# Patient Record
Sex: MALE | Race: ASIAN | NOT HISPANIC OR LATINO | Employment: FULL TIME | URBAN - METROPOLITAN AREA
[De-identification: names, ages, dates, MRNs, and addresses within clinical notes are randomized per-mention and may not be internally consistent; named-entity substitution may affect disease eponyms.]

---

## 2017-10-14 ENCOUNTER — HOSPITAL ENCOUNTER (OUTPATIENT)
Dept: LAB | Facility: MEDICAL CENTER | Age: 22
End: 2017-10-14
Attending: FAMILY MEDICINE
Payer: COMMERCIAL

## 2017-10-14 LAB
BASOPHILS # BLD AUTO: 0.7 % (ref 0–1.8)
BASOPHILS # BLD: 0.04 K/UL (ref 0–0.12)
EOSINOPHIL # BLD AUTO: 0.12 K/UL (ref 0–0.51)
EOSINOPHIL NFR BLD: 2.1 % (ref 0–6.9)
ERYTHROCYTE [DISTWIDTH] IN BLOOD BY AUTOMATED COUNT: 38.2 FL (ref 35.9–50)
HCT VFR BLD AUTO: 49 % (ref 42–52)
HGB BLD-MCNC: 16.9 G/DL (ref 14–18)
IMM GRANULOCYTES # BLD AUTO: 0.03 K/UL (ref 0–0.11)
IMM GRANULOCYTES NFR BLD AUTO: 0.5 % (ref 0–0.9)
LYMPHOCYTES # BLD AUTO: 1.43 K/UL (ref 1–4.8)
LYMPHOCYTES NFR BLD: 25.3 % (ref 22–41)
MCH RBC QN AUTO: 29.8 PG (ref 27–33)
MCHC RBC AUTO-ENTMCNC: 34.5 G/DL (ref 33.7–35.3)
MCV RBC AUTO: 86.3 FL (ref 81.4–97.8)
MONOCYTES # BLD AUTO: 0.41 K/UL (ref 0–0.85)
MONOCYTES NFR BLD AUTO: 7.3 % (ref 0–13.4)
NEUTROPHILS # BLD AUTO: 3.62 K/UL (ref 1.82–7.42)
NEUTROPHILS NFR BLD: 64.1 % (ref 44–72)
NRBC # BLD AUTO: 0 K/UL
NRBC BLD AUTO-RTO: 0 /100 WBC
PLATELET # BLD AUTO: 232 K/UL (ref 164–446)
PMV BLD AUTO: 10 FL (ref 9–12.9)
RBC # BLD AUTO: 5.68 M/UL (ref 4.7–6.1)
WBC # BLD AUTO: 5.7 K/UL (ref 4.8–10.8)

## 2017-10-14 PROCEDURE — 85025 COMPLETE CBC W/AUTO DIFF WBC: CPT

## 2017-10-14 PROCEDURE — 36415 COLL VENOUS BLD VENIPUNCTURE: CPT

## 2017-12-17 ENCOUNTER — HOSPITAL ENCOUNTER (EMERGENCY)
Facility: MEDICAL CENTER | Age: 22
End: 2017-12-17
Attending: EMERGENCY MEDICINE
Payer: COMMERCIAL

## 2017-12-17 ENCOUNTER — APPOINTMENT (OUTPATIENT)
Dept: RADIOLOGY | Facility: MEDICAL CENTER | Age: 22
End: 2017-12-17
Attending: EMERGENCY MEDICINE
Payer: COMMERCIAL

## 2017-12-17 VITALS
RESPIRATION RATE: 16 BRPM | TEMPERATURE: 98.1 F | BODY MASS INDEX: 27.4 KG/M2 | HEIGHT: 64 IN | HEART RATE: 88 BPM | DIASTOLIC BLOOD PRESSURE: 56 MMHG | WEIGHT: 160.5 LBS | SYSTOLIC BLOOD PRESSURE: 133 MMHG | OXYGEN SATURATION: 100 %

## 2017-12-17 DIAGNOSIS — S61.412A LACERATION OF LEFT HAND WITHOUT FOREIGN BODY, INITIAL ENCOUNTER: ICD-10-CM

## 2017-12-17 PROCEDURE — 90471 IMMUNIZATION ADMIN: CPT

## 2017-12-17 PROCEDURE — 303485 HCHG DRESSING MEDIUM

## 2017-12-17 PROCEDURE — 73130 X-RAY EXAM OF HAND: CPT | Mod: LT

## 2017-12-17 PROCEDURE — 700111 HCHG RX REV CODE 636 W/ 250 OVERRIDE (IP): Performed by: EMERGENCY MEDICINE

## 2017-12-17 PROCEDURE — 99284 EMERGENCY DEPT VISIT MOD MDM: CPT

## 2017-12-17 PROCEDURE — 90715 TDAP VACCINE 7 YRS/> IM: CPT | Performed by: EMERGENCY MEDICINE

## 2017-12-17 RX ORDER — CEPHALEXIN 500 MG/1
500 CAPSULE ORAL 4 TIMES DAILY
Qty: 28 CAP | Refills: 0 | Status: SHIPPED | OUTPATIENT
Start: 2017-12-17 | End: 2017-12-24

## 2017-12-17 RX ADMIN — CLOSTRIDIUM TETANI TOXOID ANTIGEN (FORMALDEHYDE INACTIVATED), CORYNEBACTERIUM DIPHTHERIAE TOXOID ANTIGEN (FORMALDEHYDE INACTIVATED), BORDETELLA PERTUSSIS TOXOID ANTIGEN (GLUTARALDEHYDE INACTIVATED), BORDETELLA PERTUSSIS FILAMENTOUS HEMAGGLUTININ ANTIGEN (FORMALDEHYDE INACTIVATED), BORDETELLA PERTUSSIS PERTACTIN ANTIGEN, AND BORDETELLA PERTUSSIS FIMBRIAE 2/3 ANTIGEN 0.5 ML: 5; 2; 2.5; 5; 3; 5 INJECTION, SUSPENSION INTRAMUSCULAR at 08:11

## 2017-12-17 ASSESSMENT — PAIN SCALES - GENERAL
PAINLEVEL_OUTOF10: 5
PAINLEVEL_OUTOF10: 5
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
## 2017-12-17 NOTE — DISCHARGE INSTRUCTIONS
Laceration Care, Adult  A laceration is a cut that goes through all layers of the skin. The cut also goes into the tissue that is right under the skin. Some cuts heal on their own. Others need to be closed with stitches (sutures), staples, skin adhesive strips, or wound glue. Taking care of your cut lowers your risk of infection and helps your cut to heal better.  HOW TO TAKE CARE OF YOUR CUT  For stitches or staples:  · Keep the wound clean and dry.  · If you were given a bandage (dressing), you should change it at least one time per day or as told by your doctor. You should also change it if it gets wet or dirty.  · Keep the wound completely dry for the first 24 hours or as told by your doctor. After that time, you may take a shower or a bath. However, make sure that the wound is not soaked in water until after the stitches or staples have been removed.  · Clean the wound one time each day or as told by your doctor:  ¨ Wash the wound with soap and water.  ¨ Rinse the wound with water until all of the soap comes off.  ¨ Pat the wound dry with a clean towel. Do not rub the wound.  · After you clean the wound, put a thin layer of antibiotic ointment on it as told by your doctor. This ointment:  ¨ Helps to prevent infection.  ¨ Keeps the bandage from sticking to the wound.  · Have your stitches or staples removed as told by your doctor.  If your doctor used skin adhesive strips:   · Keep the wound clean and dry.  · If you were given a bandage, you should change it at least one time per day or as told by your doctor. You should also change it if it gets dirty or wet.  · Do not get the skin adhesive strips wet. You can take a shower or a bath, but be careful to keep the wound dry.  · If the wound gets wet, pat it dry with a clean towel. Do not rub the wound.  · Skin adhesive strips fall off on their own. You can trim the strips as the wound heals. Do not remove any strips that are still stuck to the wound. They will  fall off after a while.  If your doctor used wound glue:  · Try to keep your wound dry, but you may briefly wet it in the shower or bath. Do not soak the wound in water, such as by swimming.  · After you take a shower or a bath, gently pat the wound dry with a clean towel. Do not rub the wound.  · Do not do any activities that will make you really sweaty until the skin glue has fallen off on its own.  · Do not apply liquid, cream, or ointment medicine to your wound while the skin glue is still on.  · If you were given a bandage, you should change it at least one time per day or as told by your doctor. You should also change it if it gets dirty or wet.  · If a bandage is placed over the wound, do not let the tape for the bandage touch the skin glue.  · Do not pick at the glue. The skin glue usually stays on for 5-10 days. Then, it falls off of the skin.  General Instructions   · To help prevent scarring, make sure to cover your wound with sunscreen whenever you are outside after stitches are removed, after adhesive strips are removed, or when wound glue stays in place and the wound is healed. Make sure to wear a sunscreen of at least 30 SPF.  · Take over-the-counter and prescription medicines only as told by your doctor.  · If you were given antibiotic medicine or ointment, take or apply it as told by your doctor. Do not stop using the antibiotic even if your wound is getting better.  · Do not scratch or pick at the wound.  · Keep all follow-up visits as told by your doctor. This is important.  · Check your wound every day for signs of infection. Watch for:  ¨ Redness, swelling, or pain.  ¨ Fluid, blood, or pus.  · Raise (elevate) the injured area above the level of your heart while you are sitting or lying down, if possible.  GET HELP IF:  · You got a tetanus shot and you have any of these problems at the injection site:  ¨ Swelling.  ¨ Very bad pain.  ¨ Redness.  ¨ Bleeding.  · You have a fever.  · A wound that was  closed breaks open.  · You notice a bad smell coming from your wound or your bandage.  · You notice something coming out of the wound, such as wood or glass.  · Medicine does not help your pain.  · You have more redness, swelling, or pain at the site of your wound.  · You have fluid, blood, or pus coming from your wound.  · You notice a change in the color of your skin near your wound.  · You need to change the bandage often because fluid, blood, or pus is coming from the wound.  · You start to have a new rash.  · You start to have numbness around the wound.  GET HELP RIGHT AWAY IF:  · You have very bad swelling around the wound.  · Your pain suddenly gets worse and is very bad.  · You notice painful lumps near the wound or on skin that is anywhere on your body.  · You have a red streak going away from your wound.  · The wound is on your hand or foot and you cannot move a finger or toe like you usually can.  · The wound is on your hand or foot and you notice that your fingers or toes look pale or bluish.     This information is not intended to replace advice given to you by your health care provider. Make sure you discuss any questions you have with your health care provider.     Document Released: 06/05/2009 Document Revised: 05/03/2016 Document Reviewed: 12/14/2015  [employer] Interactive Patient Education ©2016 [employer]    Nonsutured Laceration Care  A laceration is a cut that goes through all layers of the skin and extends into the tissue that is right under the skin. This type of cut is usually stitched up (sutured) or closed with tape (adhesive strips) or skin glue shortly after the injury happens.  However, if the wound is dirty or if several hours pass before medical treatment is provided, it is likely that germs (bacteria) will enter the wound. Closing a laceration after bacteria have entered it increases the risk of infection. In these cases, your health care provider may leave the laceration open  (nonsutured) and cover it with a bandage. This type of treatment helps prevent infection and allows the wound to heal from the deepest layer of tissue damage up to the surface.  An open fracture is a type of injury that may involve nonsutured lacerations. An open fracture is a break in a bone that happens along with one or more lacerations through the skin that is near the fracture site.  HOW TO CARE FOR YOUR NONSUTURED LACERATION  · Take or apply over-the-counter and prescription medicines only as told by your health care provider.  · If you were prescribed an antibiotic medicine, take or apply it as told by your health care provider. Do not stop using the antibiotic even if your condition improves.  · Clean the wound one time each day or as told by your health care provider.  ¨ Wash the wound with mild soap and water.  ¨ Rinse the wound with water to remove all soap.  ¨ Pat your wound dry with a clean towel. Do not rub the wound.  · Do not inject anything into the wound unless your health care provider told you to.  · Change any bandages (dressings) as told by your health care provider. This includes changing the dressing if it gets wet, dirty, or starts to smell bad.  · Keep the dressing dry until your health care provider says it can be removed. Do not take baths, swim, or do anything that puts your wound underwater until your health care provider approves.  · Raise (elevate) the injured area above the level of your heart while you are sitting or lying down, if possible.  · Do not scratch or pick at the wound.  · Check your wound every day for signs of infection. Watch for:  ¨ Redness, swelling, or pain.  ¨ Fluid, blood, or pus.  · Keep all follow-up visits as told by your health care provider. This is important.  SEEK MEDICAL CARE IF:  · You received a tetanus and shot and you have swelling, severe pain, redness, or bleeding at the injection site.    · You have a fever.  · Your pain is not controlled with  medicine.  · You have increased redness, swelling, or pain at the site of your wound.  · You have fluid, blood, or pus coming from your wound.  · You notice a bad smell coming from your wound or your dressing.  · You notice something coming out of the wound, such as wood or glass.  · You notice a change in the color of your skin near your wound.  · You develop a new rash.  · You need to change the dressing frequently due to fluid, blood, or pus draining from the wound.  · You develop numbness around your wound.  SEEK IMMEDIATE MEDICAL CARE IF:  · Your pain suddenly increases and is severe.  · You develop severe swelling around the wound.  · The wound is on your hand or foot and you cannot properly move a finger or toe.  · The wound is on your hand or foot and you notice that your fingers or toes look pale or bluish.  · You have a red streak going away from your wound.     This information is not intended to replace advice given to you by your health care provider. Make sure you discuss any questions you have with your health care provider.     Document Released: 11/15/2007 Document Revised: 05/03/2016 Document Reviewed: 12/14/2015  [employer] Interactive Patient Education ©2016 [employer]

## 2017-12-17 NOTE — ED NOTES
Hand dressing complete.  Discussed  wound care.  RX x 1 in hand.  Questions answered. Released amb to self, out w/ parents.

## 2017-12-30 ENCOUNTER — OFFICE VISIT (OUTPATIENT)
Dept: URGENT CARE | Facility: PHYSICIAN GROUP | Age: 22
End: 2017-12-30
Payer: COMMERCIAL

## 2017-12-30 VITALS
DIASTOLIC BLOOD PRESSURE: 70 MMHG | TEMPERATURE: 98.4 F | HEIGHT: 64 IN | HEART RATE: 91 BPM | SYSTOLIC BLOOD PRESSURE: 120 MMHG | BODY MASS INDEX: 26.63 KG/M2 | RESPIRATION RATE: 14 BRPM | OXYGEN SATURATION: 95 % | WEIGHT: 156 LBS

## 2017-12-30 DIAGNOSIS — J22 LOWER RESPIRATORY INFECTION: ICD-10-CM

## 2017-12-30 PROCEDURE — 99203 OFFICE O/P NEW LOW 30 MIN: CPT | Performed by: PHYSICIAN ASSISTANT

## 2017-12-30 RX ORDER — BENZONATATE 100 MG/1
100 CAPSULE ORAL 3 TIMES DAILY PRN
Qty: 60 CAP | Refills: 0 | Status: SHIPPED | OUTPATIENT
Start: 2017-12-30 | End: 2019-03-11

## 2017-12-30 RX ORDER — AZITHROMYCIN 250 MG/1
TABLET, FILM COATED ORAL
Qty: 6 TAB | Refills: 0 | Status: SHIPPED | OUTPATIENT
Start: 2017-12-30 | End: 2019-03-11

## 2017-12-30 ASSESSMENT — ENCOUNTER SYMPTOMS
SPUTUM PRODUCTION: 1
ABDOMINAL PAIN: 0
VOMITING: 0
DIZZINESS: 0
FEVER: 0
CHILLS: 0
SHORTNESS OF BREATH: 0
MYALGIAS: 0
NAUSEA: 0
SORE THROAT: 0
COUGH: 1
DIARRHEA: 0

## 2017-12-30 ASSESSMENT — COPD QUESTIONNAIRES: COPD: 0

## 2017-12-30 NOTE — PROGRESS NOTES
"Subjective:      Gildardo Moody is a 22 y.o. male who presents with Cough (deep in chest sinus x3 weeks )            Cough   This is a new problem. The current episode started 1 to 4 weeks ago (3 weeks). The problem has been unchanged. The problem occurs every few minutes. The cough is non-productive. Pertinent negatives include no chest pain, chills, ear pain, fever, myalgias, rash, sore throat or shortness of breath. The symptoms are aggravated by lying down. He has tried OTC cough suppressant for the symptoms. The treatment provided mild relief. There is no history of asthma, COPD or pneumonia.     Patient reports he was recently given a prescription for Keflex 2 weeks ago after a laceration to his palm. His symptoms got better while he was taking the antibiotics, but then worsened again after he stopped taking them again. His sister was recently sick with similar symptoms.     Review of Systems   Constitutional: Negative for chills and fever.   HENT: Negative for congestion, ear pain and sore throat.    Respiratory: Positive for cough and sputum production. Negative for shortness of breath.    Cardiovascular: Negative for chest pain.   Gastrointestinal: Negative for abdominal pain, diarrhea, nausea and vomiting.   Genitourinary: Negative.    Musculoskeletal: Negative for myalgias.   Skin: Negative for rash.   Neurological: Negative for dizziness.        Objective:     /70   Pulse 91   Temp 36.9 °C (98.4 °F)   Resp 14   Ht 1.626 m (5' 4\")   Wt 70.8 kg (156 lb)   SpO2 95%   BMI 26.78 kg/m²      Physical Exam   Constitutional: He is oriented to person, place, and time. He appears well-developed and well-nourished. No distress.   HENT:   Head: Normocephalic and atraumatic.   Right Ear: Hearing, tympanic membrane, external ear and ear canal normal.   Left Ear: Hearing, tympanic membrane, external ear and ear canal normal.   Mouth/Throat: No oropharyngeal exudate, posterior oropharyngeal edema " or posterior oropharyngeal erythema.   Eyes: Conjunctivae are normal. Pupils are equal, round, and reactive to light.   Neck: Normal range of motion.   Cardiovascular: Normal rate, regular rhythm and normal heart sounds.    No murmur heard.  Pulmonary/Chest: Effort normal. He has wheezes. He has rales.   Musculoskeletal: Normal range of motion.   Lymphadenopathy:     He has no cervical adenopathy.   Neurological: He is alert and oriented to person, place, and time.   Skin: Skin is warm and dry. He is not diaphoretic.   Psychiatric: He has a normal mood and affect. His behavior is normal.   Nursing note and vitals reviewed.         PMH:  has a past medical history of ADHD (attention deficit hyperactivity disorder) and ASTHMA.  MEDS:   Current Outpatient Prescriptions:   •  azithromycin (ZITHROMAX) 250 MG Tab, Take 2 tablets by mouth on day one, then 1 tablet by mouth on days two through five, Disp: 6 Tab, Rfl: 0  •  benzonatate (TESSALON) 100 MG Cap, Take 1 Cap by mouth 3 times a day as needed for Cough., Disp: 60 Cap, Rfl: 0  •  methylphenidate (CONCERTA) 54 MG CR tablet, Take 54 mg by mouth every morning., Disp: , Rfl:   •  Multiple Vitamins-Minerals (MULTIVITAMIN PO), Take  by mouth every day., Disp: , Rfl:   ALLERGIES: No Known Allergies  SURGHX:   Past Surgical History:   Procedure Laterality Date   • SHOULDER ARTHROSCOPY W/ SLAP / LABRAL REPAIR  3/13/2014    Performed by Fabián Rosenberg M.D. at [facility]   • OTHER  1/2014    left shoulder dislocation put back in place   • OTHER  1994    detached retina OU     SOCHX:  reports that he has never smoked. He has never used smokeless tobacco. He reports that he drinks alcohol.  FH: family history is not on file.       Assessment/Plan:     1. Lower respiratory infection    - azithromycin (ZITHROMAX) 250 MG Tab; Take 2 tablets by mouth on day one, then 1 tablet by mouth on days two through five  Dispense: 6 Tab; Refill: 0   - Complete full course of  antibiotics as prescribed   - benzonatate (TESSALON) 100 MG Cap; Take 1 Cap by mouth 3 times a day as needed for Cough.  Dispense: 60 Cap; Refill: 0    Call or return to office if symptoms persist or worsen, would recommend CXR at that time. The patient demonstrated a good understanding and agreed with the treatment plan.

## 2017-12-30 NOTE — LETTER
December 30, 2017         Patient: Glidardo Moody   YOB: 1995   Date of Visit: 12/30/2017           To Whom it May Concern:    Gildardo Moody was seen in my clinic on 12/30/2017. Please excuse his absence today, 12/30/17.    If you have any questions or concerns, please don't hesitate to call.        Sincerely,           Alana Gilmore P.A.-C.  Electronically Signed

## 2018-10-05 ENCOUNTER — HOSPITAL ENCOUNTER (OUTPATIENT)
Facility: MEDICAL CENTER | Age: 23
End: 2018-10-05
Attending: FAMILY MEDICINE
Payer: COMMERCIAL

## 2018-10-05 ENCOUNTER — OFFICE VISIT (OUTPATIENT)
Dept: URGENT CARE | Facility: PHYSICIAN GROUP | Age: 23
End: 2018-10-05
Payer: COMMERCIAL

## 2018-10-05 VITALS
DIASTOLIC BLOOD PRESSURE: 76 MMHG | SYSTOLIC BLOOD PRESSURE: 112 MMHG | HEART RATE: 97 BPM | OXYGEN SATURATION: 98 % | BODY MASS INDEX: 27.31 KG/M2 | TEMPERATURE: 99.2 F | HEIGHT: 64 IN | WEIGHT: 160 LBS | RESPIRATION RATE: 14 BRPM

## 2018-10-05 DIAGNOSIS — R82.90 ABNORMAL URINALYSIS: ICD-10-CM

## 2018-10-05 DIAGNOSIS — A74.9 CHLAMYDIA: ICD-10-CM

## 2018-10-05 DIAGNOSIS — R30.0 DYSURIA: ICD-10-CM

## 2018-10-05 LAB
APPEARANCE UR: CLEAR
BILIRUB UR STRIP-MCNC: NEGATIVE MG/DL
COLOR UR AUTO: YELLOW
GLUCOSE UR STRIP.AUTO-MCNC: NEGATIVE MG/DL
KETONES UR STRIP.AUTO-MCNC: NEGATIVE MG/DL
LEUKOCYTE ESTERASE UR QL STRIP.AUTO: NORMAL
NITRITE UR QL STRIP.AUTO: NEGATIVE
PH UR STRIP.AUTO: 7 [PH] (ref 5–8)
PROT UR QL STRIP: NEGATIVE MG/DL
RBC UR QL AUTO: NORMAL
SP GR UR STRIP.AUTO: 1.02
UROBILINOGEN UR STRIP-MCNC: 0.2 MG/DL

## 2018-10-05 PROCEDURE — 87491 CHLMYD TRACH DNA AMP PROBE: CPT

## 2018-10-05 PROCEDURE — 81002 URINALYSIS NONAUTO W/O SCOPE: CPT | Performed by: FAMILY MEDICINE

## 2018-10-05 PROCEDURE — 87086 URINE CULTURE/COLONY COUNT: CPT

## 2018-10-05 PROCEDURE — 99214 OFFICE O/P EST MOD 30 MIN: CPT | Performed by: FAMILY MEDICINE

## 2018-10-05 PROCEDURE — 87591 N.GONORRHOEAE DNA AMP PROB: CPT

## 2018-10-05 NOTE — PROGRESS NOTES
"Subjective:      Gildardo Moody is a 23 y.o. male who presents with Dysuria (x 4 days/ painful when urinating )            4 days dysuria. No penile discharge. No rash.  No hematuria.  No past medical history of urinary tract infection.  He has had unprotected sex and is concerned about STI.  No fever.  No OTC or home remedies tried.  Symptoms are moderate severity.  No aggravating or alleviating factors.        Review of Systems   Constitutional: Negative for malaise/fatigue and weight loss.   HENT: Negative for sore throat.    Eyes: Negative for discharge and redness.   Musculoskeletal: Negative for joint pain and myalgias.   Skin: Negative for itching.   Neurological: Negative for sensory change and focal weakness.     .  Medications, Allergies, and current problem list reviewed today in Epic       Objective:     /76 (BP Location: Left arm, Patient Position: Sitting)   Pulse 97   Temp 37.3 °C (99.2 °F) (Temporal)   Resp 14   Ht 1.626 m (5' 4\")   Wt 72.6 kg (160 lb)   SpO2 98%   BMI 27.46 kg/m²      Physical Exam   Constitutional: He is oriented to person, place, and time. He appears well-developed and well-nourished. No distress.   HENT:   Head: Normocephalic and atraumatic.   Mouth/Throat: Oropharynx is clear and moist.   Eyes: Conjunctivae are normal.   Cardiovascular: Normal rate, regular rhythm and normal heart sounds.    Pulmonary/Chest: Effort normal and breath sounds normal.   Genitourinary:   Genitourinary Comments: Deferred per patient request.  He notes no external lesions or rash.   Neurological: He is alert and oriented to person, place, and time.   Skin: Skin is warm and dry. No rash noted.               Assessment/Plan:   UA reviewed  Urine culture negative  Chlamydia positive    1. Dysuria  CHLAMYDIA/GC PCR URINE OR SWAB    POCT Urinalysis   2. Abnormal urinalysis  URINE CULTURE(NEW)   3. Chlamydia  doxycycline (VIBRAMYCIN) 100 MG Tab     Differential diagnosis, natural " history, supportive care, and indications for immediate follow-up discussed at length.

## 2018-10-06 DIAGNOSIS — R82.90 ABNORMAL URINALYSIS: ICD-10-CM

## 2018-10-07 LAB
C TRACH DNA SPEC QL NAA+PROBE: POSITIVE
N GONORRHOEA DNA SPEC QL NAA+PROBE: NEGATIVE
SPECIMEN SOURCE: ABNORMAL

## 2018-10-08 ENCOUNTER — TELEPHONE (OUTPATIENT)
Dept: URGENT CARE | Facility: PHYSICIAN GROUP | Age: 23
End: 2018-10-08

## 2018-10-08 LAB
BACTERIA UR CULT: NORMAL
SIGNIFICANT IND 70042: NORMAL
SITE SITE: NORMAL
SOURCE SOURCE: NORMAL

## 2018-10-08 RX ORDER — DOXYCYCLINE HYCLATE 100 MG
100 TABLET ORAL 2 TIMES DAILY
Qty: 20 TAB | Refills: 0 | Status: SHIPPED | OUTPATIENT
Start: 2018-10-08 | End: 2018-10-15

## 2018-10-09 ASSESSMENT — ENCOUNTER SYMPTOMS
SENSORY CHANGE: 0
SORE THROAT: 0
WEIGHT LOSS: 0
EYE DISCHARGE: 0
MYALGIAS: 0
FOCAL WEAKNESS: 0
EYE REDNESS: 0

## 2018-10-23 ENCOUNTER — HOSPITAL ENCOUNTER (OUTPATIENT)
Dept: LAB | Facility: MEDICAL CENTER | Age: 23
End: 2018-10-23
Attending: FAMILY MEDICINE
Payer: COMMERCIAL

## 2018-10-23 LAB
BASOPHILS # BLD AUTO: 0.6 % (ref 0–1.8)
BASOPHILS # BLD: 0.03 K/UL (ref 0–0.12)
EOSINOPHIL # BLD AUTO: 0.09 K/UL (ref 0–0.51)
EOSINOPHIL NFR BLD: 1.9 % (ref 0–6.9)
ERYTHROCYTE [DISTWIDTH] IN BLOOD BY AUTOMATED COUNT: 41.9 FL (ref 35.9–50)
HCT VFR BLD AUTO: 48.6 % (ref 42–52)
HGB BLD-MCNC: 16.7 G/DL (ref 14–18)
IMM GRANULOCYTES # BLD AUTO: 0.03 K/UL (ref 0–0.11)
IMM GRANULOCYTES NFR BLD AUTO: 0.6 % (ref 0–0.9)
LYMPHOCYTES # BLD AUTO: 1.17 K/UL (ref 1–4.8)
LYMPHOCYTES NFR BLD: 24.1 % (ref 22–41)
MCH RBC QN AUTO: 30.4 PG (ref 27–33)
MCHC RBC AUTO-ENTMCNC: 34.4 G/DL (ref 33.7–35.3)
MCV RBC AUTO: 88.5 FL (ref 81.4–97.8)
MONOCYTES # BLD AUTO: 0.35 K/UL (ref 0–0.85)
MONOCYTES NFR BLD AUTO: 7.2 % (ref 0–13.4)
NEUTROPHILS # BLD AUTO: 3.18 K/UL (ref 1.82–7.42)
NEUTROPHILS NFR BLD: 65.6 % (ref 44–72)
NRBC # BLD AUTO: 0 K/UL
NRBC BLD-RTO: 0 /100 WBC
PLATELET # BLD AUTO: 257 K/UL (ref 164–446)
PMV BLD AUTO: 10 FL (ref 9–12.9)
RBC # BLD AUTO: 5.49 M/UL (ref 4.7–6.1)
WBC # BLD AUTO: 4.9 K/UL (ref 4.8–10.8)

## 2018-10-23 PROCEDURE — 36415 COLL VENOUS BLD VENIPUNCTURE: CPT

## 2018-10-23 PROCEDURE — 85025 COMPLETE CBC W/AUTO DIFF WBC: CPT

## 2018-11-01 ENCOUNTER — HOSPITAL ENCOUNTER (OUTPATIENT)
Dept: LAB | Facility: MEDICAL CENTER | Age: 23
End: 2018-11-01
Attending: FAMILY MEDICINE
Payer: COMMERCIAL

## 2018-11-01 DIAGNOSIS — A74.9 CHLAMYDIA: ICD-10-CM

## 2018-11-01 PROCEDURE — 87491 CHLMYD TRACH DNA AMP PROBE: CPT

## 2018-11-01 PROCEDURE — 87591 N.GONORRHOEAE DNA AMP PROB: CPT

## 2018-11-05 ENCOUNTER — TELEPHONE (OUTPATIENT)
Dept: URGENT CARE | Facility: PHYSICIAN GROUP | Age: 23
End: 2018-11-05

## 2018-11-05 RX ORDER — AZITHROMYCIN 250 MG/1
1000 TABLET, FILM COATED ORAL ONCE
Qty: 4 TAB | Refills: 0 | Status: SHIPPED | OUTPATIENT
Start: 2018-11-05 | End: 2018-11-05

## 2018-11-07 ENCOUNTER — OFFICE VISIT (OUTPATIENT)
Dept: BEHAVIORAL HEALTH | Facility: CLINIC | Age: 23
End: 2018-11-07
Payer: COMMERCIAL

## 2018-11-07 DIAGNOSIS — F33.0 MAJOR DEPRESSIVE DISORDER, RECURRENT EPISODE, MILD (HCC): ICD-10-CM

## 2018-11-07 PROCEDURE — 90791 PSYCH DIAGNOSTIC EVALUATION: CPT | Performed by: MARRIAGE & FAMILY THERAPIST
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
## 2018-11-19 ENCOUNTER — HOSPITAL ENCOUNTER (OUTPATIENT)
Dept: LAB | Facility: MEDICAL CENTER | Age: 23
End: 2018-11-19
Attending: FAMILY MEDICINE
Payer: COMMERCIAL

## 2018-11-19 DIAGNOSIS — A74.9 CHLAMYDIA: ICD-10-CM

## 2018-11-19 PROCEDURE — 87491 CHLMYD TRACH DNA AMP PROBE: CPT

## 2018-11-19 PROCEDURE — 87591 N.GONORRHOEAE DNA AMP PROB: CPT

## 2018-11-20 LAB
C TRACH DNA SPEC QL NAA+PROBE: NEGATIVE
N GONORRHOEA DNA SPEC QL NAA+PROBE: NEGATIVE
SPECIMEN SOURCE: NORMAL

## 2018-11-26 ENCOUNTER — OFFICE VISIT (OUTPATIENT)
Dept: URGENT CARE | Facility: PHYSICIAN GROUP | Age: 23
End: 2018-11-26
Payer: COMMERCIAL

## 2018-11-26 VITALS
SYSTOLIC BLOOD PRESSURE: 126 MMHG | OXYGEN SATURATION: 97 % | HEIGHT: 64 IN | RESPIRATION RATE: 16 BRPM | BODY MASS INDEX: 26.63 KG/M2 | DIASTOLIC BLOOD PRESSURE: 90 MMHG | TEMPERATURE: 98.8 F | WEIGHT: 156 LBS | HEART RATE: 76 BPM

## 2018-11-26 DIAGNOSIS — J01.01 ACUTE RECURRENT MAXILLARY SINUSITIS: ICD-10-CM

## 2018-11-26 PROCEDURE — 99214 OFFICE O/P EST MOD 30 MIN: CPT | Performed by: FAMILY MEDICINE

## 2018-11-26 RX ORDER — AMOXICILLIN AND CLAVULANATE POTASSIUM 875; 125 MG/1; MG/1
1 TABLET, FILM COATED ORAL 2 TIMES DAILY
Qty: 20 TAB | Refills: 0 | Status: SHIPPED | OUTPATIENT
Start: 2018-11-26 | End: 2018-12-06

## 2018-11-27 ASSESSMENT — ENCOUNTER SYMPTOMS
MYALGIAS: 0
EYE DISCHARGE: 0
SENSORY CHANGE: 0
FOCAL WEAKNESS: 0
EYE REDNESS: 0
WEIGHT LOSS: 0

## 2018-11-28 NOTE — PROGRESS NOTES
"Subjective:      Gildardo Moody is a 23 y.o. male who presents with Sinusitis (drainage, swelling behind ears , sore throat onset today)            1 day maxillary sinus pressure and drainage. Subjective fever/chills. PMH sinusitis/no sinus surgery. Associated ST and mild cough. Minimal relief with OTC decongestant. No SOB/wheeze. Sx's are moderate severity. No other aggravating or alleviating factors.          Review of Systems   Constitutional: Negative for malaise/fatigue and weight loss.   HENT: Negative for ear discharge and ear pain.    Eyes: Negative for discharge and redness.   Musculoskeletal: Negative for myalgias.   Neurological: Negative for sensory change and focal weakness.     .  Medications, Allergies, and current problem list reviewed today in Epic       Objective:     /90   Pulse 76   Temp 37.1 °C (98.8 °F) (Temporal)   Resp 16   Ht 1.626 m (5' 4\")   Wt 70.8 kg (156 lb)   SpO2 97%   BMI 26.78 kg/m²      Physical Exam   Constitutional: He is oriented to person, place, and time. He appears well-developed and well-nourished. No distress.   HENT:   Head: Normocephalic.   Right Ear: External ear normal.   Left Ear: External ear normal.   Tender maxillary sinus bilateral   Eyes: Conjunctivae are normal.   Neck: Neck supple.   Cardiovascular: Normal rate, regular rhythm and normal heart sounds.    Pulmonary/Chest: Effort normal and breath sounds normal.   Musculoskeletal: He exhibits no edema or tenderness.   Lymphadenopathy:     He has no cervical adenopathy.   Neurological: He is alert and oriented to person, place, and time.   Skin: Skin is warm and dry. No rash noted.               Assessment/Plan:     1. Acute recurrent maxillary sinusitis    - amoxicillin-clavulanate (AUGMENTIN) 875-125 MG Tab; Take 1 Tab by mouth 2 times a day for 10 days.  Dispense: 20 Tab; Refill: 0    Differential diagnosis, natural history, supportive care, and indications for immediate follow-up " discussed at length.     Contingent antibiotic prescription given to patient to fill upon meeting criteria of guidelines discussed.     Nasal saline, decongestant, nasal CS

## 2018-11-29 ENCOUNTER — APPOINTMENT (OUTPATIENT)
Dept: BEHAVIORAL HEALTH | Facility: CLINIC | Age: 23
End: 2018-11-29
Payer: COMMERCIAL

## 2018-12-05 ENCOUNTER — OFFICE VISIT (OUTPATIENT)
Dept: BEHAVIORAL HEALTH | Facility: CLINIC | Age: 23
End: 2018-12-05
Payer: COMMERCIAL

## 2018-12-05 DIAGNOSIS — F33.0 MAJOR DEPRESSIVE DISORDER, RECURRENT EPISODE, MILD (HCC): ICD-10-CM

## 2018-12-05 PROCEDURE — 90834 PSYTX W PT 45 MINUTES: CPT | Performed by: MARRIAGE & FAMILY THERAPIST

## 2018-12-05 NOTE — BH THERAPY
" [facility]  Therapy Progress Note    Patient Name: Gildardo Moody  Patient MRN: [id number]  Today's Date: 12/5/2018     Type of session:Individual psychotherapy  Length of session: 45 minutes  Persons in attendance:Patient    Subjective/New Info: pt reports that he no longer is too bothered by the situation w/ ex and friends, having decided to forgive friends and let it go; starts a new job tonight, working nights; talked about concerns about the lack of communication in family; he has not had a conversation w/ sister Kasandra for 10 yrs (mom thinks she may be on the spectrum); parents \"are very [place]\" and don't show/talk about emotions, especially dad and all communication goes through mom; he would like to see this change but nobody else seems to want to    Objective/Observations:   Participation: Active verbal participation   Grooming: Casual   Cognition: Alert   Eye contact: Good   Mood: Anxious   Affect: Anxious   Thought process: Logical   Speech: Rate within normal limits   Other:     Diagnoses:   1. Major depressive disorder, recurrent episode, mild (HCC)         Current risk:   SUICIDE: Low   Homicide: Not applicable   Self-harm: Not applicable   Relapse: Not applicable   Other:    Safety Plan reviewed? Not Indicated   If evidence of imminent risk is present, intervention/plan:     Therapeutic Intervention(s): Communication skills, Interpersonal effectiveness skills, Parenting/familial roles addressed and Self-care skills    Treatment Goal(s)/Objective(s) addressed: sees that his need for relns (not so great now) comes from lack of communication in family; paying attn to own needs     Progress toward Treatment Goals: Mild improvement    Plan: will keep next appt and go from there  - Next appointment scheduled:  12/21/2018    EVELYNE Mobley  12/5/2018                                   "

## 2019-03-11 ENCOUNTER — OFFICE VISIT (OUTPATIENT)
Dept: INTERNAL MEDICINE | Facility: MEDICAL CENTER | Age: 24
End: 2019-03-11
Payer: COMMERCIAL

## 2019-03-11 VITALS
OXYGEN SATURATION: 95 % | RESPIRATION RATE: 20 BRPM | DIASTOLIC BLOOD PRESSURE: 86 MMHG | WEIGHT: 147 LBS | SYSTOLIC BLOOD PRESSURE: 132 MMHG | BODY MASS INDEX: 26.05 KG/M2 | HEART RATE: 76 BPM | TEMPERATURE: 98.9 F | HEIGHT: 63 IN

## 2019-03-11 DIAGNOSIS — Z00.00 HEALTH CARE MAINTENANCE: ICD-10-CM

## 2019-03-11 DIAGNOSIS — F32.A DEPRESSION, UNSPECIFIED DEPRESSION TYPE: ICD-10-CM

## 2019-03-11 DIAGNOSIS — F90.9 ATTENTION DEFICIT HYPERACTIVITY DISORDER (ADHD), UNSPECIFIED ADHD TYPE: ICD-10-CM

## 2019-03-11 PROCEDURE — 99204 OFFICE O/P NEW MOD 45 MIN: CPT | Mod: GC | Performed by: INTERNAL MEDICINE

## 2019-03-11 RX ORDER — MULTIVITAMIN WITH IRON
TABLET ORAL
COMMUNITY
End: 2019-06-07

## 2019-03-11 ASSESSMENT — PATIENT HEALTH QUESTIONNAIRE - PHQ9: CLINICAL INTERPRETATION OF PHQ2 SCORE: 0

## 2019-03-11 NOTE — PROGRESS NOTES
New Patient to Establish    Reason to establish: PCP moving out of practice    CC: Establish care    HPI: 23-year-old male patient with a past medical history of ADHD, depression comes in to establish care.  Patient states he was following with Dr. Ynes Rodriguez, who is going out of practice so wants to establish care in our office.  Patient has a history of ADHD diagnosed at the age of 6 years.  Since then he has been on methylphenidate 54 mg 1 tablet p.o. once daily.  States his symptoms have been stable with the current medication.  Patient also has a history of depression for the past 2 years.  Following with a therapist.  Never on medication or never followed with a psychiatrist.  Denies SI/HI.  PHQ 2-0.    Otherwise feels better and denies any symptoms today in the office.    Patient Active Problem List    Diagnosis Date Noted   • Health care maintenance 03/11/2019   • Attention deficit hyperactivity disorder (ADHD) 03/11/2019   • Major depressive disorder, recurrent episode, mild (HCC) 11/07/2018       Past Medical History:   Diagnosis Date   • ADHD (attention deficit hyperactivity disorder)    • Allergy    • ASTHMA    • Depression        Current Outpatient Prescriptions   Medication Sig Dispense Refill   • Omega-3 Fatty Acids (FISH OIL CONCENTRATE) 300 MG Cap Take  by mouth.     • Multiple Vitamins-Minerals (MULTIVITAMIN PO) Take  by mouth every day.     • methylphenidate (CONCERTA) 54 MG CR tablet Take 54 mg by mouth every morning.       No current facility-administered medications for this visit.        Allergies as of 03/11/2019   • (No Known Allergies)       Social History     Social History   • Marital status: Single     Spouse name: N/A   • Number of children: N/A   • Years of education: N/A     Occupational History   • Not on file.     Social History Main Topics   • Smoking status: Former Smoker     Quit date: 3/11/2018   • Smokeless tobacco: Never Used   • Alcohol use Yes      Comment: 1 xweek   •  "Drug use: No      Comment: one time    • Sexual activity: Yes     Partners: Female     Other Topics Concern   • Not on file     Social History Narrative   • No narrative on file       Family History   Problem Relation Age of Onset   • Family history unknown: Yes       Past Surgical History:   Procedure Laterality Date   • SHOULDER ARTHROSCOPY W/ SLAP / LABRAL REPAIR  3/13/2014    Performed by Fabián Rosenberg M.D. at [facility]   • OTHER  1/2014    left shoulder dislocation put back in place   • OTHER  1994    detached retina OU       ROS: As per HPI. Additional pertinent symptoms as noted below.    Constitutional: Denies fever/chills/weight changes.   Eyes: Denies changes/pain in vision  ENT: Denies sore throat/congestion/ear ache.   Cardiovascular: Denies chest pain /palpitations/edema.   Respiratory: Denies SOB/cough/PND/orthopnea  Abdomen: Denies difficulty swallowing/ diarrhea/constipation/abdominal pain/nausea/vomiting  Genitourinary: Normal urinary habits.   Musculo-skeletal: normal ambulation.Denies muscle or joint pains.  Skin: Denies rash/lesions.  Neurological: Denies weakness/tingling/numbness/headache  Psychological: good mood and cooperative. Denies anxiety /depression       /86 (BP Location: Left arm, Patient Position: Sitting, BP Cuff Size: Adult)   Pulse 76   Temp 37.2 °C (98.9 °F) (Temporal)   Resp 20   Ht 1.605 m (5' 3.19\")   Wt 66.7 kg (147 lb)   SpO2 95%   BMI 25.88 kg/m²     Physical Exam  General:  Alert and oriented, No apparent distress.    Eyes: Pupils equal and reactive. No scleral icterus.    Throat: Clear no erythema or exudates noted.    Neck: Supple. No lymphadenopathy noted. Thyroid not enlarged.    Lungs: Clear to auscultation and percussion bilaterally.    Cardiovascular: Regular rate and rhythm. No murmurs, rubs or gallops.    Abdomen:  Benign. No rebound or guarding noted.    Extremities: No clubbing, cyanosis, edema.    Skin: Clear. No rash or suspicious " skin lesions noted.    Neurological: Oriented to time, place, and person .Cranial nerves intact. No motor/sensory deficits.Reflexes were normal and symmetrical in both upper and lower extremities     Musculoskeletal : NROM of all extremities. No tenderness or deformity noted.     Note: I have reviewed all pertinent labs and diagnostic tests associated with this visit with specific comments listed under the assessment and plan below      Assessment and Plan    1. Attention deficit hyperactivity disorder (ADHD), unspecified ADHD type  Diagnosed at age 6 years  Denies any related symptoms  Stable on current medication-methylphenidate 54 mg 1 tablet p.o. once daily  His prior PCP was refilling the medication every 3 months  Ordered referral to psychiatry for further refills and management  Ordered CMP and TSH for next visit    2. Depression, unspecified depression type  Experiencing symptoms for the past 2 years  Following with therapist  Never on any medications or followed up with a psychiatrist in the past  PHQ 2-0  Ordered CMP and TSH for next visit    3. Health care maintenance  Influenza vaccination- did not have this season  Tdap -12/17/17  Not a candidate for colonoscopy/DEXA yet  Mammogram and pap smear not applicable    Risk Assessment (discuss potential complications a function of chronic problems): spent 35 min's discussing plans of management and educating patient regarding his current condition and related complications    Complexity (discuss number of co-morbidities): Discussed ADHD, depression, preventive screening measures.    Signed by: Bam Bai M.D.

## 2019-03-11 NOTE — LETTER
[facility]  Bam Bai M.D.  [address]  [place] [address]  Fax: [phone]   Authorization for Release/Disclosure of   Protected Health Information   Name: HAMMAD MOODY : 1995 SSN: [id number]   Address: [address] [place] [address]  [place] [address] Phone:    [phone] (home)    I authorize the entity listed below to release/disclose the PHI below to:   [facility]/Bam Bai M.D. and Bam Bai M.D.   Provider or Entity Name: Dr.Phyllis Rodriguez      Address   City, [facility]   Phone:      Fax:     Reason for request: continuity of care   Information to be released:    [  ] LAST COLONOSCOPY,  including any PATH REPORT and follow-up  [  ] LAST FIT/COLOGUARD RESULT [  ] LAST DEXA  [  ] LAST MAMMOGRAM  [  ] LAST PAP  [  ] LAST LABS [  ] RETINA EXAM REPORT  [  ] IMMUNIZATION RECORDS  [ x ] Release all info      [  ] Check here and initial the line next to each item to release ALL health information INCLUDING  _____ Care and treatment for drug and / or alcohol abuse  _____ HIV testing, infection status, or AIDS  _____ Genetic Testing    DATES OF SERVICE OR TIME PERIOD TO BE DISCLOSED: _____________  I understand and acknowledge that:  * This Authorization may be revoked at any time by you in writing, except if your health information has already been used or disclosed.  * Your health information that will be used or disclosed as a result of you signing this authorization could be re-disclosed by the recipient. If this occurs, your re-disclosed health information may no longer be protected by State or Federal laws.  * You may refuse to sign this Authorization. Your refusal will not affect your ability to obtain treatment.  * This Authorization becomes effective upon signing and will  on (date) __________.      If no date is indicated, this Authorization will  one (1) year from the signature date.    Name: Hammad Moody    Signature:   Date:          3/11/2019       PLEASE FAX REQUESTED RECORDS BACK TO: [phone]

## 2019-03-11 NOTE — PATIENT INSTRUCTIONS
Preventive Care 18-39 Years, Male  Preventive care refers to lifestyle choices and visits with your health care provider that can promote health and wellness.  What does preventive care include?  · A yearly physical exam. This is also called an annual well check.  · Dental exams once or twice a year.  · Routine eye exams. Ask your health care provider how often you should have your eyes checked.  · Personal lifestyle choices, including:  ¨ Daily care of your teeth and gums.  ¨ Regular physical activity.  ¨ Eating a healthy diet.  ¨ Avoiding tobacco and drug use.  ¨ Limiting alcohol use.  ¨ Practicing safe sex.  What happens during an annual well check?  The services and screenings done by your health care provider during your annual well check will depend on your age, overall health, lifestyle risk factors, and family history of disease.  Counseling   Your health care provider may ask you questions about your:  · Alcohol use.  · Tobacco use.  · Drug use.  · Emotional well-being.  · Home and relationship well-being.  · Sexual activity.  · Eating habits.  · Work and work environment.  Screening   You may have the following tests or measurements:  · Height, weight, and BMI.  · Blood pressure.  · Lipid and cholesterol levels. These may be checked every 5 years starting at age 20.  · Diabetes screening. This is done by checking your blood sugar (glucose) after you have not eaten for a while (fasting).  · Skin check.  · Hepatitis C blood test.  · Hepatitis B blood test.  · Sexually transmitted disease (STD) testing.  Discuss your test results, treatment options, and if necessary, the need for more tests with your health care provider.  Vaccines   Your health care provider may recommend certain vaccines, such as:  · Influenza vaccine. This is recommended every year.  · Tetanus, diphtheria, and acellular pertussis (Tdap, Td) vaccine. You may need a Td booster every 10 years.  · Varicella vaccine. You may need this if you  have not been vaccinated.  · HPV vaccine. If you are 26 or younger, you may need three doses over 6 months.  · Measles, mumps, and rubella (MMR) vaccine. You may need at least one dose of MMR. You may also need a second dose.  · Pneumococcal 13-valent conjugate (PCV13) vaccine. You may need this if you have certain conditions and have not been vaccinated.  · Pneumococcal polysaccharide (PPSV23) vaccine. You may need one or two doses if you smoke cigarettes or if you have certain conditions.  · Meningococcal vaccine. One dose is recommended if you are age 19-21 years and a first-year college student living in a residence [place], or if you have one of several medical conditions. You may also need additional booster doses.  · Hepatitis A vaccine. You may need this if you have certain conditions or if you travel or work in places where you may be exposed to hepatitis A.  · Hepatitis B vaccine. You may need this if you have certain conditions or if you travel or work in places where you may be exposed to hepatitis B.  · Haemophilus influenzae type b (Hib) vaccine. You may need this if you have certain risk factors.  Talk to your health care provider about which screenings and vaccines you need and how often you need them.  This information is not intended to replace advice given to you by your health care provider. Make sure you discuss any questions you have with your health care provider.  Document Released: 02/13/2003 Document Revised: 09/06/2017 Document Reviewed: 10/18/2016  [employer] Interactive Patient Education © 2017 [employer]

## 2019-03-11 NOTE — ASSESSMENT & PLAN NOTE
Dx around age 6 yrs   Stable on current medication methylphenidate 54 mg 1 tab PO once daily  Denies related symptoms

## 2019-03-11 NOTE — ASSESSMENT & PLAN NOTE
Influenza vaccination- did not have   Tdap -12/17/17  Not a candidate for colonoscopy/DEXA yet  Mammogram and pap smear not applicable

## 2019-03-11 NOTE — ASSESSMENT & PLAN NOTE
Dx 2 yrs ago  Following with therapist  Never on medication and never been to psychiatrist  Denies SI/HI

## 2019-03-18 ENCOUNTER — TELEPHONE (OUTPATIENT)
Dept: INTERNAL MEDICINE | Facility: MEDICAL CENTER | Age: 24
End: 2019-03-18

## 2019-03-18 DIAGNOSIS — F90.9 ATTENTION DEFICIT HYPERACTIVITY DISORDER (ADHD), UNSPECIFIED ADHD TYPE: ICD-10-CM

## 2019-03-18 NOTE — TELEPHONE ENCOUNTER
Pt called stating he recently got a referral to Psychiatry to continue care for his ADHD medication, however his appointment will not be able to be until July 24/2019 and he would like to see if PCP could possibly Rx him his medications at least until he is able to get in to see psychiatry.   PCP please advise.

## 2019-03-19 ENCOUNTER — HOSPITAL ENCOUNTER (OUTPATIENT)
Dept: LAB | Facility: MEDICAL CENTER | Age: 24
End: 2019-03-19
Attending: INTERNAL MEDICINE
Payer: COMMERCIAL

## 2019-03-19 DIAGNOSIS — F32.A DEPRESSION, UNSPECIFIED DEPRESSION TYPE: ICD-10-CM

## 2019-03-19 DIAGNOSIS — F90.9 ATTENTION DEFICIT HYPERACTIVITY DISORDER (ADHD), UNSPECIFIED ADHD TYPE: ICD-10-CM

## 2019-03-19 LAB
ALBUMIN SERPL BCP-MCNC: 4.6 G/DL (ref 3.2–4.9)
ALBUMIN/GLOB SERPL: 1.6 G/DL
ALP SERPL-CCNC: 52 U/L (ref 30–99)
ALT SERPL-CCNC: 16 U/L (ref 2–50)
ANION GAP SERPL CALC-SCNC: 8 MMOL/L (ref 0–11.9)
AST SERPL-CCNC: 20 U/L (ref 12–45)
BILIRUB SERPL-MCNC: 0.6 MG/DL (ref 0.1–1.5)
BUN SERPL-MCNC: 10 MG/DL (ref 8–22)
CALCIUM SERPL-MCNC: 8.7 MG/DL (ref 8.5–10.5)
CHLORIDE SERPL-SCNC: 104 MMOL/L (ref 96–112)
CO2 SERPL-SCNC: 25 MMOL/L (ref 20–33)
CREAT SERPL-MCNC: 0.95 MG/DL (ref 0.5–1.4)
GLOBULIN SER CALC-MCNC: 2.9 G/DL (ref 1.9–3.5)
GLUCOSE SERPL-MCNC: 112 MG/DL (ref 65–99)
POTASSIUM SERPL-SCNC: 3.8 MMOL/L (ref 3.6–5.5)
PROT SERPL-MCNC: 7.5 G/DL (ref 6–8.2)
SODIUM SERPL-SCNC: 137 MMOL/L (ref 135–145)
TSH SERPL DL<=0.005 MIU/L-ACNC: 2.63 UIU/ML (ref 0.38–5.33)

## 2019-03-19 PROCEDURE — 80053 COMPREHEN METABOLIC PANEL: CPT

## 2019-03-19 PROCEDURE — 36415 COLL VENOUS BLD VENIPUNCTURE: CPT

## 2019-03-19 PROCEDURE — 84443 ASSAY THYROID STIM HORMONE: CPT

## 2019-03-19 NOTE — TELEPHONE ENCOUNTER
Ordered urgent referral to psychiatry.Nacho can schedule earlier appointment for medication refill. Unfortunately his medication needs to be filled by psychiatry.

## 2019-03-19 NOTE — TELEPHONE ENCOUNTER
psychiatry office is  booked out for months , called around no one has anything available as urgent for med refills pt's are being referred back to PCP to fill meds until seen by psych

## 2019-03-21 NOTE — TELEPHONE ENCOUNTER
Ordered urgent referral to psychology where patient can get urgent refills if patient unable to see psychiatry urgently.

## 2019-03-21 NOTE — TELEPHONE ENCOUNTER
Called patient twice on each phone number ( 2 phone numbers on file) now to discuss regarding medication refills.Left voicemail to make an appointment.sent [employer] message.

## 2019-03-21 NOTE — TELEPHONE ENCOUNTER
Please fill medication even with urgent referral placed pt will still need refills to come from PCP until seen ( Sallie agreed)

## 2019-04-12 ENCOUNTER — OFFICE VISIT (OUTPATIENT)
Dept: INTERNAL MEDICINE | Facility: MEDICAL CENTER | Age: 24
End: 2019-04-12
Payer: COMMERCIAL

## 2019-04-12 VITALS
OXYGEN SATURATION: 96 % | HEIGHT: 63 IN | SYSTOLIC BLOOD PRESSURE: 106 MMHG | BODY MASS INDEX: 25.52 KG/M2 | HEART RATE: 85 BPM | DIASTOLIC BLOOD PRESSURE: 78 MMHG | TEMPERATURE: 98.2 F | WEIGHT: 144 LBS

## 2019-04-12 DIAGNOSIS — Z00.00 HEALTH CARE MAINTENANCE: ICD-10-CM

## 2019-04-12 DIAGNOSIS — M79.672 PAIN IN BOTH FEET: ICD-10-CM

## 2019-04-12 DIAGNOSIS — Z91.09 ENVIRONMENTAL ALLERGIES: ICD-10-CM

## 2019-04-12 DIAGNOSIS — F90.9 ATTENTION DEFICIT HYPERACTIVITY DISORDER (ADHD), UNSPECIFIED ADHD TYPE: ICD-10-CM

## 2019-04-12 DIAGNOSIS — M79.671 PAIN IN BOTH FEET: ICD-10-CM

## 2019-04-12 PROCEDURE — 99213 OFFICE O/P EST LOW 20 MIN: CPT | Mod: GE | Performed by: INTERNAL MEDICINE

## 2019-04-12 RX ORDER — CETIRIZINE HYDROCHLORIDE 5 MG/1
5 TABLET ORAL
Qty: 30 TAB | Refills: 2 | Status: SHIPPED | OUTPATIENT
Start: 2019-04-12 | End: 2020-02-03 | Stop reason: SDUPTHER

## 2019-04-12 RX ORDER — METHYLPHENIDATE HYDROCHLORIDE 54 MG/1
54 TABLET ORAL EVERY MORNING
Qty: 30 TAB | Refills: 0 | Status: SHIPPED | OUTPATIENT
Start: 2019-04-12 | End: 2019-05-10 | Stop reason: SDUPTHER

## 2019-04-12 ASSESSMENT — PATIENT HEALTH QUESTIONNAIRE - PHQ9
8. MOVING OR SPEAKING SO SLOWLY THAT OTHER PEOPLE COULD HAVE NOTICED. OR THE OPPOSITE, BEING SO FIGETY OR RESTLESS THAT YOU HAVE BEEN MOVING AROUND A LOT MORE THAN USUAL: NOT AT ALL
9. THOUGHTS THAT YOU WOULD BE BETTER OFF DEAD, OR OF HURTING YOURSELF: NOT AT ALL
1. LITTLE INTEREST OR PLEASURE IN DOING THINGS: NOT AT ALL
6. FEELING BAD ABOUT YOURSELF - OR THAT YOU ARE A FAILURE OR HAVE LET YOURSELF OR YOUR FAMILY DOWN: NOT AL ALL
3. TROUBLE FALLING OR STAYING ASLEEP OR SLEEPING TOO MUCH: NOT AT ALL
7. TROUBLE CONCENTRATING ON THINGS, SUCH AS READING THE NEWSPAPER OR WATCHING TELEVISION: NOT AT ALL
SUM OF ALL RESPONSES TO PHQ9 QUESTIONS 1 AND 2: 0
2. FEELING DOWN, DEPRESSED, IRRITABLE, OR HOPELESS: NOT AT ALL
5. POOR APPETITE OR OVEREATING: NOT AT ALL

## 2019-04-12 ASSESSMENT — PAIN SCALES - GENERAL: PAINLEVEL: NO PAIN

## 2019-04-12 NOTE — PATIENT INSTRUCTIONS
Attention Deficit Hyperactivity Disorder, Pediatric  Attention deficit hyperactivity disorder (ADHD) is a condition that can make it hard for a child to pay attention and concentrate or to control his or her behavior. The child may also have a lot of energy. ADHD is a disorder of the brain (neurodevelopmental disorder), and symptoms are typically first seen in early childhood. It is a common reason for behavioral and academic problems in school.  There are three main types of ADHD:  · Inattentive. With this type, children have difficulty paying attention.  · Hyperactive-impulsive. With this type, children have a lot of energy and have difficulty controlling their behavior.  · Combination. This type involves having symptoms of both of the other types.  ADHD is a lifelong condition. If it is not treated, the disorder can affect a child's future academic achievement, employment, and relationships.  What are the causes?  The exact cause of this condition is not known.  What increases the risk?  This condition is more likely to develop in:  · Children who have a first-degree relative, such as a parent or brother or sister, with the condition.  · Children who had a low birth weight.  · Children whose mothers had problems during pregnancy or used alcohol or tobacco during pregnancy.  · Children who have had a brain infection or a head injury.  · Children who have been exposed to lead.  What are the signs or symptoms?  Symptoms of this condition depend on the type of ADHD. Symptoms are listed here for each type:  Inattentive  · Problems with organization.  · Difficulty staying focused.  · Problems completing assignments at school.  · Often making simple mistakes.  · Problems sustaining mental effort.  · Not listening to instructions.  · Losing things often.  · Forgetting things often.  · Being easily distracted.  Hyperactive-impulsive  · Fidgeting often.  · Difficulty sitting still in one's seat.  · Talking a  "lot.  · Talking out of turn.  · Interrupting others.  · Difficulty relaxing or doing quiet activities.  · High energy levels and constant movement.  · Difficulty waiting.  · Always \"on the go.\"  Combination  · Having symptoms of both of the other types.  Children with ADHD may feel frustrated with themselves and may find school to be particularly discouraging. They often perform below their abilities in school.  As children get older, the excess movement can lessen, but the problems with paying attention and staying organized often continue. Most children do not outgrow ADHD, but with good treatment, they can learn to cope with the symptoms.  How is this diagnosed?  This condition is diagnosed based on a child's symptoms and academic history. The child's health care provider will do a complete assessment. As part of the assessment, the health care provider will ask the child questions and will ask the parents and teachers for their observations of the child. The health care provider looks for specific symptoms of ADHD.  Diagnosis will include:  · Ruling out other reasons for the child's behavior.  · Reviewing behavior rating scales that have been filled out about the child by people who deal with the child on a daily basis.  A diagnosis is made only after all information from multiple people has been considered.  How is this treated?  Treatment for this condition may include:  · Behavior therapy.  · Medicines to decrease impulsivity and hyperactivity and to increase attention. Behavior therapy is preferred for children younger than 6 years old. The combination of medicine and behavior therapy is most effective for children older than 6 years of age.  · Tutoring or extra support at school.  · Techniques for parents to use at home to help manage their child's symptoms and behavior.  Follow these instructions at home:  Eating and drinking  · Offer your child a well-balanced diet. Breakfast that includes a balance of " whole grains, protein, and fruits or vegetables is especially important for school performance.  · If your child has trouble with hyperactivity, have your child avoid drinks that contain caffeine. These include:  ¨ Soft drinks.  ¨ Coffee.  ¨ Tea.  · If your child is older and finds that caffeinated drinks help to improve his or her attention, talk with your child's health care provider about what amount of caffeine intake is a safe for your child.  Lifestyle  · Make sure your child gets a full night of sleep and regular daily exercise.  · Help manage your child's behavior by following the techniques learned in therapy. These may include:  ¨ Looking for good behavior and rewarding it.  ¨ Making rules for behavior that your child can understand and follow.  ¨ Giving clear instructions.  ¨ Responding consistently to your child's challenging behaviors.  ¨ Setting realistic goals.  ¨ Looking for activities that can lead to success and self-esteem.  ¨ Making time for pleasant activities with your child.  ¨ Giving lots of affection.  · Help your child learn to be organized. Some ways to do this include:  ¨ Keeping daily schedules the same. Have a regular wake-up time and bedtime for your child. Schedule all activities, including time for homework and time for play. Post the schedule in a place where your child will see it. Travis schedule changes in advance.  ¨ Having a regular place for your child to store items such as clothing, backpacks, and school supplies.  ¨ Encouraging your child to write down school assignments and to bring home needed books. Work with your child's teachers for assistance in organizing school work.  General instructions  · Learn as much as you can about ADHD. This will improve your ability to help your child and to make sure he or she gets the support needed. It will also help you educate your child's teachers and instructors if they do not feel that they have adequate knowledge or experience in  these areas.  · Work with your child's teachers to make sure your child gets the support and extra help that is needed. This may include:  ¨ Tutoring.  ¨ Teacher cues to help your child remain on task.  ¨ Seating changes so your child is working at a desk that is free from distractions.  · Give over-the-counter and prescription medicines only as told by your child's health care provider.  · Keep all follow-up visits as told by your health care provider. This is important.  Contact a health care provider if:  · Your child has repeated muscle twitches (tics), coughs, or speech outbursts.  · Your child has sleep problems.  · Your child has a marked loss of appetite.  · Your child develops depression.  · Your child has new or worsening behavioral problems.  · Your child has dizziness.  · Your child has a racing heart.  · Your child has stomach pains.  · Your child develops headaches.  Get help right away if:  · Your child talks about or threatens suicide.  · You are worried that your child is having a bad reaction to a medicine that he or she is taking for ADHD.  This information is not intended to replace advice given to you by your health care provider. Make sure you discuss any questions you have with your health care provider.  Document Released: 12/08/2003 Document Revised: 08/16/2017 Document Reviewed: 07/13/2017  [employer] Interactive Patient Education © 2017 [employer]

## 2019-04-12 NOTE — PROGRESS NOTES
Established Patient    Gildardo presents today with the following:    CC: medication refill    HPI: 23-year-old male patient with a past medical history of ADHD, depression comes in for medication refill.  Patient states he was following with Dr. Ynes Rodriguez, who went out of practice so established care in our office last visit.  Patient has a history of ADHD diagnosed at the age of 6 years.  Since then he has been on methylphenidate 54 mg 1 tablet p.o. once daily.  States his symptoms have been stable with the current medication.Last refill in march. States he is running out of medication.   Patient also has a history of depression for the past 2 years.  Following with a therapist.  Never on medication or never followed with a psychiatrist.  Denies SI/HI.  PHQ 2-0.Patient given urgent psychiatry and psychology referrals. Upcoming appointment with psychiatry in July.    Also complaints about seasonal allergies today.No fever/chills/nausea/vomiting/abdominal pain.  Patient states he stands for long hours at work. So wanted referral for podiatry for orthotic shoes.     Otherwise feels better and denies any symptoms today in the office.    Patient Active Problem List    Diagnosis Date Noted   • Health care maintenance 03/11/2019   • Attention deficit hyperactivity disorder (ADHD) 03/11/2019   • Major depressive disorder, recurrent episode, mild (HCC) 11/07/2018       Current Outpatient Prescriptions   Medication Sig Dispense Refill   • cetirizine (ZYRTEC) 5 MG tablet Take 1 Tab by mouth 1 time daily as needed for Allergies. 30 Tab 2   • methylphenidate (CONCERTA) 54 MG CR tablet Take 1 Tab by mouth every morning for 30 days. 30 Tab 0   • Omega-3 Fatty Acids (FISH OIL CONCENTRATE) 300 MG Cap Take  by mouth.     • Multiple Vitamins-Minerals (MULTIVITAMIN PO) Take  by mouth every day.       No current facility-administered medications for this visit.        Social History     Social History   • Marital status: Single      "Spouse name: N/A   • Number of children: N/A   • Years of education: N/A     Occupational History   • Not on file.     Social History Main Topics   • Smoking status: Former Smoker     Quit date: 3/11/2018   • Smokeless tobacco: Never Used   • Alcohol use No      Comment: 1 xweek   • Drug use: No      Comment: one time    • Sexual activity: Yes     Partners: Female     Other Topics Concern   • Not on file     Social History Narrative   • No narrative on file       Family History   Problem Relation Age of Onset   • Family history unknown: Yes       ROS: As per HPI. Additional pertinent symptoms as noted below.  Constitutional: Denies fever/chills/weight changes.   Eyes: Denies changes/pain in vision  ENT: Denies sore throat/congestion/ear ache.   Cardiovascular: Denies chest pain /palpitations/edema.   Respiratory: Denies SOB/cough/PND/orthopnea  Abdomen: Denies difficulty swallowing/ diarrhea/constipation/abdominal pain/nausea/vomiting  Genitourinary: Normal urinary habits.   Musculo-skeletal: normal ambulation.Denies muscle or joint pains.  Skin: Denies rash/lesions.  Neurological: Denies weakness/tingling/numbness/headache  Psychological: good mood and cooperative. Denies anxiety /depression       /78 (BP Location: Left arm, Patient Position: Sitting, BP Cuff Size: Adult)   Pulse 85   Temp 36.8 °C (98.2 °F) (Temporal)   Ht 1.605 m (5' 3.19\")   Wt 65.3 kg (144 lb)   SpO2 96%   BMI 25.36 kg/m²     Physical Exam  General:  Alert and oriented, No apparent distress.    Eyes: Pupils equal and reactive. No scleral icterus.    Throat: Clear no erythema or exudates noted.    Neck: Supple. No lymphadenopathy noted. Thyroid not enlarged.    Lungs: Clear to auscultation and percussion bilaterally.    Cardiovascular: Regular rate and rhythm. No murmurs, rubs or gallops.    Abdomen:  Benign. No rebound or guarding noted.    Extremities: No clubbing, cyanosis, edema.    Skin: Clear. No rash or suspicious skin lesions " noted.    Neurological: Oriented to time, place, and person .Cranial nerves intact. No motor/sensory deficits.Reflexes were normal and symmetrical in both upper and lower extremities     Musculoskeletal : NROM of all extremities. No tenderness or deformity noted.     Note: I have reviewed all pertinent labs and diagnostic tests associated with this visit with specific comments listed under the assessment and plan below      Assessment and Plan    1. Attention deficit hyperactivity disorder (ADHD), unspecified ADHD type  Appointment with psychiatry and psychology pending.  Chronic history.  Takes methylphenidate 54 mg 1 tab PO once daily.  Patient signed consent for controlled substance prescription.  Refilled methylphenidate for one month.    2. Pain in both feet  Ordered referral to podiatry for orthotic shoes.    3. Health care maintenance  Did not take influenza vaccine last season  Tdap -12/17/17  Mammogram/pap smear not applicable  Not due for colonoscopy/DEXA yet    4. Environmental allergies  Stable symptoms as long he takes Zyrtec.  Currently experiencing allergy symptoms-ordered Zyrtec.      Signed by: Bam Bai M.D.

## 2019-05-10 ENCOUNTER — OFFICE VISIT (OUTPATIENT)
Dept: INTERNAL MEDICINE | Facility: MEDICAL CENTER | Age: 24
End: 2019-05-10
Payer: COMMERCIAL

## 2019-05-10 VITALS
HEART RATE: 63 BPM | HEIGHT: 63 IN | WEIGHT: 147.4 LBS | TEMPERATURE: 97.7 F | SYSTOLIC BLOOD PRESSURE: 116 MMHG | DIASTOLIC BLOOD PRESSURE: 79 MMHG | BODY MASS INDEX: 26.12 KG/M2 | OXYGEN SATURATION: 97 %

## 2019-05-10 DIAGNOSIS — R07.9 CHEST PAIN, UNSPECIFIED TYPE: ICD-10-CM

## 2019-05-10 DIAGNOSIS — F90.9 ATTENTION DEFICIT HYPERACTIVITY DISORDER (ADHD), UNSPECIFIED ADHD TYPE: ICD-10-CM

## 2019-05-10 PROCEDURE — 93000 ELECTROCARDIOGRAM COMPLETE: CPT | Mod: GC | Performed by: INTERNAL MEDICINE

## 2019-05-10 PROCEDURE — 99999 EKG: CPT | Mod: GC | Performed by: INTERNAL MEDICINE

## 2019-05-10 PROCEDURE — 99214 OFFICE O/P EST MOD 30 MIN: CPT | Mod: GC | Performed by: INTERNAL MEDICINE

## 2019-05-10 RX ORDER — IBUPROFEN 400 MG/1
400 TABLET ORAL EVERY 8 HOURS PRN
Qty: 30 TAB | Refills: 1 | Status: SHIPPED | OUTPATIENT
Start: 2019-05-10 | End: 2019-07-05

## 2019-05-10 RX ORDER — METHYLPHENIDATE HYDROCHLORIDE 54 MG/1
54 TABLET ORAL EVERY MORNING
Qty: 30 TAB | Refills: 0 | Status: SHIPPED | OUTPATIENT
Start: 2019-05-10 | End: 2019-06-07 | Stop reason: SDUPTHER

## 2019-05-10 ASSESSMENT — ENCOUNTER SYMPTOMS
DIZZINESS: 0
MYALGIAS: 0
ORTHOPNEA: 0
DOUBLE VISION: 0
SHORTNESS OF BREATH: 0
ABDOMINAL PAIN: 0
BLURRED VISION: 0
COUGH: 0
VOMITING: 0
NAUSEA: 0
FEVER: 0
HEADACHES: 0
PALPITATIONS: 0
SORE THROAT: 0
WEAKNESS: 0
CHILLS: 0
FOCAL WEAKNESS: 0
WHEEZING: 0
CLAUDICATION: 0
DEPRESSION: 0

## 2019-05-10 ASSESSMENT — PAIN SCALES - GENERAL: PAINLEVEL: 5=MODERATE PAIN

## 2019-05-10 NOTE — PROGRESS NOTES
Established Patient    Gildardo presents today with the following:    CC:   Chest pain, medication refill      HPI:   23-year-old male past history of ADHD presents today for chest pain and medication refill.  Patient states that he has had chest pain over the last 2 to 3 days that initially woke him from sleep.  Describes the pain as a sharp, constant left-sided pain.  States the pain is slightly worse with palpation.  States that he does work out and has been doing physical activity at work recently.  Patient also drinks significant caffeine, both monster and 5-hour energy.  He works at night shift at the [employer]  Denies any recent trauma or injury.  Denies any relation to exertion.  Denies any family history of cardiac disease however he is adopted.  Denies any recent travel.      Patient Active Problem List    Diagnosis Date Noted   • Health care maintenance 03/11/2019   • Attention deficit hyperactivity disorder (ADHD) 03/11/2019   • Major depressive disorder, recurrent episode, mild (HCC) 11/07/2018       Current Outpatient Prescriptions   Medication Sig Dispense Refill   • methylphenidate (CONCERTA) 54 MG CR tablet Take 1 Tab by mouth every morning for 30 days. 30 Tab 0   • ibuprofen (MOTRIN) 400 MG Tab Take 1 Tab by mouth every 8 hours as needed. 30 Tab 1   • cetirizine (ZYRTEC) 5 MG tablet Take 1 Tab by mouth 1 time daily as needed for Allergies. 30 Tab 2   • Multiple Vitamins-Minerals (MULTIVITAMIN PO) Take  by mouth every day.     • Omega-3 Fatty Acids (FISH OIL CONCENTRATE) 300 MG Cap Take  by mouth.       No current facility-administered medications for this visit.        ROS: As per HPI. Additional pertinent symptoms as noted below.  Review of Systems   Constitutional: Negative for chills and fever.   HENT: Negative for hearing loss, sore throat and tinnitus.    Eyes: Negative for blurred vision and double vision.   Respiratory: Negative for cough, shortness of breath and wheezing.   "  Cardiovascular: Positive for chest pain. Negative for palpitations, orthopnea, claudication and leg swelling.   Gastrointestinal: Negative for abdominal pain, nausea and vomiting.   Genitourinary: Negative for dysuria and urgency.   Musculoskeletal: Negative for joint pain and myalgias.   Skin: Negative for itching and rash.   Neurological: Negative for dizziness, focal weakness, weakness and headaches.   Psychiatric/Behavioral: Negative for depression and suicidal ideas.       Physical Exam    /79 (BP Location: Right arm, Patient Position: Sitting)   Pulse 63   Temp 36.5 °C (97.7 °F) (Temporal)   Ht 1.6 m (5' 3\")   Wt 66.9 kg (147 lb 6.4 oz)   SpO2 97%   BMI 26.11 kg/m²     Physical Exam   Constitutional: He is oriented to person, place, and time and well-developed, well-nourished, and in no distress.   HENT:   Head: Normocephalic and atraumatic.   Mouth/Throat: No oropharyngeal exudate.   Eyes: Pupils are equal, round, and reactive to light. EOM are normal. Left eye exhibits no discharge. No scleral icterus.   Neck: Normal range of motion. Neck supple. No JVD present. No thyromegaly present.   Cardiovascular: Normal rate, regular rhythm and normal heart sounds.    No murmur heard.  Pulmonary/Chest: He exhibits tenderness.   Abdominal: Soft. Bowel sounds are normal. He exhibits no distension. There is no tenderness. There is no rebound.   Musculoskeletal: Normal range of motion. He exhibits no edema.   Neurological: He is alert and oriented to person, place, and time. No cranial nerve deficit.   Skin: Skin is warm and dry. No erythema.   Psychiatric: Affect normal.       EKG in clinic shows normal sinus rhythm, rate of 60.  No evidence of ST elevation concerning for pericarditis.       NarxCheck:   Narcotics: 0 (Value from NarxCheck System.)    Sedatives: 0 (Value from NarxCheck System.)    Stimulants: 321 (Value from NarxCheck System.)               Assessment and Plan    1. Chest pain, unspecified " type  -Patient presents with left-sided, retrosternal chest pain, sharp in nature  -Slightly worse with palpation of chest wall  -Concern for costochondritis, given age and lack of risk factors low concern for cardiac etiology  -EKG - Clinic Performed -shows normal sinus rhythm, low concern for pericarditis  -Trial of NSAIDs  - ibuprofen (MOTRIN) 400 MG Tab; Take 1 Tab by mouth every 8 hours as needed.  Dispense: 30 Tab; Refill: 1  -If chest pain does not resolve, consider additional imaging, lab testing      2. Attention deficit hyperactivity disorder (ADHD), unspecified ADHD type  -History of ADHD for many years, has been taking methylphenidate for many years without adverse reactions  - methylphenidate (CONCERTA) 54 MG CR tablet; Take 1 Tab by mouth every morning for 30 days.  Dispense: 30 Tab; Refill: 0       Follow up: Return if symptoms worsen or fail to improve.      Signed by: Pat Mosqueda M.D.

## 2019-05-10 NOTE — LETTER
May 10, 2019    To Whom It May Concern:         This is confirmation that Gildardo Moody attended his scheduled appointment with Pat Mosqueda M.D. on 5/10/19. Patient is unable return to work today.          If you have any questions please do not hesitate to call me at the phone number listed below.    Sincerely,          Pat Mosqueda M.D.  [phone]

## 2019-06-07 ENCOUNTER — OFFICE VISIT (OUTPATIENT)
Dept: INTERNAL MEDICINE | Facility: MEDICAL CENTER | Age: 24
End: 2019-06-07
Payer: COMMERCIAL

## 2019-06-07 VITALS
BODY MASS INDEX: 26.97 KG/M2 | DIASTOLIC BLOOD PRESSURE: 68 MMHG | OXYGEN SATURATION: 94 % | TEMPERATURE: 98.4 F | HEIGHT: 63 IN | SYSTOLIC BLOOD PRESSURE: 121 MMHG | HEART RATE: 64 BPM | WEIGHT: 152.2 LBS

## 2019-06-07 DIAGNOSIS — F90.9 ATTENTION DEFICIT HYPERACTIVITY DISORDER (ADHD), UNSPECIFIED ADHD TYPE: ICD-10-CM

## 2019-06-07 DIAGNOSIS — R07.89 CHEST WALL PAIN: ICD-10-CM

## 2019-06-07 PROCEDURE — 99213 OFFICE O/P EST LOW 20 MIN: CPT | Mod: GE | Performed by: INTERNAL MEDICINE

## 2019-06-07 RX ORDER — METHYLPHENIDATE HYDROCHLORIDE 54 MG/1
54 TABLET ORAL EVERY MORNING
Qty: 30 TAB | Refills: 0 | Status: SHIPPED | OUTPATIENT
Start: 2019-06-07 | End: 2019-07-07

## 2019-06-07 ASSESSMENT — PAIN SCALES - GENERAL: PAINLEVEL: 2=MINIMAL-SLIGHT

## 2019-06-07 NOTE — PROGRESS NOTES
"Psych visit in end of July with Dr Lobo Figueredo- Psychiatrist         Established Patient    Gildardo presents today with the following:    CC: Refill of ADHD medication and chest wall pain follow-up    HPI: Patient is a 24-year-old male with history of ADHD since age 6 is here to get his refill for methylphenidate.  He has a appointment with psychiatric, Dr. Lobo Knutson by the end of July, who will overtake his ADHD problem but still then he needs his refill.  Patient reports taking his medication daily.  It keeps him functional denies any side effect.  Patient exercise caution, keep his medication safe, nobody else uses it.    Chest pain: Patient was seen in last visit last month for his \"chest pain\" his EKG was WNL.  He was given ibuprofen and asked to follow-up if pain persist.  Patient reports his pain is improved and ibuprofen does help with the pain now he only takes it as needed.  But, the pain is still there at times.  The pain is on the left chest wall he can point the area of maximum tenderness.  Is not related with exertion when it comes its consistent is not related with breathing.  Does not radiate to his neck jaw or arm or anywhere else.  He does not feel any palpitation.  The pain comes at random times there is no specific association there is no modifying factors.  Denies trauma vitals stable.  Patient lifts weights      Patient Active Problem List    Diagnosis Date Noted   • Health care maintenance 03/11/2019   • Attention deficit hyperactivity disorder (ADHD) 03/11/2019   • Major depressive disorder, recurrent episode, mild (HCC) 11/07/2018       Current Outpatient Prescriptions   Medication Sig Dispense Refill   • methylphenidate (CONCERTA) 54 MG CR tablet Take 1 Tab by mouth every morning for 30 days. 30 Tab 0   • ibuprofen (MOTRIN) 400 MG Tab Take 1 Tab by mouth every 8 hours as needed. 30 Tab 1   • cetirizine (ZYRTEC) 5 MG tablet Take 1 Tab by mouth 1 time daily as needed for Allergies. 30 Tab 2 " "    No current facility-administered medications for this visit.        ROS: As per HPI. Additional pertinent systems as noted below.  Constitutional: Negative for chills and fever.   HENT: Negative for ear pain and sore throat.    Eyes: Negative for discharge and redness.   Respiratory: Negative for cough, hemoptysis, wheezing and stridor.    Cardiovascular: Negative for chest pain, palpitations and leg swelling.   Gastrointestinal: Negative for abdominal pain, constipation, diarrhea, heartburn, nausea and vomiting.   Genitourinary: Negative for dysuria, flank pain and hematuria.   Musculoskeletal: Negative for falls and myalgias.   Skin: Negative for itching and rash.   Neurological: Negative for dizziness, seizures, loss of consciousness and headaches.   Endo/Heme/Allergies: Negative for polydipsia. Does not bruise/bleed easily.   Psychiatric/Behavioral: Negative for substance abuse and suicidal ideas.       /68 (BP Location: Left arm, Patient Position: Sitting)   Pulse 64   Temp 36.9 °C (98.4 °F) (Temporal)   Ht 1.6 m (5' 2.99\")   Wt 69 kg (152 lb 3.2 oz)   SpO2 94%   BMI 26.97 kg/m²     Physical Exam   Constitutional:  oriented to person, place, and time. No distress.   Eyes: Pupils are equal, round, and reactive to light. No scleral icterus.  Neck: Neck supple. No thyromegaly present.   Cardiovascular: Normal rate, regular rhythm and normal heart sounds.  Exam reveals no gallop and no friction rub.  No murmur heard.  Pulmonary/Chest: Breath sounds normal. Chest wall is not dull to percussion.   Chest wall: Area of tenderness on left chest wall, 1 inch above his nipple .  musculoskeletal:   no edema.   Lymphadenopathy: no cervical adenopathy  Neurological: alert and oriented to person, place, and time.   Skin: No cyanosis. Nails show no clubbing.      Note: I have reviewed all pertinent labs and diagnostic tests associated with this visit with specific comments listed under the assessment and plan " below    Assessment and Plan    1. Chest wall pain  -Reviewed EKG.  Musculoskeletal chest wall pain-likely secondary to muscle injury from weightlifting.  Recommend heat, pain balm, stretching, as needed NSAIDs    2. Attention deficit hyperactivity disorder (ADHD), unspecified ADHD type  -Refilled methylphenidate, patient will be a good candidate for random urine drug screen for monitoring purposes-consider on next visit.        Followup: Return in about 4 weeks (around 7/5/2019), or if symptoms worsen or fail to improve, for keep the scheduled appointment in July .      Signed by: Manuel Da Silva M.D.

## 2019-07-05 ENCOUNTER — OFFICE VISIT (OUTPATIENT)
Dept: URGENT CARE | Facility: PHYSICIAN GROUP | Age: 24
End: 2019-07-05
Payer: COMMERCIAL

## 2019-07-05 VITALS
SYSTOLIC BLOOD PRESSURE: 124 MMHG | TEMPERATURE: 97.8 F | HEART RATE: 90 BPM | RESPIRATION RATE: 14 BRPM | BODY MASS INDEX: 26.29 KG/M2 | WEIGHT: 154 LBS | HEIGHT: 64 IN | OXYGEN SATURATION: 95 % | DIASTOLIC BLOOD PRESSURE: 82 MMHG

## 2019-07-05 DIAGNOSIS — R10.9 BELLY PAIN: ICD-10-CM

## 2019-07-05 LAB
APPEARANCE UR: CLEAR
BILIRUB UR STRIP-MCNC: NEGATIVE MG/DL
COLOR UR AUTO: YELLOW
GLUCOSE UR STRIP.AUTO-MCNC: NEGATIVE MG/DL
KETONES UR STRIP.AUTO-MCNC: NEGATIVE MG/DL
LEUKOCYTE ESTERASE UR QL STRIP.AUTO: NEGATIVE
NITRITE UR QL STRIP.AUTO: NEGATIVE
PH UR STRIP.AUTO: 7 [PH] (ref 5–8)
PROT UR QL STRIP: NORMAL MG/DL
RBC UR QL AUTO: NORMAL
SP GR UR STRIP.AUTO: 1.02
UROBILINOGEN UR STRIP-MCNC: 1 MG/DL

## 2019-07-05 PROCEDURE — 81002 URINALYSIS NONAUTO W/O SCOPE: CPT | Performed by: FAMILY MEDICINE

## 2019-07-05 PROCEDURE — 99214 OFFICE O/P EST MOD 30 MIN: CPT | Performed by: FAMILY MEDICINE

## 2019-07-05 RX ORDER — HYOSCYAMINE SULFATE 0.125 MG
125 TABLET ORAL EVERY 6 HOURS PRN
Qty: 15 TAB | Refills: 0 | Status: SHIPPED | OUTPATIENT
Start: 2019-07-05 | End: 2019-11-05

## 2019-07-05 RX ORDER — ONDANSETRON 4 MG/1
4 TABLET, ORALLY DISINTEGRATING ORAL EVERY 8 HOURS PRN
Qty: 12 TAB | Refills: 0 | Status: SHIPPED | OUTPATIENT
Start: 2019-07-05 | End: 2019-11-05

## 2019-07-05 ASSESSMENT — ENCOUNTER SYMPTOMS
VOMITING: 1
NAUSEA: 1
ABDOMINAL PAIN: 1

## 2019-07-05 NOTE — PROGRESS NOTES
"Subjective:      Gildardo Moody is a 24 y.o. male who presents with Nausea (loss of appetite vomiting not today  but the past days )      - This is a pleasant and non toxic appearing 24 y.o. male with c/o ~2-3 days w/ some nausea and some abd cramps. Vomited a cpl times, non bloody. No fever or stool changes           ALLERGIES:  Patient has no known allergies.     PMH:  Past Medical History:   Diagnosis Date   • ADHD (attention deficit hyperactivity disorder)    • Allergy    • ASTHMA    • Depression         PSH:  Past Surgical History:   Procedure Laterality Date   • SHOULDER ARTHROSCOPY W/ SLAP / LABRAL REPAIR  3/13/2014    Performed by Fabián Rosenberg M.D. at [facility]   • OTHER  1/2014    left shoulder dislocation put back in place   • OTHER  1994    detached retina OU       MEDS:    Current Outpatient Prescriptions:   •  ondansetron (ZOFRAN ODT) 4 MG TABLET DISPERSIBLE, Take 1 Tab by mouth every 8 hours as needed., Disp: 12 Tab, Rfl: 0  •  hyoscyamine (ANASPAZ, LEVSIN) 0.125 MG tablet, Take 1 Tab by mouth every 6 hours as needed for Cramping., Disp: 15 Tab, Rfl: 0  •  methylphenidate (CONCERTA) 54 MG CR tablet, Take 1 Tab by mouth every morning for 30 days., Disp: 30 Tab, Rfl: 0  •  cetirizine (ZYRTEC) 5 MG tablet, Take 1 Tab by mouth 1 time daily as needed for Allergies., Disp: 30 Tab, Rfl: 2    ** I have documented what I find to be significant in regards to past medical, social, family and surgical history  in my HPI or under PMH/PSH/FH review section, otherwise it is contributory **         HPI    Review of Systems   Gastrointestinal: Positive for abdominal pain, nausea and vomiting.   All other systems reviewed and are negative.         Objective:     /82   Pulse 90   Temp 36.6 °C (97.8 °F) (Temporal)   Resp 14   Ht 1.626 m (5' 4\")   Wt 69.9 kg (154 lb)   SpO2 95%   BMI 26.43 kg/m²      Physical Exam   Constitutional: He appears well-developed. No distress.   HENT: "   Head: Normocephalic and atraumatic.   Mouth/Throat: Oropharynx is clear and moist.   Eyes: Conjunctivae are normal.   Neck: Neck supple.   Cardiovascular: Regular rhythm.    No murmur heard.  Pulmonary/Chest: Effort normal. No respiratory distress.   Abdominal: Soft. He exhibits no distension. There is no tenderness. There is no rebound and no guarding.   Neurological: He is alert. He exhibits normal muscle tone.   Skin: Skin is warm and dry.   Psychiatric: He has a normal mood and affect. Judgment normal.   Nursing note and vitals reviewed.              Assessment/Plan:         1. Belly pain  POCT Urinalysis    ondansetron (ZOFRAN ODT) 4 MG TABLET DISPERSIBLE    hyoscyamine (ANASPAZ, LEVSIN) 0.125 MG tablet     - rest/hydrate         Dx & d/c instructions discussed w/ patient and/or family members.     Follow up with PCP (or here if PCP unavailable) in 2-3 days if symptoms not improving, ER if feeling/getting worse.    Any realistic and/or common medication side effects that may have been given today(i.e. Rash, GI upset/constipation, sedation, elevation of BP or blood sugars) reviewed.     Patient left in stable condition

## 2019-07-05 NOTE — LETTER
July 5, 2019         Patient: Gildardo Moody   YOB: 1995   Date of Visit: 7/5/2019           To Whom it May Concern:    Gildardo Moody was seen in my clinic on 7/5/2019. He may return to work in 1-3 days, excuse recent missed days work this week.    If you have any questions or concerns, please don't hesitate to call.        Sincerely,           Jose Juan Crowley M.D.  Electronically Signed

## 2019-07-24 ENCOUNTER — APPOINTMENT (OUTPATIENT)
Dept: BEHAVIORAL HEALTH | Facility: CLINIC | Age: 24
End: 2019-07-24
Payer: COMMERCIAL

## 2019-09-13 ENCOUNTER — OFFICE VISIT (OUTPATIENT)
Dept: URGENT CARE | Facility: PHYSICIAN GROUP | Age: 24
End: 2019-09-13
Payer: COMMERCIAL

## 2019-09-13 VITALS
HEIGHT: 64 IN | HEART RATE: 82 BPM | DIASTOLIC BLOOD PRESSURE: 78 MMHG | OXYGEN SATURATION: 96 % | SYSTOLIC BLOOD PRESSURE: 118 MMHG | TEMPERATURE: 98 F | RESPIRATION RATE: 14 BRPM | WEIGHT: 155 LBS | BODY MASS INDEX: 26.46 KG/M2

## 2019-09-13 DIAGNOSIS — J01.40 ACUTE PANSINUSITIS, RECURRENCE NOT SPECIFIED: ICD-10-CM

## 2019-09-13 PROCEDURE — 99214 OFFICE O/P EST MOD 30 MIN: CPT | Performed by: NURSE PRACTITIONER

## 2019-09-13 RX ORDER — AMOXICILLIN AND CLAVULANATE POTASSIUM 875; 125 MG/1; MG/1
1 TABLET, FILM COATED ORAL 2 TIMES DAILY
Qty: 14 TAB | Refills: 0 | Status: SHIPPED | OUTPATIENT
Start: 2019-09-13 | End: 2019-09-20

## 2019-09-13 ASSESSMENT — ENCOUNTER SYMPTOMS
SORE THROAT: 1
COUGH: 0
NECK PAIN: 0
SINUS PAIN: 1
HEADACHES: 0
CHILLS: 0
EYE DISCHARGE: 0
FEVER: 0
CONSTIPATION: 0
DIZZINESS: 0
NAUSEA: 0
WEAKNESS: 0
MYALGIAS: 0
ABDOMINAL PAIN: 0
VOMITING: 0
EYE REDNESS: 0
DIARRHEA: 0

## 2019-09-13 NOTE — PROGRESS NOTES
Subjective:      Gildardo Moody is a 24 y.o. male who presents with Sinus Problem (sinus pressure throat and ear pain )            HPI  Sore throat, Ibuprofen. Nasal congestion, facial/ear pressure x 1 week. Denies fever, cough. Nasal d/c yellow. Allergy med. H/o allergies. Has flonase, not using. Requesting work note.    PMH:  has a past medical history of ADHD (attention deficit hyperactivity disorder), Allergy, ASTHMA, and Depression.  MEDS:   Current Outpatient Medications:   •  cetirizine (ZYRTEC) 5 MG tablet, Take 1 Tab by mouth 1 time daily as needed for Allergies., Disp: 30 Tab, Rfl: 2  •  ondansetron (ZOFRAN ODT) 4 MG TABLET DISPERSIBLE, Take 1 Tab by mouth every 8 hours as needed., Disp: 12 Tab, Rfl: 0  •  hyoscyamine (ANASPAZ, LEVSIN) 0.125 MG tablet, Take 1 Tab by mouth every 6 hours as needed for Cramping., Disp: 15 Tab, Rfl: 0  ALLERGIES: No Known Allergies  SURGHX:   Past Surgical History:   Procedure Laterality Date   • SHOULDER ARTHROSCOPY W/ SLAP / LABRAL REPAIR  3/13/2014    Performed by Fabián Rosenberg M.D. at [facility]   • OTHER  1/2014    left shoulder dislocation put back in place   • OTHER  1994    detached retina OU     SOCHX:  reports that he quit smoking about 18 months ago. He has never used smokeless tobacco. He reports that he drinks alcohol. He reports that he does not use drugs.  FH: Family history was reviewed, no pertinent findings to report    Review of Systems   Constitutional: Positive for malaise/fatigue. Negative for chills and fever.   HENT: Positive for congestion, ear pain, sinus pain and sore throat.    Eyes: Negative for discharge and redness.   Respiratory: Negative for cough.    Gastrointestinal: Negative for abdominal pain, constipation, diarrhea, nausea and vomiting.   Musculoskeletal: Negative for myalgias and neck pain.   Skin: Negative for itching and rash.   Neurological: Negative for dizziness, weakness and headaches.   Endo/Heme/Allergies:  "Positive for environmental allergies.   All other systems reviewed and are negative.         Objective:     /78   Pulse 82   Temp 36.7 °C (98 °F) (Temporal)   Resp 14   Ht 1.626 m (5' 4\")   Wt 70.3 kg (155 lb)   SpO2 96%   BMI 26.61 kg/m²      Physical Exam   Constitutional: He is oriented to person, place, and time. Vital signs are normal. He appears well-developed and well-nourished. He is active and cooperative.  Non-toxic appearance. He does not have a sickly appearance. He does not appear ill. No distress.   HENT:   Head: Normocephalic.   Right Ear: External ear and ear canal normal. A middle ear effusion is present.   Left Ear: External ear and ear canal normal. A middle ear effusion is present.   Nose: Mucosal edema, rhinorrhea and sinus tenderness present. Right sinus exhibits maxillary sinus tenderness and frontal sinus tenderness. Left sinus exhibits maxillary sinus tenderness and frontal sinus tenderness.   Mouth/Throat: Uvula is midline. Mucous membranes are dry. No uvula swelling. Posterior oropharyngeal edema and posterior oropharyngeal erythema present. Tonsils are 1+ on the right. Tonsils are 1+ on the left. No tonsillar exudate.   Eyes: Pupils are equal, round, and reactive to light. Conjunctivae and EOM are normal.   Neck: Normal range of motion. Neck supple.   Cardiovascular: Normal rate.   Pulmonary/Chest: Effort normal.   Musculoskeletal: Normal range of motion.   Lymphadenopathy:     He has no cervical adenopathy.   Neurological: He is alert and oriented to person, place, and time.   Skin: Skin is warm and dry. He is not diaphoretic.   Vitals reviewed.              Assessment/Plan:     1. Acute pansinusitis, recurrence not specified    - amoxicillin-clavulanate (AUGMENTIN) 875-125 MG Tab; Take 1 Tab by mouth 2 times a day for 7 days.  Dispense: 14 Tab; Refill: 0    Increase water intake  Get rest  May use Ibuprofen/Tylenol prn for any fever, body aches or throat pain  May take " longer acting antihistamine for seasonal allergy symptoms prn  May use saline nasal spray for nasal congestion  May use Flonase or Nasocort for allergen nasal congestion  May gargle with salt water prn for throat discomfort  May drink smoothies for nutrition if too painful to swallow solid foods  Monitor for productive cough, SOB and chest pain/tightness- need re-evaluation

## 2019-09-13 NOTE — LETTER
September 13, 2019       Patient: Gildardo Moody   YOB: 1995   Date of Visit: 9/13/2019         To Whom It May Concern:    It is my medical opinion that Gildardo Moody be excused from work today due to illness.    If you have any questions or concerns, please don't hesitate to call [phone]          Sincerely,          GLEN Rasheed.  Electronically Signed

## 2019-11-01 ENCOUNTER — OFFICE VISIT (OUTPATIENT)
Dept: URGENT CARE | Facility: PHYSICIAN GROUP | Age: 24
End: 2019-11-01
Payer: COMMERCIAL

## 2019-11-01 VITALS
TEMPERATURE: 98.1 F | SYSTOLIC BLOOD PRESSURE: 120 MMHG | HEART RATE: 82 BPM | RESPIRATION RATE: 16 BRPM | OXYGEN SATURATION: 97 % | WEIGHT: 155 LBS | DIASTOLIC BLOOD PRESSURE: 78 MMHG | HEIGHT: 64 IN | BODY MASS INDEX: 26.46 KG/M2

## 2019-11-01 DIAGNOSIS — B97.89 VIRAL SORE THROAT: Primary | ICD-10-CM

## 2019-11-01 DIAGNOSIS — J02.8 VIRAL SORE THROAT: Primary | ICD-10-CM

## 2019-11-01 DIAGNOSIS — J06.9 URI, ACUTE: ICD-10-CM

## 2019-11-01 LAB
INT CON NEG: NEGATIVE
INT CON POS: POSITIVE
S PYO AG THROAT QL: NEGATIVE

## 2019-11-01 PROCEDURE — 99214 OFFICE O/P EST MOD 30 MIN: CPT | Performed by: PHYSICIAN ASSISTANT

## 2019-11-01 PROCEDURE — 87880 STREP A ASSAY W/OPTIC: CPT | Performed by: PHYSICIAN ASSISTANT

## 2019-11-01 RX ORDER — METHYLPHENIDATE HYDROCHLORIDE 54 MG/1
54 TABLET ORAL EVERY MORNING
COMMUNITY
End: 2019-11-06 | Stop reason: SDUPTHER

## 2019-11-01 NOTE — LETTER
November 1, 2019         Patient: Gildardo Moody   YOB: 1995   Date of Visit: 11/1/2019           To Whom it May Concern:    Gildardo Moody was seen in my clinic on 11/1/2019 for an illness that began 10/30/2019.  Please excuse his absences from work due to this illness.     He may return to work on 11/2/2019.    If you have any questions or concerns, please don't hesitate to call.        Sincerely,           Danielle Trinidad P.A.-C.  Electronically Signed

## 2019-11-02 NOTE — PROGRESS NOTES
Subjective:      Gildardo Moody is a 24 y.o. male who presents with Sinus Problem (drainage, headache, congestion, sore throat x 4 days)    PMH:  has a past medical history of ADHD (attention deficit hyperactivity disorder), Allergy, ASTHMA, and Depression.  MEDS:   Current Outpatient Medications:   •  cetirizine (ZYRTEC) 5 MG tablet, Take 1 Tab by mouth 1 time daily as needed for Allergies., Disp: 30 Tab, Rfl: 2  •  Multiple Vitamins-Minerals (MULTIVITAMIN ADULT) Tab, Take  by mouth., Disp: , Rfl:   •  [START ON 1/6/2020] methylphenidate (CONCERTA) 54 MG CR tablet, Take 1 Tab by mouth every morning for 30 days., Disp: 30 Tab, Rfl: 0  •  azithromycin (ZITHROMAX) 250 MG Tab, Take two tabs po day one followed by one tab po day two through five with food, Disp: 6 Tab, Rfl: 0  •  albuterol 108 (90 Base) MCG/ACT Aero Soln inhalation aerosol, Inhale 1-2 Puffs by mouth every four hours as needed for Shortness of Breath., Disp: 1 Inhaler, Rfl: 0  •  benzonatate (TESSALON) 100 MG Cap, Take 1 Cap by mouth 3 times a day as needed for Cough., Disp: 30 Cap, Rfl: 0  ALLERGIES: No Known Allergies  SURGHX:   Past Surgical History:   Procedure Laterality Date   • SHOULDER ARTHROSCOPY W/ SLAP / LABRAL REPAIR  3/13/2014    Performed by Fabián Rosenberg M.D. at [facility]   • OTHER  1/2014    left shoulder dislocation put back in place   • OTHER  1994    detached retina OU     SOCHX:  reports that he quit smoking about 19 months ago. He smoked 0.00 packs per day. He has never used smokeless tobacco. He reports current alcohol use. He reports that he does not use drugs.  FH: Reviewed with patient, not pertinent to this visit.           Patient presents with:  Sinus Problem: drainage, headache, congestion, sore throat x 4 days        URI    This is a new problem. The current episode started in the past 7 days. The problem has been unchanged. There has been no fever. Associated symptoms include congestion, coughing,  "headaches, rhinorrhea and a sore throat. Pertinent negatives include no plugged ear sensation, sinus pain, swollen glands or wheezing. He has tried increased fluids (dayquil/nyquil) for the symptoms. The treatment provided no relief.       Review of Systems   Constitutional: Negative for chills and fever.   HENT: Positive for congestion, rhinorrhea and sore throat. Negative for sinus pain.    Respiratory: Positive for cough. Negative for sputum production and wheezing.    Neurological: Positive for headaches.   All other systems reviewed and are negative.         Objective:     /78 (BP Location: Left arm, Patient Position: Sitting, BP Cuff Size: Adult)   Pulse 82   Temp 36.7 °C (98.1 °F) (Temporal)   Resp 16   Ht 1.626 m (5' 4\")   Wt 70.3 kg (155 lb)   SpO2 97%   BMI 26.61 kg/m²      Physical Exam  Vitals signs and nursing note reviewed.   Constitutional:       General: He is not in acute distress.     Appearance: He is well-developed.   HENT:      Head: Normocephalic and atraumatic.      Right Ear: Tympanic membrane normal.      Left Ear: Tympanic membrane normal.      Nose: Mucosal edema and rhinorrhea present.      Mouth/Throat:      Pharynx: Uvula midline. Posterior oropharyngeal erythema present.   Eyes:      Conjunctiva/sclera: Conjunctivae normal.      Pupils: Pupils are equal, round, and reactive to light.   Neck:      Musculoskeletal: Normal range of motion and neck supple.   Cardiovascular:      Rate and Rhythm: Normal rate and regular rhythm.      Heart sounds: Normal heart sounds.   Pulmonary:      Effort: Pulmonary effort is normal.      Breath sounds: No decreased breath sounds.   Abdominal:      Palpations: Abdomen is soft.   Musculoskeletal: Normal range of motion.   Lymphadenopathy:      Cervical: No cervical adenopathy.   Skin:     General: Skin is warm and dry.      Capillary Refill: Capillary refill takes less than 2 seconds.   Neurological:      Mental Status: He is alert and " oriented to person, place, and time.      Gait: Gait normal.            Strep: neg     Assessment/Plan:     1. Viral sore throat  POCT Rapid Strep A   2. URI, acute       Discussed that I felt this was viral in nature. Did not see any evidence of a bacterial process. Discussed natural progression and sx care.    PT can continue OTC medications, increase fluids and rest until symptoms improve.     PT advised saltwater gargles/swishes  3-4 times daily until symptoms improve.     PT should follow up with PCP in 1-2 days for re-evaluation if symptoms have not improved.  Discussed red flags and reasons to return to UC or ED.  Pt and/or family verbalized understanding of diagnosis and follow up instructions and was offered informational handout on diagnosis.  PT discharged.

## 2019-11-05 ENCOUNTER — HOSPITAL ENCOUNTER (OUTPATIENT)
Dept: RADIOLOGY | Facility: MEDICAL CENTER | Age: 24
End: 2019-11-05
Attending: NURSE PRACTITIONER
Payer: COMMERCIAL

## 2019-11-05 ENCOUNTER — OFFICE VISIT (OUTPATIENT)
Dept: URGENT CARE | Facility: PHYSICIAN GROUP | Age: 24
End: 2019-11-05
Payer: COMMERCIAL

## 2019-11-05 VITALS
TEMPERATURE: 99.8 F | OXYGEN SATURATION: 97 % | SYSTOLIC BLOOD PRESSURE: 132 MMHG | HEART RATE: 92 BPM | BODY MASS INDEX: 26.63 KG/M2 | HEIGHT: 64 IN | WEIGHT: 156 LBS | DIASTOLIC BLOOD PRESSURE: 82 MMHG | RESPIRATION RATE: 20 BRPM

## 2019-11-05 DIAGNOSIS — J18.9 PNEUMONIA OF RIGHT MIDDLE LOBE DUE TO INFECTIOUS ORGANISM: ICD-10-CM

## 2019-11-05 DIAGNOSIS — R05.9 COUGH: ICD-10-CM

## 2019-11-05 PROCEDURE — 71046 X-RAY EXAM CHEST 2 VIEWS: CPT

## 2019-11-05 PROCEDURE — 94640 AIRWAY INHALATION TREATMENT: CPT | Performed by: NURSE PRACTITIONER

## 2019-11-05 PROCEDURE — 99214 OFFICE O/P EST MOD 30 MIN: CPT | Mod: 25 | Performed by: NURSE PRACTITIONER

## 2019-11-05 RX ORDER — AZITHROMYCIN 250 MG/1
TABLET, FILM COATED ORAL
Qty: 6 TAB | Refills: 0 | Status: SHIPPED | OUTPATIENT
Start: 2019-11-05 | End: 2020-12-16

## 2019-11-05 RX ORDER — BENZONATATE 100 MG/1
100 CAPSULE ORAL 3 TIMES DAILY PRN
Qty: 30 CAP | Refills: 0 | Status: SHIPPED | OUTPATIENT
Start: 2019-11-05 | End: 2020-12-16

## 2019-11-05 RX ORDER — ALBUTEROL SULFATE 2.5 MG/3ML
2.5 SOLUTION RESPIRATORY (INHALATION) ONCE
Status: COMPLETED | OUTPATIENT
Start: 2019-11-05 | End: 2019-11-05

## 2019-11-05 RX ORDER — ALBUTEROL SULFATE 90 UG/1
1-2 AEROSOL, METERED RESPIRATORY (INHALATION) EVERY 4 HOURS PRN
Qty: 1 INHALER | Refills: 0 | Status: SHIPPED | OUTPATIENT
Start: 2019-11-05 | End: 2020-12-16

## 2019-11-05 RX ADMIN — ALBUTEROL SULFATE 2.5 MG: 2.5 SOLUTION RESPIRATORY (INHALATION) at 13:30

## 2019-11-05 ASSESSMENT — ENCOUNTER SYMPTOMS
SORE THROAT: 1
BLURRED VISION: 0
CONSTIPATION: 0
CHILLS: 1
WHEEZING: 0
DIZZINESS: 0
COUGH: 1
FEVER: 1
NAUSEA: 0
VOMITING: 0
PALPITATIONS: 0
HEADACHES: 0
ABDOMINAL PAIN: 0
STRIDOR: 0
MYALGIAS: 1
SPUTUM PRODUCTION: 1
DIARRHEA: 0
DOUBLE VISION: 0

## 2019-11-05 NOTE — PROGRESS NOTES
"Subjective:   Gildardo Moody is a 24 y.o. male who presents for Cough (runny nose, feverish, body aches, rash x 12 days  last seen 11/01 for  the same sx's)        Cough   This is a recurrent problem. The current episode started 1 to 4 weeks ago. The problem has been gradually worsening. The problem occurs every few minutes. The cough is productive of blood-tinged sputum. Associated symptoms include chills, ear congestion, a fever, myalgias, nasal congestion, postnasal drip, a rash and a sore throat. Pertinent negatives include no chest pain, ear pain, headaches or wheezing. The symptoms are aggravated by lying down. He has tried OTC cough suppressant for the symptoms. The treatment provided mild relief. His past medical history is significant for asthma and environmental allergies.      Was seen in urgent care several days ago and was told viral and to take OTC medications and should resolve. States he is getting worse.    Review of Systems   Constitutional: Positive for chills and fever.   HENT: Positive for congestion, postnasal drip and sore throat. Negative for ear discharge and ear pain.    Eyes: Negative for blurred vision and double vision.   Respiratory: Positive for cough and sputum production. Negative for wheezing and stridor.    Cardiovascular: Negative for chest pain and palpitations.   Gastrointestinal: Negative for abdominal pain, constipation, diarrhea, nausea and vomiting.   Musculoskeletal: Positive for myalgias.   Skin: Positive for itching and rash.   Neurological: Negative for dizziness and headaches.   Endo/Heme/Allergies: Positive for environmental allergies.   All other systems reviewed and are negative.    Patient's PMH, SocHx, SurgHx, FamHx, Drug allergies and medications reviewed.     Objective:   /82 (BP Location: Left arm, Patient Position: Sitting, BP Cuff Size: Adult)   Pulse 92   Temp 37.7 °C (99.8 °F) (Temporal)   Resp 20   Ht 1.626 m (5' 4\")   Wt 70.8 kg (156 " lb)   SpO2 97%   BMI 26.78 kg/m²   Physical Exam  Vitals signs reviewed.   Constitutional:       Appearance: He is well-developed.   HENT:      Head: Normocephalic.      Right Ear: Tympanic membrane and ear canal normal. No middle ear effusion. Tympanic membrane is not perforated or erythematous.      Left Ear: Tympanic membrane and ear canal normal.  No middle ear effusion. Tympanic membrane is not perforated or erythematous.      Nose: Nose normal. No rhinorrhea.      Right Turbinates: Swollen.      Left Turbinates: Swollen.      Right Sinus: No maxillary sinus tenderness or frontal sinus tenderness.      Left Sinus: No maxillary sinus tenderness or frontal sinus tenderness.      Mouth/Throat:      Lips: Pink.      Mouth: Mucous membranes are moist.      Pharynx: Oropharynx is clear. Uvula midline. Posterior oropharyngeal erythema present. No oropharyngeal exudate or uvula swelling.      Tonsils: No tonsillar exudate.   Eyes:      General: Lids are normal.      Extraocular Movements: Extraocular movements intact.      Conjunctiva/sclera: Conjunctivae normal.      Pupils: Pupils are equal, round, and reactive to light.   Neck:      Musculoskeletal: Normal range of motion.      Thyroid: No thyromegaly.   Cardiovascular:      Rate and Rhythm: Normal rate and regular rhythm.      Heart sounds: Normal heart sounds.   Pulmonary:      Effort: Pulmonary effort is normal. No tachypnea, bradypnea, accessory muscle usage, prolonged expiration or respiratory distress.      Breath sounds: No stridor or decreased air movement. Examination of the right-lower field reveals wheezing. Examination of the left-lower field reveals wheezing. Wheezing present. No decreased breath sounds.   Lymphadenopathy:      Head:      Right side of head: No submandibular or tonsillar adenopathy.      Left side of head: No submandibular or tonsillar adenopathy.   Skin:     General: Skin is warm and dry.      Findings: Rash present.              "Comments: Mildly erythematous macular rash noted. No vesicles or pustules noted.    Neurological:      Mental Status: He is alert and oriented to person, place, and time.   Psychiatric:         Mood and Affect: Mood normal.         Speech: Speech normal.         Behavior: Behavior normal. Behavior is cooperative.         Thought Content: Thought content normal.         Judgment: Judgment normal.           Assessment/Plan:   Assessment    1. Cough  - albuterol (PROVENTIL) 2.5mg/3ml nebulizer solution 2.5 mg  - DX-CHEST-2 VIEWS; Future  - albuterol 108 (90 Base) MCG/ACT Aero Soln inhalation aerosol; Inhale 1-2 Puffs by mouth every four hours as needed for Shortness of Breath.  Dispense: 1 Inhaler; Refill: 0  - benzonatate (TESSALON) 100 MG Cap; Take 1 Cap by mouth 3 times a day as needed for Cough.  Dispense: 30 Cap; Refill: 0    2. Pneumonia of right middle lobe due to infectious organism (HCC)  - azithromycin (ZITHROMAX) 250 MG Tab; Take two tabs po day one followed by one tab po day two through five with food  Dispense: 6 Tab; Refill: 0  - albuterol 108 (90 Base) MCG/ACT Aero Soln inhalation aerosol; Inhale 1-2 Puffs by mouth every four hours as needed for Shortness of Breath.  Dispense: 1 Inhaler; Refill: 0    Xray:\"Mild opacification in the right middle lobe may represent early pneumonia or atelectasis.\"  Albuterol breathing treatment given in office with much improvement to respiratory wheezing. Discussed proper inhaler use for at home.  Strict ER precautions discussed    Differential diagnosis, natural history, supportive care, and indications for immediate follow-up discussed.     **Please note that all invasive procedures during this visit were performed by myself and/or the Medical Assistant under the supervision of the PA or MD in office**      "

## 2019-11-06 ENCOUNTER — OFFICE VISIT (OUTPATIENT)
Dept: MEDICAL GROUP | Facility: LAB | Age: 24
End: 2019-11-06
Payer: COMMERCIAL

## 2019-11-06 VITALS
WEIGHT: 157.2 LBS | HEART RATE: 101 BPM | SYSTOLIC BLOOD PRESSURE: 126 MMHG | OXYGEN SATURATION: 96 % | DIASTOLIC BLOOD PRESSURE: 82 MMHG | HEIGHT: 64 IN | TEMPERATURE: 98.8 F | BODY MASS INDEX: 26.84 KG/M2

## 2019-11-06 DIAGNOSIS — F90.8 ATTENTION DEFICIT HYPERACTIVITY DISORDER (ADHD), OTHER TYPE: ICD-10-CM

## 2019-11-06 PROCEDURE — 99204 OFFICE O/P NEW MOD 45 MIN: CPT | Performed by: FAMILY MEDICINE

## 2019-11-06 RX ORDER — METHYLPHENIDATE HYDROCHLORIDE 54 MG/1
54 TABLET ORAL EVERY MORNING
Qty: 30 TAB | Refills: 0 | Status: SHIPPED | OUTPATIENT
Start: 2019-12-06 | End: 2019-11-06 | Stop reason: SDUPTHER

## 2019-11-06 RX ORDER — ELECTROLYTES/DEXTROSE
SOLUTION, ORAL ORAL
COMMUNITY
End: 2023-03-23

## 2019-11-06 RX ORDER — METHYLPHENIDATE HYDROCHLORIDE 54 MG/1
54 TABLET ORAL EVERY MORNING
Qty: 30 TAB | Refills: 0 | Status: SHIPPED | OUTPATIENT
Start: 2019-11-06 | End: 2019-11-06 | Stop reason: SDUPTHER

## 2019-11-06 RX ORDER — METHYLPHENIDATE HYDROCHLORIDE 54 MG/1
54 TABLET ORAL EVERY MORNING
Qty: 30 TAB | Refills: 0 | Status: SHIPPED | OUTPATIENT
Start: 2020-01-06 | End: 2020-02-03 | Stop reason: SDUPTHER

## 2019-11-06 ASSESSMENT — ENCOUNTER SYMPTOMS
RHINORRHEA: 1
FEVER: 0
WHEEZING: 0
HEADACHES: 1
COUGH: 1
CHILLS: 0
SORE THROAT: 1
SWOLLEN GLANDS: 0
SPUTUM PRODUCTION: 0
SINUS PAIN: 0

## 2019-11-06 NOTE — PROGRESS NOTES
Subjective:     CC:  The encounter diagnosis was Attention deficit hyperactivity disorder (ADHD), other type.    HISTORY OF THE PRESENT ILLNESS: Patient is a 24 y.o. male. This pleasant patient is here today    ADHD:  This is a chronic stable issue, new to me.  Patient has a history of ADHD diagnosed at age 8 per patient.  He was in plenty of disability learning programs and had assistance with timed exams.  He has sought therapy in the past for it as well as behavioral therapy.  He has concurrent depression.  He has been on Concerta since he is lived in [place] at the same dose.  He denies any side effects with the exception of low appetite which in his case helps as he has a hyper appetite when off the medication.  He denies any insomnia or palpitations.  He describes his ADHD as more inattentiveness but also does have a history of hyperactivity.  He was previous to getting his medication by his PCP Dr. Rodriguez however since her practice close down patient was seen at the [facility] internal medicine clinic.  He has now switched over to us until he is scheduled to see his psychiatrist Dr. Che in July.  Patient not on any other controlled substances.    Allergies: Patient has no known allergies.    Current Outpatient Medications Ordered in Epic   Medication Sig Dispense Refill   • Multiple Vitamins-Minerals (MULTIVITAMIN ADULT) Tab Take  by mouth.     • [START ON 1/6/2020] methylphenidate (CONCERTA) 54 MG CR tablet Take 1 Tab by mouth every morning for 30 days. 30 Tab 0   • azithromycin (ZITHROMAX) 250 MG Tab Take two tabs po day one followed by one tab po day two through five with food 6 Tab 0   • albuterol 108 (90 Base) MCG/ACT Aero Soln inhalation aerosol Inhale 1-2 Puffs by mouth every four hours as needed for Shortness of Breath. 1 Inhaler 0   • benzonatate (TESSALON) 100 MG Cap Take 1 Cap by mouth 3 times a day as needed for Cough. 30 Cap 0   • cetirizine (ZYRTEC) 5 MG tablet Take 1 Tab by mouth 1 time daily as  "needed for Allergies. 30 Tab 2     No current Epic-ordered facility-administered medications on file.        Past Medical History:   Diagnosis Date   • ADHD (attention deficit hyperactivity disorder)    • Allergy    • ASTHMA    • Depression        Past Surgical History:   Procedure Laterality Date   • SHOULDER ARTHROSCOPY W/ SLAP / LABRAL REPAIR  3/13/2014    Performed by Fabián Rosenberg M.D. at SURGERY [facility]   • OTHER  2014    left shoulder dislocation put back in place   • OTHER      detached retina OU       Social History     Tobacco Use   • Smoking status: Former Smoker     Packs/day: 0.00     Last attempt to quit: 3/11/2018     Years since quittin.6   • Smokeless tobacco: Never Used   Substance Use Topics   • Alcohol use: Yes     Comment: 1 xweek   • Drug use: No     Types: Cocaine     Comment: one time        Social History     Patient does not qualify to have social determinant information on file (likely too young).   Social History Narrative   • Not on file       Family History   Family history unknown: Yes       ROS:   Gen: no fevers/chills, no changes in weight  Eyes: no changes in vision  ENT: +sore throat, no hearing loss, no bloody nose  Pulm: no sob, +cough  CV: no chest pain, no palpitations  GI: no nausea/vomiting, no diarrhea  : no dysuria  MSk: no myalgias  Skin: no rash  Neuro: no headaches, no numbness/tingling  Heme/Lymph: no easy bruising      Objective:       Exam: /82 (BP Location: Left arm, Patient Position: Sitting)   Pulse (!) 101   Temp 37.1 °C (98.8 °F) (Temporal)   Ht 1.626 m (5' 4\")   Wt 71.3 kg (157 lb 3.2 oz)   SpO2 96%  Body mass index is 26.98 kg/m².    General: Normal appearing. No distress.  HEENT: Normocephalic. Eyes conjunctiva clear lids without ptosis, pupils equal  and reactive to light accommodation, ears normal shape and contour, canals are clear bilaterally, tympanic membranes are benign, nasal mucosa inflamd, oropharynx is +erythema, edema " "or exudates.   Neck: Supple without JVD or bruit. Thyroid is not enlarged.  Pulmonary: Clear to ausculation.  Normal effort. No rales, ronchi, or wheezing.  Cardiovascular: Regular rate and rhythm without murmur. Carotid and radial pulses are intact and equal bilaterally.  Abdomen: Soft, nontender, nondistended. Normal bowel sounds. Liver and spleen are not palpable  Neurologic: Grossly nonfocal  Lymph: No cervical or supraclavicular lymph nodes are palpable  Skin: Warm and dry.  No obvious lesions.  Musculoskeletal: Normal gait. No extremity cyanosis, clubbing, or edema.  Psych: Normal mood and affect. Alert and oriented x3. Judgment and insight is normal.      Assessment & Plan:   24 y.o. male with the following -    1. Attention deficit hyperactivity disorder (ADHD), other type  Patient understands this prescription is a controlled substance which is potentially habit-forming and its use is regulated by the ROL[employer].  We also discussed the new \"black box\" warning regarding the lethal combination of opioids and benzodiazepines.  Refills are subject to terms of a controlled substance agreement and patient has an updated one on file.  Most recent UDS is appropriate.  They are aware that any refill requires an office visit.  Narcotics have may adverse effects and the risks of addiction, accidental overdose and death were emphasized.  Provided prescriptions for the next three months.  Went over the benefits and risks of this medication.  We will follow-up in 3 months    - Controlled Substance Treatment Agreement  - Pain Management Screen; Future  - methylphenidate (CONCERTA) 54 MG CR tablet; Take 1 Tab by mouth every morning for 30 days.  Dispense: 30 Tab; Refill: 0          Please note that this dictation was created using voice recognition software. I have made every reasonable attempt to correct obvious errors, but I expect that there are errors of grammar and possibly content that I did not discover before finalizing " the note.   Primary Defect Length In Cm (Final Defect Size - Required For Flaps/Grafts): 1.5

## 2020-02-03 ENCOUNTER — OFFICE VISIT (OUTPATIENT)
Dept: MEDICAL GROUP | Facility: LAB | Age: 25
End: 2020-02-03
Payer: COMMERCIAL

## 2020-02-03 VITALS
DIASTOLIC BLOOD PRESSURE: 88 MMHG | HEART RATE: 74 BPM | HEIGHT: 64 IN | WEIGHT: 162 LBS | SYSTOLIC BLOOD PRESSURE: 124 MMHG | BODY MASS INDEX: 27.66 KG/M2 | OXYGEN SATURATION: 94 % | RESPIRATION RATE: 16 BRPM | TEMPERATURE: 98.2 F

## 2020-02-03 DIAGNOSIS — F90.8 ATTENTION DEFICIT HYPERACTIVITY DISORDER (ADHD), OTHER TYPE: ICD-10-CM

## 2020-02-03 PROCEDURE — 99214 OFFICE O/P EST MOD 30 MIN: CPT | Performed by: FAMILY MEDICINE

## 2020-02-03 RX ORDER — CETIRIZINE HYDROCHLORIDE 5 MG/1
5 TABLET ORAL
Qty: 30 TAB | Refills: 2 | Status: SHIPPED | OUTPATIENT
Start: 2020-02-03 | End: 2021-03-18

## 2020-02-03 RX ORDER — CETIRIZINE HYDROCHLORIDE 5 MG/1
TABLET ORAL
COMMUNITY
Start: 2019-04-12 | End: 2020-12-16

## 2020-02-03 RX ORDER — METHYLPHENIDATE HYDROCHLORIDE 54 MG/1
54 TABLET ORAL EVERY MORNING
Qty: 30 TAB | Refills: 0 | Status: SHIPPED | OUTPATIENT
Start: 2020-02-03 | End: 2020-02-03 | Stop reason: SDUPTHER

## 2020-02-03 RX ORDER — METHYLPHENIDATE HYDROCHLORIDE 54 MG/1
TABLET, EXTENDED RELEASE ORAL
COMMUNITY
Start: 2019-07-09 | End: 2020-02-03

## 2020-02-03 RX ORDER — METHYLPHENIDATE HYDROCHLORIDE 54 MG/1
54 TABLET ORAL EVERY MORNING
Qty: 30 TAB | Refills: 0 | Status: SHIPPED | OUTPATIENT
Start: 2020-03-03 | End: 2020-02-03 | Stop reason: SDUPTHER

## 2020-02-03 RX ORDER — METHYLPHENIDATE HYDROCHLORIDE 54 MG/1
54 TABLET ORAL EVERY MORNING
Qty: 30 TAB | Refills: 0 | Status: SHIPPED | OUTPATIENT
Start: 2020-04-03 | End: 2020-05-03

## 2020-02-03 ASSESSMENT — PATIENT HEALTH QUESTIONNAIRE - PHQ9
3. TROUBLE FALLING OR STAYING ASLEEP OR SLEEPING TOO MUCH: NOT AT ALL
1. LITTLE INTEREST OR PLEASURE IN DOING THINGS: NOT AT ALL
7. TROUBLE CONCENTRATING ON THINGS, SUCH AS READING THE NEWSPAPER OR WATCHING TELEVISION: NOT AT ALL
5. POOR APPETITE OR OVEREATING: NOT AT ALL
SUM OF ALL RESPONSES TO PHQ QUESTIONS 1-9: 0
9. THOUGHTS THAT YOU WOULD BE BETTER OFF DEAD, OR OF HURTING YOURSELF: NOT AT ALL
8. MOVING OR SPEAKING SO SLOWLY THAT OTHER PEOPLE COULD HAVE NOTICED. OR THE OPPOSITE, BEING SO FIGETY OR RESTLESS THAT YOU HAVE BEEN MOVING AROUND A LOT MORE THAN USUAL: NOT AT ALL
SUM OF ALL RESPONSES TO PHQ9 QUESTIONS 1 AND 2: 0
2. FEELING DOWN, DEPRESSED, IRRITABLE, OR HOPELESS: NOT AT ALL
4. FEELING TIRED OR HAVING LITTLE ENERGY: NOT AT ALL
6. FEELING BAD ABOUT YOURSELF - OR THAT YOU ARE A FAILURE OR HAVE LET YOURSELF OR YOUR FAMILY DOWN: NOT AL ALL

## 2020-02-03 NOTE — PROGRESS NOTES
Subjective:     CC: ADHD    HPI:   Gildardo presents today with     ADHD:  This is a chronic stable issue.  Patient on Concerta for diagnosed ADHD.  He has an appointment in July with psychiatry.  He denies any palpitations, weight loss, insomnia or any side effects from his medications.  He has been stable on this dose for several years now.  He is here for refill.    Past Medical History:   Diagnosis Date   • ADHD (attention deficit hyperactivity disorder)    • Allergy    • ASTHMA    • Depression        Social History     Tobacco Use   • Smoking status: Former Smoker     Packs/day: 0.00     Last attempt to quit: 3/11/2018     Years since quittin.9   • Smokeless tobacco: Never Used   Substance Use Topics   • Alcohol use: Yes     Comment: 1 xweek   • Drug use: No     Types: Cocaine     Comment: one time        Current Outpatient Medications Ordered in Epic   Medication Sig Dispense Refill   • cetirizine (ZYRTEC) 5 MG tablet CETIRIZINE HCL 5 MG TABS     • [START ON 4/3/2020] methylphenidate (CONCERTA) 54 MG CR tablet Take 1 Tab by mouth every morning for 30 days. 30 Tab 0   • cetirizine (ZYRTEC) 5 MG tablet Take 1 Tab by mouth 1 time daily as needed for Allergies. 30 Tab 2   • Multiple Vitamins-Minerals (MULTIVITAMIN ADULT) Tab Take  by mouth.     • azithromycin (ZITHROMAX) 250 MG Tab Take two tabs po day one followed by one tab po day two through five with food 6 Tab 0   • albuterol 108 (90 Base) MCG/ACT Aero Soln inhalation aerosol Inhale 1-2 Puffs by mouth every four hours as needed for Shortness of Breath. 1 Inhaler 0   • benzonatate (TESSALON) 100 MG Cap Take 1 Cap by mouth 3 times a day as needed for Cough. 30 Cap 0     No current Epic-ordered facility-administered medications on file.        Allergies:  Patient has no known allergies.      ROS:  Gen: no fevers/chill, no changes in weight  Eyes: no changes in vision  ENT: no sore throat, no hearing loss, no bloody nose  Pulm: no sob, no cough  CV: no chest  "pain, no palpitations  GI: no nausea/vomiting, no diarrhea  : no dysuria  MSk: no myalgias  Skin: no rash  Neuro: no headaches, no numbness/tingling  Heme/Lymph: no easy bruising      Objective:       Exam:  /88 (BP Location: Left arm, Patient Position: Sitting, BP Cuff Size: Adult)   Pulse 74   Temp 36.8 °C (98.2 °F) (Temporal)   Resp 16   Ht 1.626 m (5' 4\")   Wt 73.5 kg (162 lb)   SpO2 94%   BMI 27.81 kg/m²  Body mass index is 27.81 kg/m².    Gen: Alert and oriented, No apparent distress.  Neck: Neck is supple without lymphadenopathy.  Lungs: Normal effort, CTA bilaterally, no wheezes, rhonchi, or rales  CV: Regular rate and rhythm. No murmurs, rubs, or gallops.               Ext: No clubbing, cyanosis, edema.    Assessment & Plan:     24 y.o. male with the following -     1. Attention deficit hyperactivity disorder (ADHD), other type  Patient understands this prescription is a controlled substance which is potentially habit-forming and its use is regulated by the [employer].  We also discussed the new \"black box\" warning regarding the lethal combination of opioids and benzodiazepines.  Refills are subject to terms of a controlled substance agreement and patient has an updated one on file.  Most recent UDS is appropriate.  They are aware that any refill requires an office visit.  Narcotics have may adverse effects and the risks of addiction, accidental overdose and death were emphasized.  Provided prescriptions for the next three months.    - methylphenidate (CONCERTA) 54 MG CR tablet; Take 1 Tab by mouth every morning for 30 days.  Dispense: 30 Tab; Refill: 0        Please note that this dictation was created using voice recognition software. I have made every reasonable attempt to correct obvious errors, but I expect that there are errors of grammar and possibly content that I did not discover before finalizing the note.      "

## 2020-05-06 ENCOUNTER — OFFICE VISIT (OUTPATIENT)
Dept: MEDICAL GROUP | Facility: LAB | Age: 25
End: 2020-05-06
Payer: COMMERCIAL

## 2020-05-06 VITALS
DIASTOLIC BLOOD PRESSURE: 78 MMHG | SYSTOLIC BLOOD PRESSURE: 120 MMHG | HEIGHT: 64 IN | WEIGHT: 166.4 LBS | BODY MASS INDEX: 28.41 KG/M2 | TEMPERATURE: 97.8 F | OXYGEN SATURATION: 95 % | HEART RATE: 70 BPM

## 2020-05-06 DIAGNOSIS — F90.8 ATTENTION DEFICIT HYPERACTIVITY DISORDER (ADHD), OTHER TYPE: ICD-10-CM

## 2020-05-06 PROCEDURE — 99214 OFFICE O/P EST MOD 30 MIN: CPT | Performed by: FAMILY MEDICINE

## 2020-05-06 RX ORDER — METHYLPHENIDATE HYDROCHLORIDE 54 MG/1
54 TABLET ORAL EVERY MORNING
Qty: 30 TAB | Refills: 0 | Status: SHIPPED | OUTPATIENT
Start: 2020-05-06 | End: 2020-05-06 | Stop reason: SDUPTHER

## 2020-05-06 RX ORDER — METHYLPHENIDATE HYDROCHLORIDE 54 MG/1
54 TABLET ORAL EVERY MORNING
Qty: 30 TAB | Refills: 0 | Status: SHIPPED | OUTPATIENT
Start: 2020-06-06 | End: 2020-05-06 | Stop reason: SDUPTHER

## 2020-05-06 RX ORDER — METHYLPHENIDATE HYDROCHLORIDE 54 MG/1
54 TABLET ORAL EVERY MORNING
Qty: 30 TAB | Refills: 0 | Status: SHIPPED | OUTPATIENT
Start: 2020-07-06 | End: 2020-08-05

## 2020-05-06 ASSESSMENT — FIBROSIS 4 INDEX: FIB4 SCORE: 0.49

## 2020-08-18 ENCOUNTER — OFFICE VISIT (OUTPATIENT)
Dept: MEDICAL GROUP | Facility: LAB | Age: 25
End: 2020-08-18
Payer: COMMERCIAL

## 2020-08-18 VITALS
DIASTOLIC BLOOD PRESSURE: 86 MMHG | HEIGHT: 64 IN | HEART RATE: 72 BPM | TEMPERATURE: 98 F | WEIGHT: 168.2 LBS | SYSTOLIC BLOOD PRESSURE: 122 MMHG | BODY MASS INDEX: 28.71 KG/M2 | OXYGEN SATURATION: 95 %

## 2020-08-18 DIAGNOSIS — F90.8 ATTENTION DEFICIT HYPERACTIVITY DISORDER (ADHD), OTHER TYPE: ICD-10-CM

## 2020-08-18 PROCEDURE — 99214 OFFICE O/P EST MOD 30 MIN: CPT | Performed by: FAMILY MEDICINE

## 2020-08-18 RX ORDER — METHYLPHENIDATE HYDROCHLORIDE 54 MG/1
54 TABLET ORAL EVERY MORNING
Qty: 90 TAB | Refills: 0 | Status: SHIPPED | OUTPATIENT
Start: 2020-08-18 | End: 2020-08-18 | Stop reason: SDUPTHER

## 2020-08-18 RX ORDER — METHYLPHENIDATE HYDROCHLORIDE 54 MG/1
54 TABLET ORAL EVERY MORNING
Qty: 90 TAB | Refills: 0 | Status: SHIPPED | OUTPATIENT
Start: 2020-08-18 | End: 2020-11-16

## 2020-08-18 ASSESSMENT — FIBROSIS 4 INDEX: FIB4 SCORE: 0.51

## 2020-08-18 NOTE — PROGRESS NOTES
Subjective:     CC: ADHD    HPI:   Gildardo presents today with    ADHD:  Chronic stable.  Uses Concerta daily.  Follows with psychiatry  No side effects, palpitations, weight loss, insomnia, or overall side effects  Has not started drug holidays  Stable on dose      Past Medical History:   Diagnosis Date   • ADHD (attention deficit hyperactivity disorder)    • Allergy    • ASTHMA    • Depression        Social History     Tobacco Use   • Smoking status: Former Smoker     Packs/day: 0.00     Quit date: 3/11/2018     Years since quittin.4   • Smokeless tobacco: Never Used   Substance Use Topics   • Alcohol use: Yes     Comment: 1 xweek   • Drug use: No     Types: Cocaine     Comment: one time        Current Outpatient Medications Ordered in Epic   Medication Sig Dispense Refill   • methylphenidate (CONCERTA) 54 MG CR tablet Take 1 Tab by mouth every morning for 90 days. 90 Tab 0   • cetirizine (ZYRTEC) 5 MG tablet CETIRIZINE HCL 5 MG TABS     • cetirizine (ZYRTEC) 5 MG tablet Take 1 Tab by mouth 1 time daily as needed for Allergies. 30 Tab 2   • Multiple Vitamins-Minerals (MULTIVITAMIN ADULT) Tab Take  by mouth.     • azithromycin (ZITHROMAX) 250 MG Tab Take two tabs po day one followed by one tab po day two through five with food 6 Tab 0   • albuterol 108 (90 Base) MCG/ACT Aero Soln inhalation aerosol Inhale 1-2 Puffs by mouth every four hours as needed for Shortness of Breath. 1 Inhaler 0   • benzonatate (TESSALON) 100 MG Cap Take 1 Cap by mouth 3 times a day as needed for Cough. 30 Cap 0     No current Epic-ordered facility-administered medications on file.        Allergies:  Patient has no known allergies.    Health Maintenance: Completed    ROS:  Gen: no fevers/chill, no changes in weight  Eyes: no changes in vision  ENT: no sore throat, no hearing loss, no bloody nose  Pulm: no sob, no cough  CV: no chest pain, no palpitations  GI: no nausea/vomiting, no diarrhea  : no dysuria  MSk: no myalgias  Skin: no  "rash  Neuro: no headaches, no numbness/tingling  Heme/Lymph: no easy bruising      Objective:       Exam:  /86 (BP Location: Left arm, Patient Position: Sitting)   Pulse 72   Temp 36.7 °C (98 °F) (Temporal)   Ht 1.626 m (5' 4\")   Wt 76.3 kg (168 lb 3.2 oz)   SpO2 95%   BMI 28.87 kg/m²  Body mass index is 28.87 kg/m².    Gen: Alert and oriented, No apparent distress.  Neck: Neck is supple without lymphadenopathy.  Lungs: Normal effort, CTA bilaterally, no wheezes, rhonchi, or rales  CV: Regular rate and rhythm. No murmurs, rubs, or gallops.               Ext: No clubbing, cyanosis, edema.      Assessment & Plan:     25 y.o. male with the following -     1. Attention deficit hyperactivity disorder (ADHD), other type  Patient understands this prescription is a controlled substance which is potentially habit-forming and its use is regulated by the [employer].  We also discussed the new \"black box\" warning regarding the lethal combination of opioids and benzodiazepines.  Refills are subject to terms of a controlled substance agreement and patient has an updated one on file.  Most recent UDS is appropriate.  They are aware that any refill requires an office visit.  Narcotics have may adverse effects and the risks of addiction, accidental overdose and death were emphasized.  Provided prescriptions for the next three months.  - methylphenidate (CONCERTA) 54 MG CR tablet; Take 1 Tab by mouth every morning for 90 days.  Dispense: 90 Tab; Refill: 0        Please note that this dictation was created using voice recognition software. I have made every reasonable attempt to correct obvious errors, but I expect that there are errors of grammar and possibly content that I did not discover before finalizing the note.      "

## 2020-11-19 ENCOUNTER — OFFICE VISIT (OUTPATIENT)
Dept: MEDICAL GROUP | Facility: LAB | Age: 25
End: 2020-11-19
Payer: COMMERCIAL

## 2020-11-19 VITALS
HEIGHT: 64 IN | BODY MASS INDEX: 28.17 KG/M2 | RESPIRATION RATE: 16 BRPM | SYSTOLIC BLOOD PRESSURE: 124 MMHG | HEART RATE: 79 BPM | TEMPERATURE: 97.8 F | DIASTOLIC BLOOD PRESSURE: 88 MMHG | OXYGEN SATURATION: 98 % | WEIGHT: 165 LBS

## 2020-11-19 DIAGNOSIS — Z76.0 PRESCRIPTION REFILL: ICD-10-CM

## 2020-11-19 DIAGNOSIS — F90.8 ATTENTION DEFICIT HYPERACTIVITY DISORDER (ADHD), OTHER TYPE: ICD-10-CM

## 2020-11-19 PROCEDURE — 99213 OFFICE O/P EST LOW 20 MIN: CPT | Performed by: FAMILY MEDICINE

## 2020-11-19 RX ORDER — METHYLPHENIDATE HYDROCHLORIDE 54 MG/1
54 TABLET ORAL EVERY MORNING
Qty: 30 TAB | Refills: 0 | Status: SHIPPED | OUTPATIENT
Start: 2020-11-19 | End: 2020-12-19

## 2020-11-19 RX ORDER — METHYLPHENIDATE HYDROCHLORIDE 54 MG/1
54 TABLET ORAL EVERY MORNING
COMMUNITY
End: 2020-11-19 | Stop reason: SDUPTHER

## 2020-11-19 NOTE — PROGRESS NOTES
Chief Complaint:   Chief Complaint   Patient presents with   • Medication Refill       HPI: Established patient, new to me  Gildardo Moody is a 25 y.o. male who presents for medication refills    Attention deficit hyperactivity disorder (ADHD), /Prescription refill  Long history of ADHD, patient on methylphenidate for long-term use, denies side effects from medications, no concerns at this time other than refilling his medication.  Reports good control of his symptoms on medication        Past medical history, family history, social history and medications reviewed and updated in the record.  Today  Current medications, problem list and allergies reviewed in [facility]  Health maintenance topics are reviewed and updated.    Patient Active Problem List    Diagnosis Date Noted   • Health care maintenance 2019   • Attention deficit hyperactivity disorder (ADHD) 2019   • Major depressive disorder, recurrent episode, mild (HCC) 2018     Family History   Family history unknown: Yes     Social History     Socioeconomic History   • Marital status: Single     Spouse name: Not on file   • Number of children: Not on file   • Years of education: Not on file   • Highest education level: Not on file   Occupational History   • Not on file   Social Needs   • Financial resource strain: Not on file   • Food insecurity     Worry: Not on file     Inability: Not on file   • Transportation needs     Medical: Not on file     Non-medical: Not on file   Tobacco Use   • Smoking status: Former Smoker     Packs/day: 0.00     Quit date: 3/11/2018     Years since quittin.6   • Smokeless tobacco: Never Used   Substance and Sexual Activity   • Alcohol use: Yes     Comment: 1 xweek   • Drug use: No     Types: Cocaine     Comment: one time    • Sexual activity: Yes     Partners: Female   Lifestyle   • Physical activity     Days per week: Not on file     Minutes per session: Not on file   • Stress: Not on file  "  Relationships   • Social connections     Talks on phone: Not on file     Gets together: Not on file     Attends Hoahaoism service: Not on file     Active member of club or organization: Not on file     Attends meetings of clubs or organizations: Not on file     Relationship status: Not on file   • Intimate partner violence     Fear of current or ex partner: Not on file     Emotionally abused: Not on file     Physically abused: Not on file     Forced sexual activity: Not on file   Other Topics Concern   • Not on file   Social History Narrative   • Not on file     Current Outpatient Medications   Medication Sig Dispense Refill   • methylphenidate (CONCERTA) 54 MG CR tablet Take 1 Tab by mouth every morning for 30 days. 30 Tab 0   • cetirizine (ZYRTEC) 5 MG tablet Take 1 Tab by mouth 1 time daily as needed for Allergies. 30 Tab 2   • Multiple Vitamins-Minerals (MULTIVITAMIN ADULT) Tab Take  by mouth.     • cetirizine (ZYRTEC) 5 MG tablet CETIRIZINE HCL 5 MG TABS     • azithromycin (ZITHROMAX) 250 MG Tab Take two tabs po day one followed by one tab po day two through five with food (Patient not taking: Reported on 11/19/2020) 6 Tab 0   • albuterol 108 (90 Base) MCG/ACT Aero Soln inhalation aerosol Inhale 1-2 Puffs by mouth every four hours as needed for Shortness of Breath. (Patient not taking: Reported on 11/19/2020) 1 Inhaler 0   • benzonatate (TESSALON) 100 MG Cap Take 1 Cap by mouth 3 times a day as needed for Cough. (Patient not taking: Reported on 11/19/2020) 30 Cap 0     No current facility-administered medications for this visit.            Review Of Systems  As documented in HPI above  PHYSICAL EXAMINATION:    /88 (BP Location: Left arm, Patient Position: Sitting, BP Cuff Size: Adult)   Pulse 79   Temp 36.6 °C (97.8 °F) (Temporal)   Resp 16   Ht 1.626 m (5' 4\")   Wt 74.8 kg (165 lb)   SpO2 98%   BMI 28.32 kg/m²   Gen.: Well-developed, well-nourished, no apparent distress, pleasant and cooperative " "with the examination  HEENT: Normocephalic/atraumatic,Neck: No JVD or bruits, no adenopathy  Cor: Regular rate and rhythm without murmur gallop or rub    Extremities: No cyanosis, clubbing or edema          ASSESSMENT/Plan:  1. Attention deficit hyperactivity disorder (ADHD), other type   chronic problem well-controlled on medications, 30-day supply prescribed to patient today and to follow-up with his PCP for further refills.     Patient understands this prescription is a controlled substance which is potentially habit-forming and its use is regulated by the [employer].  We also discussed the new \"black box\" warning regarding the lethal combination of opioids and benzodiazepines.  Refills are subject to terms of a controlled substance agreement and patient has an updated one on file.  Most recent UDS is appropriate.  They are aware that any refill requires an office visit.  Narcotics have may adverse effects and the risks of addiction, accidental overdose and death were emphasized.  Provided prescriptions for the next three months  PDMP reviewed today no red flags  methylphenidate (CONCERTA) 54 MG CR tablet    Controlled Substance Treatment Agreement   2. Prescription refill  methylphenidate (CONCERTA) 54 MG CR tablet    Controlled Substance Treatment Agreement     Please note that this dictation was created using voice recognition software. I have made every reasonable attempt to correct obvious errors but there may be errors of grammar and content that I may have overlooked prior to finalization of this note.       "

## 2020-12-15 ENCOUNTER — APPOINTMENT (OUTPATIENT)
Dept: MEDICAL GROUP | Facility: LAB | Age: 25
End: 2020-12-15
Payer: COMMERCIAL

## 2020-12-16 ENCOUNTER — TELEMEDICINE (OUTPATIENT)
Dept: MEDICAL GROUP | Facility: LAB | Age: 25
End: 2020-12-16
Payer: COMMERCIAL

## 2020-12-16 VITALS — HEIGHT: 64 IN | WEIGHT: 163 LBS | BODY MASS INDEX: 27.83 KG/M2

## 2020-12-16 DIAGNOSIS — F90.8 ATTENTION DEFICIT HYPERACTIVITY DISORDER (ADHD), OTHER TYPE: ICD-10-CM

## 2020-12-16 DIAGNOSIS — Z20.822 CLOSE EXPOSURE TO COVID-19 VIRUS: ICD-10-CM

## 2020-12-16 DIAGNOSIS — U07.1 COVID-19 VIRUS INFECTION: ICD-10-CM

## 2020-12-16 PROCEDURE — 99214 OFFICE O/P EST MOD 30 MIN: CPT | Mod: 95,CS,CR | Performed by: FAMILY MEDICINE

## 2020-12-16 RX ORDER — METHYLPHENIDATE HYDROCHLORIDE 54 MG/1
54 TABLET ORAL EVERY MORNING
Qty: 90 TAB | Refills: 0 | Status: SHIPPED | OUTPATIENT
Start: 2020-12-18 | End: 2021-03-18 | Stop reason: SDUPTHER

## 2020-12-16 NOTE — ASSESSMENT & PLAN NOTE
Patient has a history of ADD and he has been on Concerta 54 mg for 15-16 years.  He denies any palpitations or insomnia.  If he does not take it he has difficulty concentrating at work.

## 2020-12-16 NOTE — ASSESSMENT & PLAN NOTE
Daughter and fiance tested positive last week but he has not been tested.  Patient complains of cough, loss of sense of smell and taste, fever and body aches.

## 2020-12-16 NOTE — PATIENT INSTRUCTIONS
Infection Prevention in the Home  If you have an infection, may have been exposed to an infection, or are taking care of someone who has an infection, it is important to know how to keep the infection from spreading. Follow your health care provider's instructions and use these guidelines to help stop the spread of infection.  How infections are spread  In order for an infection to spread, the following must be present:  · A germ. This may be a virus, bacteria, fungus, or parasite.  · A place for the germ to live. This may be:  ? On or in a person, animal, plant, or food.  ? In soil or water.  ? On surfaces, such as a door handle.  · A person or animal who can develop a disease if the germ enters the body (host). The host does not have resistance to the germ.  · A way for the germ to enter the host. This may occur by:  ? Direct contact with an infected person or animal. This can happen through shaking hands or hugging. Some germs can also travel through the air and spread to others. This can happen when an infected person coughs or sneezes on or near other people.  ? Indirect contact. This occurs when the germ enters the host through contact with an infected object. Examples include:  § Eating or drinking food or water that has the germ (is contaminated).  § Touching a contaminated surface with your hands, and then touching your face, eyes, nose, or mouth.  Supplies needed:  · Soap.  · Alcohol-based hand .  · Standard cleaning products.  · Disinfectants, such as bleach.  · Reusable cleaning cloths, sponges, or paper towels.  · Disposable or reusable utility gloves.  How to prevent infection from spreading  There are several things that you can do to help prevent infection from spreading.  Take these general actions  Everyone should take the following actions to prevent the spread of infection:  · Wash your hands often with soap and water for at least 20 seconds. If soap and water are not available, use  alcohol-based hand .  · Avoid touching your face, mouth, nose, or eyes.  · Cough or sneeze into a tissue, sleeve, or elbow instead of into your hand or into the air.  ? If you cough or sneeze into a tissue, throw it away immediately and wash your hands.    Keep your bathroom clean  · Provide soap.  · Change towels and washcloths frequently.  · Change toothbrushes often and store them separately in a clean, dry place.  · Clean and disinfect all surfaces, including the toilet, floor, tub, shower, and sink.  · Do not share personal items, such as razors, toothbrushes, deodorant, brito, brushes, towels, and washcloths.  Maintain hygiene in the kitchen    · Wash your hands before and after preparing food and before you eat.  · Clean the inside of your refrigerator each week.  · Keep your refrigerator set at 40°F (4°C) or less, and set your freezer at 0°F (-18°C) or less.  · Keep work surfaces clean. Disinfect them regularly.  · Wash your dishes in hot, soapy water. Air-dry your dishes or use a .  · Do not share dishes or eating utensils.  Handle food safely  · Store food carefully.  · Refrigerate leftovers promptly in covered containers.  · Throw out stale or spoiled food.  · Thaw foods in the refrigerator or microwave, not at room temperature.  · Serve foods at the proper temperature. Do not eat raw meat. Make sure it is cooked to the appropriate temperature. Cook eggs until they are firm.  · Wash fruits and vegetables under running water.  · Use separate cutting boards, plates, and utensils for raw foods and cooked foods.  · Use a clean spoon each time you sample food while cooking.  Do laundry the right way  · Wear gloves if laundry is visibly soiled.  · Do not shake soiled laundry. Doing that may send germs into the air.  · Wash laundry in hot water.  · If you cannot wash the laundry right away, place it in a plastic bag and wash it as soon as possible.  Be careful around animals and pets  · Wash  your hands before and after touching animals.  · If you have a pet, ensure that your pet stays clean. Do not let people with weak immune systems touch bird droppings, fish tank water, or a litter box.  ? If you have a pet cage or litter box, be sure to clean it every day.  · If you are sick, stay away from animals and have someone else care for them if possible.  How to clean and disinfect objects and surfaces  Precautions  · Some disinfectants work for certain germs and not others. Read the 's instructions or read online resources to determine if the product you are using will work for the germ you are trying to remove.  · If you choose to use bleach, use it safely. Never mix it with other cleaning products, especially those that contain ammonia. This mixture can create a dangerous gas that may be deadly.  · Keep proper movement of fresh air in your home (ventilation).  · Pour used mop water down the utility sink or toilet. Do not pour this water down the kitchen sink.  Objects and surfaces    · If surfaces are visibly soiled, clean them first with soap and water before disinfecting.  · Disinfect surfaces that are frequently touched every day. This may include:  ? Counters.  ? Tables.  ? Doorknobs.  ? Sinks and faucets.  ? Electronics, such as:  § Phones.  § Remote controls.  § Keyboards.  § Computers and tablets.  Cleaning supplies  Some cleaning supplies can breed germs. Take good care of them to prevent germs from spreading. To do this:  · Soak toilet brushes, mops, and sponges in bleach and water for 5 minutes after each use, or according to 's instructions.  · Wash reusable cleaning cloths and sanitize sponges after each use.  · Throw away disposable gloves after one use.  · Replace reusable utility gloves if they are cracked or torn or if they start to peel.  Additional actions if you are sick  If you live with other people:    · Avoid close contact with those around you. Stay at least  3 ft (1 m) away from others, if possible.  · Use a separate bathroom, if possible.  · If possible, sleep in a separate bedroom or in a separate bed to prevent infecting other household members.  ? Change bedroom linens each week or whenever they are soiled.  · Have everyone in the household wash hands often with soap and water. If soap and water are not available, use alcohol-based hand .  In general:  · Stay home except to get medical care. Call ahead before visiting your health care provider.  · Ask others to get groceries and household supplies and to refill prescriptions for you.  · Avoid public areas. Try not to take public transportation.  · If you can, wear a mask if you need to go out of the house, or if you are in close contact with someone who is not sick.  · Avoid visitors until you have completely recovered, or until you have no signs and symptoms of infection.  · Avoid preparing food or providing care for others. If you must prepare food or provide care for others, wear a mask and wash your hands before and after doing these things.  Where to find more information  · Centers for Disease Control and Prevention: www.cdc.gov/nonpharmaceutical-interventions/index.html  · World Health Organization (WHO): www.who.int/infection-prevention/about/en/  · Association for Professionals in Infection Control and Epidemiology: professionals.site.apic.org/settings-of-care/non-healthcare-setting/home/  Summary  · It is important to know how to keep the infection from spreading.  · Make sure everyone in your household washes their hands often with soap and water.  · Disinfect surfaces that are frequently touched every day.  · If you are sick, stay home except to get medical care.  This information is not intended to replace advice given to you by your health care provider. Make sure you discuss any questions you have with your health care provider.  Document Released: 09/26/2009 Document Revised: 04/14/2020  Document Reviewed: 03/13/2020  Elsevier Patient Education © 2020 [employer]

## 2020-12-29 NOTE — PROGRESS NOTES
Virtual Visit: Established Patient   This visit was conducted via Zoom using secure and encrypted videoconferencing technology. The patient was in a private location in the state of [place].    The patient's identity was confirmed and verbal consent was obtained for this virtual visit.    Subjective:   CC:   Chief Complaint   Patient presents with   • Medication Refill     ADHD     Stephen is a regular patient of Dr. Hina Ewing Moncho Moody is a 25 y.o. male presenting for evaluation and management of:    Attention deficit hyperactivity disorder (ADHD)  Patient has a history of ADD and he has been on Concerta 54 mg for 15-16 years.  He denies any palpitations or insomnia.  If he does not take it he has difficulty concentrating at work.    Close exposure to COVID-19 virus  Daughter and fiance tested positive last week but he has not been tested.  Patient complains of cough, loss of sense of smell and taste, fever and body aches.          ROS   . No nausea or vomiting.  No pain with urination or hematuria    No Known Allergies    Current medicines (including changes today)  Current Outpatient Medications   Medication Sig Dispense Refill   • methylphenidate (CONCERTA) 54 MG CR tablet Take 1 Tab by mouth every morning for 90 days. 90 Tab 0   • cetirizine (ZYRTEC) 5 MG tablet Take 1 Tab by mouth 1 time daily as needed for Allergies. 30 Tab 2   • Multiple Vitamins-Minerals (MULTIVITAMIN ADULT) Tab Take  by mouth.       No current facility-administered medications for this visit.        Patient Active Problem List    Diagnosis Date Noted   • Close exposure to COVID-19 virus 12/16/2020   • Health care maintenance 03/11/2019   • Attention deficit hyperactivity disorder (ADHD) 03/11/2019   • Major depressive disorder, recurrent episode, mild (HCC) 11/07/2018       Family History   Family history unknown: Yes       He  has a past medical history of ADHD (attention deficit hyperactivity disorder), Allergy, ASTHMA, and  "Depression.  He  has a past surgical history that includes other (1994); other (1/2014); and shoulder arthroscopy w/ slap / labral repair (3/13/2014).       Objective:   Ht 1.626 m (5' 4\")   Wt 73.9 kg (163 lb)   BMI 27.98 kg/m²     Physical Exam:    Constitutional: Alert, no distress, well-groomed.  Skin: No rashes in visible areas.  Eye: Round. Conjunctiva clear, lids normal. No icterus.   ENMT: Lips pink without lesions, good dentition, moist mucous membranes. Phonation normal.  Neck: No masses, no thyromegaly. Moves freely without pain.  Respiratory: Unlabored respiratory effort, no cough or audible wheeze  Psych: Alert and oriented x3, normal affect and mood.       Assessment and Plan:   The following treatment plan was discussed:     1. Attention deficit hyperactivity disorder (ADHD), other type  This is a new problem to me.  [place]  reviewed.  Controlled substance agreement on chart.  Refill Concerta for daily use.  Follow-up with Dr. Hidalgo in 3 months  - methylphenidate (CONCERTA) 54 MG CR tablet; Take 1 Tab by mouth every morning for 90 days.  Dispense: 90 Tab; Refill: 0    2. COVID-19 virus infection  Presumed given his close contacts.  Patient does not want to be Covid tested.  He is isolating in his not scheduled to return to  work for another week.  Precautions given to seek medical care if symptoms worsen    3. Close exposure to COVID-19 virus  Presumed given his close contacts.  Patient does not want to be Covid tested.  He is isolating in his not scheduled to return to  work for another week.  Precautions given to seek medical care if symptoms worsen          Follow-up: Return in about 3 months (around 3/16/2021).           "

## 2021-03-18 ENCOUNTER — OFFICE VISIT (OUTPATIENT)
Dept: MEDICAL GROUP | Facility: PHYSICIAN GROUP | Age: 26
End: 2021-03-18
Payer: COMMERCIAL

## 2021-03-18 VITALS
HEART RATE: 82 BPM | HEIGHT: 64 IN | BODY MASS INDEX: 28.68 KG/M2 | DIASTOLIC BLOOD PRESSURE: 68 MMHG | SYSTOLIC BLOOD PRESSURE: 106 MMHG | OXYGEN SATURATION: 97 % | RESPIRATION RATE: 14 BRPM | TEMPERATURE: 98.4 F | WEIGHT: 168 LBS

## 2021-03-18 DIAGNOSIS — Z23 NEED FOR VACCINATION: ICD-10-CM

## 2021-03-18 DIAGNOSIS — Z13.29 SCREENING FOR ENDOCRINE DISORDER: ICD-10-CM

## 2021-03-18 DIAGNOSIS — Z76.89 ENCOUNTER TO ESTABLISH CARE WITH NEW DOCTOR: ICD-10-CM

## 2021-03-18 DIAGNOSIS — Z13.0 SCREENING FOR DEFICIENCY ANEMIA: ICD-10-CM

## 2021-03-18 DIAGNOSIS — F90.8 ATTENTION DEFICIT HYPERACTIVITY DISORDER (ADHD), OTHER TYPE: ICD-10-CM

## 2021-03-18 DIAGNOSIS — Z13.6 SCREENING FOR CARDIOVASCULAR CONDITION: ICD-10-CM

## 2021-03-18 PROBLEM — F33.0 MAJOR DEPRESSIVE DISORDER, RECURRENT EPISODE, MILD (HCC): Status: RESOLVED | Noted: 2018-11-07 | Resolved: 2021-03-18

## 2021-03-18 PROBLEM — Z20.822 CLOSE EXPOSURE TO COVID-19 VIRUS: Status: RESOLVED | Noted: 2020-12-16 | Resolved: 2021-03-18

## 2021-03-18 PROCEDURE — 99203 OFFICE O/P NEW LOW 30 MIN: CPT | Mod: 25 | Performed by: INTERNAL MEDICINE

## 2021-03-18 PROCEDURE — 90471 IMMUNIZATION ADMIN: CPT | Performed by: INTERNAL MEDICINE

## 2021-03-18 PROCEDURE — 90686 IIV4 VACC NO PRSV 0.5 ML IM: CPT | Performed by: INTERNAL MEDICINE

## 2021-03-18 RX ORDER — METHYLPHENIDATE HYDROCHLORIDE 54 MG/1
54 TABLET ORAL EVERY MORNING
Qty: 90 TABLET | Refills: 0 | Status: SHIPPED | OUTPATIENT
Start: 2021-03-18 | End: 2021-05-27 | Stop reason: SDUPTHER

## 2021-03-18 NOTE — PROGRESS NOTES
CC: New provider  Discuss medical condition      HPI: This is a 25 y.o. pt.  Pt's medical history is notable for:     Encounter to establish care with new doctor  Patient presents today to establish care with new primary care provider.    Attention deficit hyperactivity disorder (ADHD)  This is a chronic condition noted for over 10 years.  Patient currently taking Concerta.  Symptoms are well controlled.  Patient denies any new issues.  He was under the care of psychiatrist previously.          REVIEW OF SYSTEMS:     Constitutional:  no fever / chills   Neurologic: no headaches  Eyes: no changes in vision  ENT: no sore throat, no hearing loss  CV:  no chest pain, no palpitations  Pulmonary: no SOB, no cough          Allergies: Patient has no known allergies.    Current Outpatient Medications Ordered in Epic   Medication Sig Dispense Refill   • methylphenidate (CONCERTA) 54 MG CR tablet Take 1 tablet by mouth every morning for 90 days. 90 tablet 0   • Multiple Vitamins-Minerals (MULTIVITAMIN ADULT) Tab Take  by mouth.       No current Epic-ordered facility-administered medications on file.       Past Medical, Social, and Family history reviewed and updated in EPIC     ------------------------------------------------------------------------------     PHYSICAL EXAM:   Vitals:    03/18/21 1008   BP: 106/68   Pulse: 82   Resp: 14   Temp: 36.9 °C (98.4 °F)   SpO2: 97%      Body mass index is 28.84 kg/m².         Constitutional: no acute distress  Neck: supple, no JVD  CV: heart RRR  Resp: normal effort, no wheezing or rales.  GI: abdomen soft, no obvious mass, no tenderness  Neuro: CN 2-12 grossly intact        -----------------------------------------------------------------------------    ASSESSMENT:   1. Need for vaccination  Influenza Vaccine Quad Injection (PF)   2. Attention deficit hyperactivity disorder (ADHD), other type  methylphenidate (CONCERTA) 54 MG CR tablet    REFERRAL TO PSYCHIATRY   3. Screening for  endocrine disorder  HEMOGLOBIN A1C    ALANINE AMINO-TRANS    Basic Metabolic Panel    TSH    VITAMIN D,25 HYDROXY    MICROALBUMIN CREAT RATIO URINE   4. Screening for cardiovascular condition  Lipid Profile   5. Screening for deficiency anemia  CBC WITH DIFFERENTIAL   6. Encounter to establish care with new doctor             MEDICAL DECISION MAKING: DISCUSSION / STATUS / PLAN:    Clinically patient appears stable  Advised to continue current medication  Refer the patient to psychiatry.   lab test ordered.  Advised the patient regarding healthy diet and exercise      Return in about 6 months (around 9/18/2021).       PATIENT EDUCATION:  -If any problems should arise, patient was advised to contact our office or go to ER to be evaluated.      Please note that this dictation was created using voice recognition software. I have made every reasonable attempt to correct obvious errors, but it is possible there are errors of grammar and possibly content that I did not discover before finalizing the note.

## 2021-03-18 NOTE — ASSESSMENT & PLAN NOTE
This is a chronic condition noted for over 10 years.  Patient currently taking Concerta.  Symptoms are well controlled.  Patient denies any new issues.  He was under the care of psychiatrist previously.

## 2021-05-27 ENCOUNTER — OFFICE VISIT (OUTPATIENT)
Dept: MEDICAL GROUP | Facility: PHYSICIAN GROUP | Age: 26
End: 2021-05-27
Payer: COMMERCIAL

## 2021-05-27 VITALS
SYSTOLIC BLOOD PRESSURE: 122 MMHG | OXYGEN SATURATION: 97 % | DIASTOLIC BLOOD PRESSURE: 82 MMHG | RESPIRATION RATE: 16 BRPM | BODY MASS INDEX: 28.51 KG/M2 | WEIGHT: 167 LBS | HEART RATE: 75 BPM | HEIGHT: 64 IN | TEMPERATURE: 97.5 F

## 2021-05-27 DIAGNOSIS — R73.9 HYPERGLYCEMIA: ICD-10-CM

## 2021-05-27 DIAGNOSIS — F90.9 ATTENTION DEFICIT HYPERACTIVITY DISORDER (ADHD), UNSPECIFIED ADHD TYPE: ICD-10-CM

## 2021-05-27 DIAGNOSIS — F90.8 ATTENTION DEFICIT HYPERACTIVITY DISORDER (ADHD), OTHER TYPE: ICD-10-CM

## 2021-05-27 PROBLEM — Z00.00 HEALTH CARE MAINTENANCE: Status: RESOLVED | Noted: 2019-03-11 | Resolved: 2021-05-27

## 2021-05-27 PROBLEM — Z76.89 ENCOUNTER TO ESTABLISH CARE WITH NEW DOCTOR: Status: RESOLVED | Noted: 2021-03-18 | Resolved: 2021-05-27

## 2021-05-27 PROBLEM — J30.9 ALLERGIC RHINITIS: Status: ACTIVE | Noted: 2021-05-27

## 2021-05-27 PROCEDURE — 99214 OFFICE O/P EST MOD 30 MIN: CPT | Performed by: INTERNAL MEDICINE

## 2021-05-27 RX ORDER — METHYLPHENIDATE HYDROCHLORIDE 54 MG/1
54 TABLET ORAL EVERY MORNING
Qty: 90 TABLET | Refills: 0 | Status: SHIPPED | OUTPATIENT
Start: 2021-05-27 | End: 2021-08-25

## 2021-05-27 ASSESSMENT — PATIENT HEALTH QUESTIONNAIRE - PHQ9: CLINICAL INTERPRETATION OF PHQ2 SCORE: 0

## 2021-05-27 NOTE — ASSESSMENT & PLAN NOTE
Chronic stable condition.  The patient take Zyrtec as needed.  Patient denies fever chills shortness of breath or wheezing.

## 2021-05-27 NOTE — ASSESSMENT & PLAN NOTE
Chronic condition.  The patient presently taking Concerta.  No significant side effects reported.  Symptoms  well controlled

## 2021-05-27 NOTE — PROGRESS NOTES
CC: Follow-up attention deficit disorder, refill medication        HPI: This is a 26 y.o. pt.  Pt's medical history is notable for:     Attention deficit hyperactivity disorder (ADHD)  Chronic condition.  The patient presently taking Concerta.  No significant side effects reported.  Symptoms  well controlled  Patient present today requesting refills.    Hyperglycemia  Previous lab tests show elevated glucose 2019.  Lab tests ordered for follow-up.          REVIEW OF SYSTEMS:     Constitutional:  no fever / chills   Neurologic: no headaches  Eyes: no changes in vision  ENT: no sore throat, no hearing loss  CV:  no chest pain, no palpitations  Pulmonary: no SOB, no cough          Allergies: Patient has no known allergies.    Current Outpatient Medications Ordered in Epic   Medication Sig Dispense Refill   • Cetirizine HCl (ZYRTEC ALLERGY PO) Take  by mouth.     • methylphenidate (CONCERTA) 54 MG CR tablet Take 1 tablet by mouth every morning for 90 days. 90 tablet 0   • Multiple Vitamins-Minerals (MULTIVITAMIN ADULT) Tab Take  by mouth.       No current Epic-ordered facility-administered medications on file.       Past Medical, Social, and Family history reviewed and updated in EPIC     ------------------------------------------------------------------------------     PHYSICAL EXAM:   Vitals:    05/27/21 0834   BP: 122/82   Pulse: 75   Resp: 16   Temp: 36.4 °C (97.5 °F)   SpO2: 97%      Body mass index is 28.67 kg/m².         Constitutional: no acute distress  CV: heart RRR  Resp: normal effort, no wheezing or rales.  GI: abdomen soft, no obvious mass, no tenderness  Neuro: CN 2-12 grossly intact        -----------------------------------------------------------------------------    ASSESSMENT:   1. Attention deficit hyperactivity disorder (ADHD), unspecified ADHD type     2. Hyperglycemia       methylphenidate (CONCERTA) 54 MG CR tablet           MEDICAL DECISION MAKING: DISCUSSION / STATUS / PLAN:    Medically  patient is stable  Advised the patient continue with Concerta.  Potential side effects of the medication discussed with the patient.  Refills authorized today.  Reminded patient to get lab work done.    Health maintenance.  Recommend patient to consider getting Covid vaccine.     Return in about 3 months (around 8/27/2021).     -If any problems should arise, patient was advised to contact our office or go to ER to be evaluated.    Please note that this dictation was created using voice recognition software. I have made every reasonable attempt to correct obvious errors, but it is possible there are errors of grammar and possibly content that I did not discover before finalizing the note.

## 2021-06-08 ENCOUNTER — HOSPITAL ENCOUNTER (OUTPATIENT)
Dept: LAB | Facility: MEDICAL CENTER | Age: 26
End: 2021-06-08
Attending: INTERNAL MEDICINE
Payer: COMMERCIAL

## 2021-06-08 DIAGNOSIS — Z13.6 SCREENING FOR CARDIOVASCULAR CONDITION: ICD-10-CM

## 2021-06-08 DIAGNOSIS — Z13.0 SCREENING FOR DEFICIENCY ANEMIA: ICD-10-CM

## 2021-06-08 DIAGNOSIS — Z13.29 SCREENING FOR ENDOCRINE DISORDER: ICD-10-CM

## 2021-06-08 LAB
ALT SERPL-CCNC: 28 U/L (ref 2–50)
ANION GAP SERPL CALC-SCNC: 10 MMOL/L (ref 7–16)
BASOPHILS # BLD AUTO: 0.5 % (ref 0–1.8)
BASOPHILS # BLD: 0.03 K/UL (ref 0–0.12)
BUN SERPL-MCNC: 15 MG/DL (ref 8–22)
CALCIUM SERPL-MCNC: 9 MG/DL (ref 8.5–10.5)
CHLORIDE SERPL-SCNC: 102 MMOL/L (ref 96–112)
CHOLEST SERPL-MCNC: 204 MG/DL (ref 100–199)
CO2 SERPL-SCNC: 27 MMOL/L (ref 20–33)
CREAT SERPL-MCNC: 0.85 MG/DL (ref 0.5–1.4)
CREAT UR-MCNC: 162.65 MG/DL
EOSINOPHIL # BLD AUTO: 0.19 K/UL (ref 0–0.51)
EOSINOPHIL NFR BLD: 3.4 % (ref 0–6.9)
ERYTHROCYTE [DISTWIDTH] IN BLOOD BY AUTOMATED COUNT: 39 FL (ref 35.9–50)
EST. AVERAGE GLUCOSE BLD GHB EST-MCNC: 117 MG/DL
FASTING STATUS PATIENT QL REPORTED: NORMAL
GLUCOSE SERPL-MCNC: 104 MG/DL (ref 65–99)
HBA1C MFR BLD: 5.7 % (ref 4–5.6)
HCT VFR BLD AUTO: 50.1 % (ref 42–52)
HDLC SERPL-MCNC: 44 MG/DL
HGB BLD-MCNC: 16.6 G/DL (ref 14–18)
IMM GRANULOCYTES # BLD AUTO: 0.04 K/UL (ref 0–0.11)
IMM GRANULOCYTES NFR BLD AUTO: 0.7 % (ref 0–0.9)
LDLC SERPL CALC-MCNC: 113 MG/DL
LYMPHOCYTES # BLD AUTO: 1.85 K/UL (ref 1–4.8)
LYMPHOCYTES NFR BLD: 32.7 % (ref 22–41)
MCH RBC QN AUTO: 29.2 PG (ref 27–33)
MCHC RBC AUTO-ENTMCNC: 33.1 G/DL (ref 33.7–35.3)
MCV RBC AUTO: 88 FL (ref 81.4–97.8)
MICROALBUMIN UR-MCNC: <1.2 MG/DL
MICROALBUMIN/CREAT UR: NORMAL MG/G (ref 0–30)
MONOCYTES # BLD AUTO: 0.39 K/UL (ref 0–0.85)
MONOCYTES NFR BLD AUTO: 6.9 % (ref 0–13.4)
NEUTROPHILS # BLD AUTO: 3.15 K/UL (ref 1.82–7.42)
NEUTROPHILS NFR BLD: 55.8 % (ref 44–72)
NRBC # BLD AUTO: 0 K/UL
NRBC BLD-RTO: 0 /100 WBC
PLATELET # BLD AUTO: 236 K/UL (ref 164–446)
PMV BLD AUTO: 10.5 FL (ref 9–12.9)
POTASSIUM SERPL-SCNC: 4.3 MMOL/L (ref 3.6–5.5)
RBC # BLD AUTO: 5.69 M/UL (ref 4.7–6.1)
SODIUM SERPL-SCNC: 139 MMOL/L (ref 135–145)
TRIGL SERPL-MCNC: 235 MG/DL (ref 0–149)
TSH SERPL DL<=0.005 MIU/L-ACNC: 2.27 UIU/ML (ref 0.38–5.33)
WBC # BLD AUTO: 5.7 K/UL (ref 4.8–10.8)

## 2021-06-08 PROCEDURE — 84443 ASSAY THYROID STIM HORMONE: CPT

## 2021-06-08 PROCEDURE — 85025 COMPLETE CBC W/AUTO DIFF WBC: CPT

## 2021-06-08 PROCEDURE — 82570 ASSAY OF URINE CREATININE: CPT

## 2021-06-08 PROCEDURE — 82043 UR ALBUMIN QUANTITATIVE: CPT

## 2021-06-08 PROCEDURE — 36415 COLL VENOUS BLD VENIPUNCTURE: CPT

## 2021-06-08 PROCEDURE — 80061 LIPID PANEL: CPT

## 2021-06-08 PROCEDURE — 84460 ALANINE AMINO (ALT) (SGPT): CPT

## 2021-06-08 PROCEDURE — 83036 HEMOGLOBIN GLYCOSYLATED A1C: CPT

## 2021-06-08 PROCEDURE — 80048 BASIC METABOLIC PNL TOTAL CA: CPT

## 2021-06-08 PROCEDURE — 82306 VITAMIN D 25 HYDROXY: CPT

## 2021-06-09 LAB — 25(OH)D3 SERPL-MCNC: 22 NG/ML (ref 30–100)

## 2021-08-30 ENCOUNTER — OFFICE VISIT (OUTPATIENT)
Dept: MEDICAL GROUP | Facility: PHYSICIAN GROUP | Age: 26
End: 2021-08-30
Payer: COMMERCIAL

## 2021-08-30 VITALS
TEMPERATURE: 97.9 F | HEART RATE: 89 BPM | SYSTOLIC BLOOD PRESSURE: 116 MMHG | BODY MASS INDEX: 28 KG/M2 | OXYGEN SATURATION: 95 % | WEIGHT: 164 LBS | RESPIRATION RATE: 16 BRPM | HEIGHT: 64 IN | DIASTOLIC BLOOD PRESSURE: 88 MMHG

## 2021-08-30 DIAGNOSIS — F90.9 ATTENTION DEFICIT HYPERACTIVITY DISORDER (ADHD), UNSPECIFIED ADHD TYPE: ICD-10-CM

## 2021-08-30 DIAGNOSIS — R73.03 PREDIABETES: ICD-10-CM

## 2021-08-30 DIAGNOSIS — L30.9 ECZEMA, UNSPECIFIED TYPE: ICD-10-CM

## 2021-08-30 DIAGNOSIS — E78.5 DYSLIPIDEMIA: ICD-10-CM

## 2021-08-30 PROBLEM — R73.9 HYPERGLYCEMIA: Status: RESOLVED | Noted: 2021-05-27 | Resolved: 2021-08-30

## 2021-08-30 PROCEDURE — 99214 OFFICE O/P EST MOD 30 MIN: CPT | Performed by: INTERNAL MEDICINE

## 2021-08-30 RX ORDER — TRIAMCINOLONE ACETONIDE 1 MG/G
OINTMENT TOPICAL
Qty: 80 G | Refills: 1 | Status: SHIPPED | OUTPATIENT
Start: 2021-08-30 | End: 2022-04-15

## 2021-08-30 RX ORDER — METHYLPHENIDATE HYDROCHLORIDE 54 MG/1
54 TABLET ORAL EVERY MORNING
Qty: 30 TABLET | Refills: 0 | Status: SHIPPED | OUTPATIENT
Start: 2021-08-30 | End: 2021-10-22 | Stop reason: SDUPTHER

## 2021-08-30 RX ORDER — METHYLPHENIDATE HYDROCHLORIDE 54 MG/1
54 TABLET ORAL EVERY MORNING
COMMUNITY
End: 2021-08-30 | Stop reason: SDUPTHER

## 2021-08-30 NOTE — ASSESSMENT & PLAN NOTE
Patient currently on diet therapy.  Recommend follow-up blood test in about 3 to 4 months for follow-up

## 2021-08-30 NOTE — ASSESSMENT & PLAN NOTE
This is a new condition noticed since last few months the rash occur at different part of the body.  No itching discomfort noted.  Patient denies fever or chills.

## 2021-08-30 NOTE — PROGRESS NOTES
"CC: Prescription refills  Rash      HPI: This is a 26 y.o. pt.  Pt's medical history is notable for:     Attention deficit hyperactivity disorder (ADHD)  This is a chronic condition.  Patient is stable on Concerta.  He is requesting a refill.  No new symptoms reported.  Patient denies significant side effects.    Dyslipidemia  This is a chronic condition noted with previous lab test.  Patient currently on back therapy.    Eczematous dermatitis  This is a new condition noticed since last few months the rash occur at different part of the body.  No itching discomfort noted.  Patient denies fever or chills.    Prediabetes  Patient currently on diet therapy.  Recommend follow-up blood test in about 3 to 4 months for follow-up          REVIEW OF SYSTEMS:     Constitutional:  no fever / chills   Neurologic: no headaches  Eyes: no changes in vision  ENT: no sore throat, no hearing loss  CV:  no chest pain, no palpitations  Pulmonary: no SOB, no cough          Allergies: Patient has no known allergies.    Current Outpatient Medications Ordered in Epic   Medication Sig Dispense Refill   • triamcinolone acetonide (KENALOG) 0.1 % Ointment Apply to affected bid prn up to 14 d 80 g 1   • methylphenidate (CONCERTA) 54 MG CR tablet Take 1 Tablet by mouth every morning for 30 days. 30 Tablet 0   • Cetirizine HCl (ZYRTEC ALLERGY PO) Take  by mouth.     • Multiple Vitamins-Minerals (MULTIVITAMIN ADULT) Tab Take  by mouth.       No current Epic-ordered facility-administered medications on file.       Past Medical, Social, and Family history reviewed and updated in EPIC     ------------------------------------------------------------------------------     PHYSICAL EXAM:   Vitals:    08/30/21 0919   BP: 116/88   Pulse: 89   Resp: 16   Temp: 36.6 °C (97.9 °F)   SpO2: 95%        Vitals:    08/30/21 0919   BP: 116/88   Weight: 74.4 kg (164 lb)   Height: 1.626 m (5' 4\")         Body mass index is 28.15 kg/m².    Constitutional: no acute " distress  CV: heart RRR  Resp: normal effort, no wheezing or rales.  GI: abdomen soft, no obvious mass, no tenderness  Neuro: CN 2-12 grossly intact  Skin show dry scaly erythematous rash consistent with eczematous dermatitis      -----------------------------------------------------------------------------    ASSESSMENT:   1. Eczema, unspecified type     2. Attention deficit hyperactivity disorder (ADHD), unspecified ADHD type  methylphenidate (CONCERTA) 54 MG CR tablet   3. Prediabetes  HEMOGLOBIN A1C    Basic Metabolic Panel   4. Dyslipidemia  ALANINE AMINO-TRANS    Lipid Profile           MEDICAL DECISION MAKING: DISCUSSION / STATUS / PLAN:    Eczema.  Prescribed triamcinolone ointment twice daily as needed.    Attention deficit disorder.  Chronic stable condition.  Continue with Concerta.  Refill ordered.  No significant side effects reported by the patient.  No indication for mental health referral.    Prediabetes/hyperlipidemia.  Advised the patient regarding diet and exercise.  Recommend to repeat blood test follow-up in 3 to 4 months.     Return in about 4 months (around 12/30/2021).     -If any problems should arise, patient was advised to contact our office or go to ER to be evaluated.    Please note that this dictation was created using voice recognition software. I have made every reasonable attempt to correct obvious errors, but it is possible there are errors of grammar and possibly content that I did not discover before finalizing the note.

## 2021-08-30 NOTE — ASSESSMENT & PLAN NOTE
This is a chronic condition.  Patient is stable on Concerta.  He is requesting a refill.  No new symptoms reported.  Patient denies significant side effects.

## 2021-10-22 DIAGNOSIS — F90.9 ATTENTION DEFICIT HYPERACTIVITY DISORDER (ADHD), UNSPECIFIED ADHD TYPE: ICD-10-CM

## 2021-10-25 RX ORDER — METHYLPHENIDATE HYDROCHLORIDE 54 MG/1
54 TABLET ORAL EVERY MORNING
Qty: 30 TABLET | Refills: 0 | Status: SHIPPED | OUTPATIENT
Start: 2021-10-25 | End: 2021-12-01 | Stop reason: SDUPTHER

## 2021-11-20 ENCOUNTER — OFFICE VISIT (OUTPATIENT)
Dept: URGENT CARE | Facility: PHYSICIAN GROUP | Age: 26
End: 2021-11-20
Payer: COMMERCIAL

## 2021-11-20 VITALS
WEIGHT: 165 LBS | BODY MASS INDEX: 28.17 KG/M2 | OXYGEN SATURATION: 98 % | HEIGHT: 64 IN | HEART RATE: 82 BPM | RESPIRATION RATE: 18 BRPM | TEMPERATURE: 98.7 F | SYSTOLIC BLOOD PRESSURE: 140 MMHG | DIASTOLIC BLOOD PRESSURE: 94 MMHG

## 2021-11-20 DIAGNOSIS — J02.0 PHARYNGITIS DUE TO STREPTOCOCCUS SPECIES: ICD-10-CM

## 2021-11-20 LAB
INT CON NEG: NORMAL
INT CON POS: NORMAL
S PYO AG THROAT QL: POSITIVE

## 2021-11-20 PROCEDURE — 99214 OFFICE O/P EST MOD 30 MIN: CPT | Performed by: FAMILY MEDICINE

## 2021-11-20 PROCEDURE — 87880 STREP A ASSAY W/OPTIC: CPT | Performed by: FAMILY MEDICINE

## 2021-11-20 RX ORDER — AMOXICILLIN 500 MG/1
500 CAPSULE ORAL 2 TIMES DAILY
Qty: 20 CAPSULE | Refills: 0 | Status: SHIPPED | OUTPATIENT
Start: 2021-11-20 | End: 2021-11-30

## 2021-11-20 ASSESSMENT — ENCOUNTER SYMPTOMS
COUGH: 0
SHORTNESS OF BREATH: 0
CHILLS: 0
SORE THROAT: 1
DIZZINESS: 0
VOMITING: 0
MYALGIAS: 0
NAUSEA: 0
FEVER: 0

## 2021-11-20 NOTE — PATIENT INSTRUCTIONS
Pharyngitis    Pharyngitis is a sore throat (pharynx). This is when there is redness, pain, and swelling in your throat. Most of the time, this condition gets better on its own. In some cases, you may need medicine.  Follow these instructions at home:  · Take over-the-counter and prescription medicines only as told by your doctor.  ? If you were prescribed an antibiotic medicine, take it as told by your doctor. Do not stop taking the antibiotic even if you start to feel better.  ? Do not give children aspirin. Aspirin has been linked to Reye syndrome.  · Drink enough water and fluids to keep your pee (urine) clear or pale yellow.  · Get a lot of rest.  · Rinse your mouth (gargle) with a salt-water mixture 3-4 times a day or as needed. To make a salt-water mixture, completely dissolve ½-1 tsp of salt in 1 cup of warm water.  · If your doctor approves, you may use throat lozenges or sprays to soothe your throat.  Contact a doctor if:  · You have large, tender lumps in your neck.  · You have a rash.  · You cough up green, yellow-brown, or bloody spit.  Get help right away if:  · You have a stiff neck.  · You drool or cannot swallow liquids.  · You cannot drink or take medicines without throwing up.  · You have very bad pain that does not go away with medicine.  · You have problems breathing, and it is not from a stuffy nose.  · You have new pain and swelling in your knees, ankles, wrists, or elbows.  Summary  · Pharyngitis is a sore throat (pharynx). This is when there is redness, pain, and swelling in your throat.  · If you were prescribed an antibiotic medicine, take it as told by your doctor. Do not stop taking the antibiotic even if you start to feel better.  · Most of the time, pharyngitis gets better on its own. Sometimes, you may need medicine.  This information is not intended to replace advice given to you by your health care provider. Make sure you discuss any questions you have with your health care  provider.  Document Released: 06/05/2009 Document Revised: 11/30/2018 Document Reviewed: 01/23/2018  Elsevier Patient Education © 2020 [employer]

## 2021-11-20 NOTE — PROGRESS NOTES
Subjective:   Gildardo Moody is a 26 y.o. male who presents for Sore Throat (x 2 days, sinus drainage)        Pharyngitis   This is a new problem. The current episode started in the past 7 days (2 days). There has been no fever. Associated symptoms include congestion. Pertinent negatives include no coughing, shortness of breath or vomiting. He has tried cool liquids for the symptoms. The treatment provided no relief.     PMH:  has a past medical history of ADHD (attention deficit hyperactivity disorder), Allergy, ASTHMA, and Depression.  MEDS:   Current Outpatient Medications:   •  amoxicillin (AMOXIL) 500 MG Cap, Take 1 Capsule by mouth 2 times a day for 10 days., Disp: 20 Capsule, Rfl: 0  •  methylphenidate (CONCERTA) 54 MG CR tablet, Take 1 Tablet by mouth every morning for 30 days., Disp: 30 Tablet, Rfl: 0  •  triamcinolone acetonide (KENALOG) 0.1 % Ointment, Apply to affected bid prn up to 14 d, Disp: 80 g, Rfl: 1  •  Cetirizine HCl (ZYRTEC ALLERGY PO), Take  by mouth., Disp: , Rfl:   •  Multiple Vitamins-Minerals (MULTIVITAMIN ADULT) Tab, Take  by mouth., Disp: , Rfl:   ALLERGIES: No Known Allergies  SURGHX:   Past Surgical History:   Procedure Laterality Date   • SHOULDER ARTHROSCOPY W/ SLAP / LABRAL REPAIR  3/13/2014    Performed by Fabián Rosenberg M.D. at SURGERY [facility]   • OTHER  1/2014    left shoulder dislocation put back in place   • OTHER  1994    detached retina OU     SOCHX:  reports that he quit smoking about 3 years ago. He smoked 0.00 packs per day. He has never used smokeless tobacco. He reports current alcohol use. He reports previous drug use. Drug: Cocaine.  FH:   Family History   Family history unknown: Yes     Review of Systems   Constitutional: Negative for chills and fever.   HENT: Positive for congestion and sore throat.    Respiratory: Negative for cough and shortness of breath.    Gastrointestinal: Negative for nausea and vomiting.   Musculoskeletal: Negative for  "myalgias.   Skin: Negative for rash.   Neurological: Negative for dizziness.        Objective:   /94   Pulse 82   Temp 37.1 °C (98.7 °F)   Resp 18   Ht 1.626 m (5' 4\")   Wt 74.8 kg (165 lb)   SpO2 98%   BMI 28.32 kg/m²   Physical Exam  Vitals and nursing note reviewed.   Constitutional:       General: He is not in acute distress.     Appearance: He is well-developed.   HENT:      Head: Normocephalic and atraumatic.      Right Ear: External ear normal.      Left Ear: External ear normal.      Nose: Rhinorrhea present.      Mouth/Throat:      Mouth: Mucous membranes are moist.      Pharynx: Oropharyngeal exudate and posterior oropharyngeal erythema present.   Eyes:      Conjunctiva/sclera: Conjunctivae normal.   Cardiovascular:      Rate and Rhythm: Normal rate.   Pulmonary:      Effort: Pulmonary effort is normal. No respiratory distress.      Breath sounds: Normal breath sounds. No wheezing or rhonchi.   Abdominal:      General: There is no distension.   Musculoskeletal:         General: Normal range of motion.   Skin:     General: Skin is warm and dry.   Neurological:      General: No focal deficit present.      Mental Status: He is alert and oriented to person, place, and time. Mental status is at baseline.      Gait: Gait (gait at baseline) normal.   Psychiatric:         Judgment: Judgment normal.     Point-of-care rapid strep: Positive      Assessment/Plan:   1. Pharyngitis due to Streptococcus species  - POCT Rapid Strep A  - amoxicillin (AMOXIL) 500 MG Cap; Take 1 Capsule by mouth 2 times a day for 10 days.  Dispense: 20 Capsule; Refill: 0          Medical Decision Making/Course:  In the course of preparing for this visit with review of the pertinent past medical history, recent and past clinic visits, current medications, and performing chart, immunization, medical history and medication reconciliation, and in the further course of obtaining the current history pertinent to the clinic visit " today, performing an exam and evaluation, ordering and independently evaluating labs, tests, and/or procedures including point-of-care rapid strep testing, prescribing any recommended new medications as noted above, providing any pertinent counseling and education and recommending further coordination of care, at least  20 minutes of total time were spent during this encounter.      Discussed close monitoring, return precautions, and supportive measures of maintaining adequate fluid hydration and caloric intake, relative rest and symptom management as needed for pain and/or fever.    Differential diagnosis, natural history, supportive care, and indications for immediate follow-up discussed.     Advised the patient to follow-up with the primary care physician for recheck, reevaluation, and consideration of further management.    Please note that this dictation was created using voice recognition software. I have worked with consultants from the vendor as well as technical experts from [employer] to optimize the interface. I have made every reasonable attempt to correct obvious errors, but I expect that there are errors of grammar and possibly content that I did not discover before finalizing the note.

## 2021-12-01 DIAGNOSIS — F90.9 ATTENTION DEFICIT HYPERACTIVITY DISORDER (ADHD), UNSPECIFIED ADHD TYPE: ICD-10-CM

## 2021-12-01 RX ORDER — METHYLPHENIDATE HYDROCHLORIDE 54 MG/1
54 TABLET ORAL EVERY MORNING
Qty: 30 TABLET | Refills: 0 | Status: SHIPPED | OUTPATIENT
Start: 2021-12-01 | End: 2021-12-21 | Stop reason: SDUPTHER

## 2021-12-13 ENCOUNTER — TELEPHONE (OUTPATIENT)
Dept: MEDICAL GROUP | Facility: PHYSICIAN GROUP | Age: 26
End: 2021-12-13

## 2021-12-13 NOTE — TELEPHONE ENCOUNTER
[phone] (home)  Left VM and requested to call back    Dr. Whiteside will be out December 22- January 7th please call [phone] to reschedule

## 2021-12-21 ENCOUNTER — OFFICE VISIT (OUTPATIENT)
Dept: MEDICAL GROUP | Facility: PHYSICIAN GROUP | Age: 26
End: 2021-12-21
Payer: COMMERCIAL

## 2021-12-21 VITALS
OXYGEN SATURATION: 100 % | DIASTOLIC BLOOD PRESSURE: 82 MMHG | SYSTOLIC BLOOD PRESSURE: 122 MMHG | WEIGHT: 171.9 LBS | RESPIRATION RATE: 14 BRPM | TEMPERATURE: 97.5 F | BODY MASS INDEX: 29.35 KG/M2 | HEART RATE: 68 BPM | HEIGHT: 64 IN

## 2021-12-21 DIAGNOSIS — E55.9 VITAMIN D DEFICIENCY: ICD-10-CM

## 2021-12-21 DIAGNOSIS — E78.5 DYSLIPIDEMIA: ICD-10-CM

## 2021-12-21 DIAGNOSIS — F90.9 ATTENTION DEFICIT HYPERACTIVITY DISORDER (ADHD), UNSPECIFIED ADHD TYPE: ICD-10-CM

## 2021-12-21 DIAGNOSIS — R73.03 PREDIABETES: ICD-10-CM

## 2021-12-21 PROCEDURE — 99214 OFFICE O/P EST MOD 30 MIN: CPT | Performed by: INTERNAL MEDICINE

## 2021-12-21 RX ORDER — METHYLPHENIDATE HYDROCHLORIDE 54 MG/1
54 TABLET ORAL EVERY MORNING
Qty: 30 TABLET | Refills: 3 | Status: SHIPPED | OUTPATIENT
Start: 2021-12-21 | End: 2021-12-21

## 2021-12-21 RX ORDER — METHYLPHENIDATE HYDROCHLORIDE 54 MG/1
54 TABLET ORAL EVERY MORNING
Qty: 30 TABLET | Refills: 0 | Status: SHIPPED | OUTPATIENT
Start: 2021-12-21 | End: 2022-02-08 | Stop reason: SDUPTHER

## 2021-12-21 NOTE — ASSESSMENT & PLAN NOTE
Patient has been taking Concerta for several years.  Patient stated that this medication has been working well for the patient.  Patient reported that his focus/attention span has improved since take the medication.  No significant side effects reported.

## 2021-12-21 NOTE — PROGRESS NOTES
"PRIMARY CARE CLINIC VISIT  Chief Complaint   Patient presents with   • Lab Results   • Medication Refill     Concerta 54 mg CR         History of Present Illness     Prediabetes  This is a chronic condition.  Patient is presently on diet therapy.  Lab tests requested for follow-up.    Dyslipidemia  Patient due for lab test.    Attention deficit hyperactivity disorder (ADHD)  Patient has been taking Concerta for several years.  Patient stated that this medication has been working well for the patient.  Patient reported that his focus/attention span has improved since take the medication.  No significant side effects reported.      Current Outpatient Medications on File Prior to Visit   Medication Sig Dispense Refill   • triamcinolone acetonide (KENALOG) 0.1 % Ointment Apply to affected bid prn up to 14 d 80 g 1   • Cetirizine HCl (ZYRTEC ALLERGY PO) Take  by mouth.     • Multiple Vitamins-Minerals (MULTIVITAMIN ADULT) Tab Take  by mouth.       No current facility-administered medications on file prior to visit.        Allergies: Patient has no known allergies.    ROS  As per HPI above. All other systems reviewed and negative.      Past Medical, Social, and Family history reviewed and updated in EPIC     Objective     /82   Pulse 68   Temp 36.4 °C (97.5 °F)   Resp 14   Ht 1.626 m (5' 4\")   Wt 78 kg (171 lb 14.4 oz)   SpO2 100%    Body mass index is 29.51 kg/m².    General: alert and oriented  Cardiovascular: regular rate and rhythm  Pulmonary: lungs : no wheezing   Gastrointestinal: BS present. No obvious mass noted  Neuro the patient is awake alert in no acute distress cranial nerves II to XII grossly intact        Assessment and Plan     1. Attention deficit hyperactivity disorder (ADHD), unspecified ADHD type  Chronic stable condition.  Continue with current management.  Refill sent to the pharmacy.  - methylphenidate (CONCERTA) 54 MG CR tablet; Take 1 Tablet by mouth every morning for 30 days.  " Dispense: 30 Tablet; Refill: 0  Discussed with the patient regarding the potential side effects of this medication.  The patient voiced understanding  2. Prediabetes  Chronic stable condition.  Lab tests requested.  Diet and exercise advised.  - HEMOGLOBIN A1C; Future  - Basic Metabolic Panel; Future    3. Dyslipidemia  Recommend low-fat low-cholesterol diet.  Lab tests requested.  - Lipid Profile; Future    4. Vitamin D deficiency    - VITAMIN D,25 HYDROXY; Future            Rec pt to follow up: 3 to 4 months    -If any problems should arise, patient was advised to contact our office for followup or go to ER to be evaluated.      Healthcare maintenance     Health Maintenance Due   Topic Date Due   • IMM INFLUENZA (1) 09/01/2021       Patient declined flu shot today         Please note that this dictation was created using voice recognition software. I have made every reasonable attempt to correct obvious errors but there may be errors of grammar and content that I may have overlooked prior to finalization of this note.      Cory Whiteside MD  Internal Medicine  [facility]

## 2021-12-21 NOTE — ASSESSMENT & PLAN NOTE
This is a chronic condition.  Patient is presently on diet therapy.  Lab tests requested for follow-up.

## 2022-02-08 ENCOUNTER — PATIENT MESSAGE (OUTPATIENT)
Dept: MEDICAL GROUP | Facility: PHYSICIAN GROUP | Age: 27
End: 2022-02-08

## 2022-02-08 DIAGNOSIS — F90.9 ATTENTION DEFICIT HYPERACTIVITY DISORDER (ADHD), UNSPECIFIED ADHD TYPE: ICD-10-CM

## 2022-02-08 RX ORDER — METHYLPHENIDATE HYDROCHLORIDE 54 MG/1
54 TABLET ORAL EVERY MORNING
Qty: 30 TABLET | Refills: 0 | Status: SHIPPED | OUTPATIENT
Start: 2022-02-08 | End: 2022-03-10

## 2022-03-14 DIAGNOSIS — F90.9 ATTENTION DEFICIT HYPERACTIVITY DISORDER (ADHD), UNSPECIFIED ADHD TYPE: ICD-10-CM

## 2022-03-14 RX ORDER — METHYLPHENIDATE HYDROCHLORIDE 54 MG/1
54 TABLET ORAL EVERY MORNING
Qty: 30 TABLET | Refills: 0 | OUTPATIENT
Start: 2022-03-14 | End: 2022-04-13

## 2022-03-14 RX ORDER — METHYLPHENIDATE HYDROCHLORIDE 54 MG/1
54 TABLET ORAL EVERY MORNING
Qty: 30 TABLET | Refills: 0 | Status: SHIPPED | OUTPATIENT
Start: 2022-03-14 | End: 2022-04-11 | Stop reason: SDUPTHER

## 2022-04-11 DIAGNOSIS — F90.9 ATTENTION DEFICIT HYPERACTIVITY DISORDER (ADHD), UNSPECIFIED ADHD TYPE: ICD-10-CM

## 2022-04-11 RX ORDER — METHYLPHENIDATE HYDROCHLORIDE 54 MG/1
54 TABLET ORAL EVERY MORNING
Qty: 30 TABLET | Refills: 0 | Status: CANCELLED | OUTPATIENT
Start: 2022-04-11 | End: 2022-05-11

## 2022-04-11 RX ORDER — METHYLPHENIDATE HYDROCHLORIDE 54 MG/1
54 TABLET ORAL EVERY MORNING
Qty: 30 TABLET | Refills: 0 | Status: SHIPPED | OUTPATIENT
Start: 2022-04-11 | End: 2022-05-10 | Stop reason: SDUPTHER

## 2022-04-15 ENCOUNTER — OFFICE VISIT (OUTPATIENT)
Dept: URGENT CARE | Facility: PHYSICIAN GROUP | Age: 27
End: 2022-04-15
Payer: COMMERCIAL

## 2022-04-15 VITALS
HEIGHT: 64 IN | RESPIRATION RATE: 19 BRPM | DIASTOLIC BLOOD PRESSURE: 72 MMHG | SYSTOLIC BLOOD PRESSURE: 128 MMHG | WEIGHT: 160 LBS | TEMPERATURE: 97.8 F | BODY MASS INDEX: 27.31 KG/M2 | OXYGEN SATURATION: 96 % | HEART RATE: 91 BPM

## 2022-04-15 DIAGNOSIS — J98.8 RTI (RESPIRATORY TRACT INFECTION): ICD-10-CM

## 2022-04-15 DIAGNOSIS — J45.901 EXACERBATION OF ASTHMA, UNSPECIFIED ASTHMA SEVERITY, UNSPECIFIED WHETHER PERSISTENT: ICD-10-CM

## 2022-04-15 DIAGNOSIS — R05.8 PRODUCTIVE COUGH: ICD-10-CM

## 2022-04-15 PROCEDURE — 99214 OFFICE O/P EST MOD 30 MIN: CPT | Performed by: PHYSICIAN ASSISTANT

## 2022-04-15 RX ORDER — ALBUTEROL SULFATE 90 UG/1
2 AEROSOL, METERED RESPIRATORY (INHALATION) EVERY 6 HOURS PRN
Qty: 8.5 G | Refills: 0 | Status: SHIPPED | OUTPATIENT
Start: 2022-04-15 | End: 2022-04-26 | Stop reason: SDUPTHER

## 2022-04-15 RX ORDER — AZITHROMYCIN 250 MG/1
TABLET, FILM COATED ORAL
Qty: 6 TABLET | Refills: 0 | Status: SHIPPED | OUTPATIENT
Start: 2022-04-15 | End: 2022-04-26

## 2022-04-15 ASSESSMENT — ENCOUNTER SYMPTOMS
WHEEZING: 1
CHEST TIGHTNESS: 0
CHILLS: 0
COUGH: 1
FREQUENT THROAT CLEARING: 1
SPUTUM PRODUCTION: 1
HEMOPTYSIS: 0
PALPITATIONS: 0
FEVER: 0
SHORTNESS OF BREATH: 1

## 2022-04-15 NOTE — PROGRESS NOTES
Subjective:   Gildardo Moody is a 26 y.o. male who presents today with   Chief Complaint   Patient presents with   • Asthma     Started last Friday, cough, congestion, feels fluids in lungs, asthma attack on Friday.   • Seasonal Allergies     Allergy induced asthma       Asthma  He complains of cough, frequent throat clearing, shortness of breath, sputum production and wheezing. There is no chest tightness or hemoptysis. This is a new problem. The current episode started in the past 7 days. The problem occurs constantly. The problem has been unchanged. The cough is productive of sputum. Pertinent negatives include no chest pain or fever. His past medical history is significant for asthma.     Patient has been trying Mucinex and Zyrtec over-the-counter.  States that his symptoms have not been getting better.  Has been coughing up a lot of mucus.  Had an asthma attack last Friday when symptoms first started.  PMH:  has a past medical history of ADHD (attention deficit hyperactivity disorder), Allergy, ASTHMA, and Depression.  MEDS:   Current Outpatient Medications:   •  azithromycin (ZITHROMAX) 250 MG Tab, Take 2 tablets by mouth on day one. Take one tablet by mouth the remaining days until gone, Disp: 6 Tablet, Rfl: 0  •  albuterol 108 (90 Base) MCG/ACT Aero Soln inhalation aerosol, Inhale 2 Puffs every 6 hours as needed for Shortness of Breath., Disp: 8.5 g, Rfl: 0  •  methylphenidate (CONCERTA) 54 MG CR tablet, Take 1 Tablet by mouth every morning for 30 days., Disp: 30 Tablet, Rfl: 0  •  Cetirizine HCl (ZYRTEC ALLERGY PO), Take  by mouth., Disp: , Rfl:   •  Multiple Vitamins-Minerals (MULTIVITAMIN ADULT) Tab, Take  by mouth., Disp: , Rfl:   ALLERGIES: No Known Allergies  SURGHX:   Past Surgical History:   Procedure Laterality Date   • SHOULDER ARTHROSCOPY W/ SLAP / LABRAL REPAIR  3/13/2014    Performed by Fabián Rosenberg M.D. at [facility]   • OTHER  1/2014    left shoulder dislocation put  "back in place   • OTHER  1994    detached retina OU     SOCHX:  reports that he quit smoking about 4 years ago. He smoked 0.00 packs per day. He has never used smokeless tobacco. He reports current alcohol use. He reports previous drug use. Drug: Cocaine.  FH: Reviewed with patient, not pertinent to this visit.       Review of Systems   Constitutional: Negative for chills and fever.   Respiratory: Positive for cough, sputum production, shortness of breath and wheezing. Negative for hemoptysis.    Cardiovascular: Negative for chest pain, palpitations and leg swelling.        Objective:   /72   Pulse 91   Temp 36.6 °C (97.8 °F)   Resp 19   Ht 1.626 m (5' 4\")   Wt 72.6 kg (160 lb)   SpO2 96%   BMI 27.46 kg/m²   Physical Exam  Vitals and nursing note reviewed.   Constitutional:       General: He is not in acute distress.     Appearance: Normal appearance. He is well-developed. He is not ill-appearing or toxic-appearing.   HENT:      Head: Normocephalic and atraumatic.      Right Ear: Hearing, tympanic membrane and ear canal normal.      Left Ear: Hearing, tympanic membrane and ear canal normal.      Mouth/Throat:      Pharynx: No oropharyngeal exudate or posterior oropharyngeal erythema.   Eyes:      Conjunctiva/sclera: Conjunctivae normal.   Cardiovascular:      Rate and Rhythm: Normal rate and regular rhythm.      Heart sounds: Normal heart sounds.   Pulmonary:      Effort: Pulmonary effort is normal.      Breath sounds: No stridor. Wheezing (Mild diffuse) present. No rhonchi or rales.      Comments: Congested cough on exam  Musculoskeletal:      Right lower leg: No edema.      Left lower leg: No edema.      Comments: Normal movement in all 4 extremities   Skin:     General: Skin is warm and dry.   Neurological:      Mental Status: He is alert.      Coordination: Coordination normal.   Psychiatric:         Mood and Affect: Mood normal.       Assessment/Plan:   Assessment    1. Productive cough  - " azithromycin (ZITHROMAX) 250 MG Tab; Take 2 tablets by mouth on day one. Take one tablet by mouth the remaining days until gone  Dispense: 6 Tablet; Refill: 0    2. RTI (respiratory tract infection)  - albuterol 108 (90 Base) MCG/ACT Aero Soln inhalation aerosol; Inhale 2 Puffs every 6 hours as needed for Shortness of Breath.  Dispense: 8.5 g; Refill: 0    3. Exacerbation of asthma, unspecified asthma severity, unspecified whether persistent  - albuterol 108 (90 Base) MCG/ACT Aero Soln inhalation aerosol; Inhale 2 Puffs every 6 hours as needed for Shortness of Breath.  Dispense: 8.5 g; Refill: 0  Symptoms and presentation are consistent with productive cough and respiratory infection which is worsened with asthma exacerbation.  Given that productive cough has been persistent for the last week and has been productive of purulent mucus would suggest starting on antibiotics at this time.  Differential diagnosis, natural history, supportive care, and indications for immediate follow-up discussed.   Patient given instructions and understanding of medications and treatment.    If not improving in 3-5 days, F/U with PCP or return to  if symptoms worsen.    Patient agreeable to plan.      Please note that this dictation was created using voice recognition software. I have made every reasonable attempt to correct obvious errors, but I expect that there are errors of grammar and possibly content that I did not discover before finalizing the note.    Tony Vora PA-C

## 2022-04-15 NOTE — LETTER
April 15, 2022         Patient: Gildardo Moody   YOB: 1995   Date of Visit: 4/15/2022           To Whom it May Concern:    Gildardo Moody was seen in my clinic on 4/15/2022.  Please allow patient to take breaks with walking extended periods as needed for any shortness of breath and allow him to use his inhaler as prescribed if needed.    If you have any questions or concerns, please don't hesitate to call.        Sincerely,           Tony Vora P.A.-C.  Electronically Signed

## 2022-04-19 ENCOUNTER — HOSPITAL ENCOUNTER (OUTPATIENT)
Dept: LAB | Facility: MEDICAL CENTER | Age: 27
End: 2022-04-19
Attending: INTERNAL MEDICINE
Payer: COMMERCIAL

## 2022-04-19 DIAGNOSIS — E55.9 VITAMIN D DEFICIENCY: ICD-10-CM

## 2022-04-19 DIAGNOSIS — E78.5 DYSLIPIDEMIA: ICD-10-CM

## 2022-04-19 DIAGNOSIS — R73.03 PREDIABETES: ICD-10-CM

## 2022-04-19 LAB
ANION GAP SERPL CALC-SCNC: 14 MMOL/L (ref 7–16)
BUN SERPL-MCNC: 11 MG/DL (ref 8–22)
CALCIUM SERPL-MCNC: 9.7 MG/DL (ref 8.5–10.5)
CHLORIDE SERPL-SCNC: 104 MMOL/L (ref 96–112)
CHOLEST SERPL-MCNC: 170 MG/DL (ref 100–199)
CO2 SERPL-SCNC: 20 MMOL/L (ref 20–33)
CREAT SERPL-MCNC: 0.73 MG/DL (ref 0.5–1.4)
EST. AVERAGE GLUCOSE BLD GHB EST-MCNC: 114 MG/DL
FASTING STATUS PATIENT QL REPORTED: NORMAL
GFR SERPLBLD CREATININE-BSD FMLA CKD-EPI: 127 ML/MIN/1.73 M 2
GLUCOSE SERPL-MCNC: 105 MG/DL (ref 65–99)
HBA1C MFR BLD: 5.6 % (ref 4–5.6)
HDLC SERPL-MCNC: 42 MG/DL
LDLC SERPL CALC-MCNC: 104 MG/DL
POTASSIUM SERPL-SCNC: 4.4 MMOL/L (ref 3.6–5.5)
SODIUM SERPL-SCNC: 138 MMOL/L (ref 135–145)
TRIGL SERPL-MCNC: 119 MG/DL (ref 0–149)

## 2022-04-19 PROCEDURE — 80061 LIPID PANEL: CPT

## 2022-04-19 PROCEDURE — 82306 VITAMIN D 25 HYDROXY: CPT

## 2022-04-19 PROCEDURE — 36415 COLL VENOUS BLD VENIPUNCTURE: CPT

## 2022-04-19 PROCEDURE — 83036 HEMOGLOBIN GLYCOSYLATED A1C: CPT

## 2022-04-19 PROCEDURE — 80048 BASIC METABOLIC PNL TOTAL CA: CPT

## 2022-04-21 LAB — 25(OH)D3 SERPL-MCNC: 46 NG/ML (ref 30–100)

## 2022-04-26 ENCOUNTER — HOSPITAL ENCOUNTER (OUTPATIENT)
Facility: MEDICAL CENTER | Age: 27
End: 2022-04-26
Attending: INTERNAL MEDICINE
Payer: COMMERCIAL

## 2022-04-26 ENCOUNTER — OFFICE VISIT (OUTPATIENT)
Dept: MEDICAL GROUP | Facility: PHYSICIAN GROUP | Age: 27
End: 2022-04-26
Payer: COMMERCIAL

## 2022-04-26 ENCOUNTER — HOSPITAL ENCOUNTER (OUTPATIENT)
Dept: RADIOLOGY | Facility: MEDICAL CENTER | Age: 27
End: 2022-04-26
Attending: INTERNAL MEDICINE
Payer: COMMERCIAL

## 2022-04-26 VITALS
SYSTOLIC BLOOD PRESSURE: 134 MMHG | OXYGEN SATURATION: 88 % | WEIGHT: 161.38 LBS | RESPIRATION RATE: 16 BRPM | HEIGHT: 64 IN | DIASTOLIC BLOOD PRESSURE: 84 MMHG | BODY MASS INDEX: 27.55 KG/M2 | TEMPERATURE: 98.5 F | HEART RATE: 88 BPM

## 2022-04-26 DIAGNOSIS — J45.41 MODERATE PERSISTENT ASTHMA WITH ACUTE EXACERBATION: ICD-10-CM

## 2022-04-26 DIAGNOSIS — R05.9 COUGH: ICD-10-CM

## 2022-04-26 DIAGNOSIS — R04.2 HEMOPTYSIS: ICD-10-CM

## 2022-04-26 DIAGNOSIS — R04.2 HEMOPTYSIS: Primary | ICD-10-CM

## 2022-04-26 LAB
COVID ORDER STATUS COVID19: NORMAL
EXTERNAL QUALITY CONTROL: NORMAL
INT CON NEG: NEGATIVE
INT CON POS: POSITIVE
S PYO AG THROAT QL: NORMAL
SARS-COV+SARS-COV-2 AG RESP QL IA.RAPID: NEGATIVE
SARS-COV-2 RNA RESP QL NAA+PROBE: NOTDETECTED
SPECIMEN SOURCE: NORMAL

## 2022-04-26 PROCEDURE — U0003 INFECTIOUS AGENT DETECTION BY NUCLEIC ACID (DNA OR RNA); SEVERE ACUTE RESPIRATORY SYNDROME CORONAVIRUS 2 (SARS-COV-2) (CORONAVIRUS DISEASE [COVID-19]), AMPLIFIED PROBE TECHNIQUE, MAKING USE OF HIGH THROUGHPUT TECHNOLOGIES AS DESCRIBED BY CMS-2020-01-R: HCPCS

## 2022-04-26 PROCEDURE — 87426 SARSCOV CORONAVIRUS AG IA: CPT | Performed by: INTERNAL MEDICINE

## 2022-04-26 PROCEDURE — 94640 AIRWAY INHALATION TREATMENT: CPT | Performed by: INTERNAL MEDICINE

## 2022-04-26 PROCEDURE — 87880 STREP A ASSAY W/OPTIC: CPT | Performed by: INTERNAL MEDICINE

## 2022-04-26 PROCEDURE — 71046 X-RAY EXAM CHEST 2 VIEWS: CPT

## 2022-04-26 PROCEDURE — U0005 INFEC AGEN DETEC AMPLI PROBE: HCPCS

## 2022-04-26 PROCEDURE — 99214 OFFICE O/P EST MOD 30 MIN: CPT | Mod: 25,CS | Performed by: INTERNAL MEDICINE

## 2022-04-26 RX ORDER — IPRATROPIUM BROMIDE AND ALBUTEROL SULFATE 2.5; .5 MG/3ML; MG/3ML
3 SOLUTION RESPIRATORY (INHALATION) ONCE
Status: COMPLETED | OUTPATIENT
Start: 2022-04-26 | End: 2022-04-26

## 2022-04-26 RX ORDER — ALBUTEROL SULFATE 90 UG/1
2 AEROSOL, METERED RESPIRATORY (INHALATION) EVERY 6 HOURS PRN
Qty: 8.5 G | Refills: 0 | Status: SHIPPED | OUTPATIENT
Start: 2022-04-26 | End: 2022-05-10 | Stop reason: SDUPTHER

## 2022-04-26 RX ORDER — AMOXICILLIN AND CLAVULANATE POTASSIUM 875; 125 MG/1; MG/1
1 TABLET, FILM COATED ORAL 2 TIMES DAILY
Qty: 20 TABLET | Refills: 0 | Status: SHIPPED | OUTPATIENT
Start: 2022-04-26 | End: 2022-05-10

## 2022-04-26 RX ORDER — PREDNISONE 10 MG/1
TABLET ORAL
Qty: 20 TABLET | Refills: 0 | Status: SHIPPED | OUTPATIENT
Start: 2022-04-26 | End: 2022-05-04

## 2022-04-26 RX ADMIN — IPRATROPIUM BROMIDE AND ALBUTEROL SULFATE 3 ML: 2.5; .5 SOLUTION RESPIRATORY (INHALATION) at 09:41

## 2022-04-26 ASSESSMENT — PATIENT HEALTH QUESTIONNAIRE - PHQ9: CLINICAL INTERPRETATION OF PHQ2 SCORE: 0

## 2022-04-26 NOTE — ASSESSMENT & PLAN NOTE
New condition    Pt reported symptoms started: More than 2 weeks ago.    Current symptoms: Recurrent cough/congestion.  Patient was seen in urgent care and was given Zithromax with seem to help however the cough returns.  Patient denies fever or chills.  Noted intermittent wheezing.  Patient reported 1 episode of coughing up with blood.    The patient denies recent known COVID exposure.  He has been taking cough medication over-the-counter without improvement.    Patient has history of asthma he uses albuterol as needed.  Patient reported that within the last few days he has to use albuterol more frequently.

## 2022-04-26 NOTE — PROGRESS NOTES
"PRIMARY CARE CLINIC VISIT  Chief Complaint   Patient presents with   • Follow-Up     Cough/congestion    History of Present Illness     Cough  New condition    Pt reported symptoms started: More than 2 weeks ago.    Current symptoms: Recurrent cough/congestion.  Patient was seen in urgent care and was given Zithromax with seem to help however the cough returns.  Patient denies fever or chills.  Noted intermittent wheezing.  Patient reported 1 episode of coughing up with blood.    The patient denies recent known COVID exposure.  He has been taking cough medication over-the-counter without improvement.    Patient has history of asthma he uses albuterol as needed.  Patient reported that within the last few days he has to use albuterol more frequently.        Current Outpatient Medications on File Prior to Visit   Medication Sig Dispense Refill   • VITAMIN D PO Take  by mouth.     • albuterol 108 (90 Base) MCG/ACT Aero Soln inhalation aerosol Inhale 2 Puffs every 6 hours as needed for Shortness of Breath. 8.5 g 0   • methylphenidate (CONCERTA) 54 MG CR tablet Take 1 Tablet by mouth every morning for 30 days. 30 Tablet 0   • Cetirizine HCl (ZYRTEC ALLERGY PO) Take  by mouth.     • Multiple Vitamins-Minerals (MULTIVITAMIN ADULT) Tab Take  by mouth.       No current facility-administered medications on file prior to visit.        Allergies: Patient has no known allergies.    ROS  As per HPI above. All other systems reviewed and negative.      Past Medical, Social, and Family history reviewed and updated in EPIC     Objective     /84 (BP Location: Left arm, Patient Position: Sitting, BP Cuff Size: Adult)   Pulse 88   Temp 36.9 °C (98.5 °F) (Temporal)   Resp 16   Ht 1.626 m (5' 4\")   Wt 73.2 kg (161 lb 6 oz)   SpO2 88%    Body mass index is 27.7 kg/m².    General: alert and oriented  Cardiovascular: regular rate and rhythm  Gastrointestinal: BS present. No obvious mass noted  Nose with clear nasal drainage "   Oropharynx   mild erythema no exudates  Lungs with expiratory wheezing and reduced breath sound the bases        Assessment and Plan     1. Cough  2. Hemoptysis  3. Moderate persistent asthma with acute exacerbation  This is a 26-year-old patient with history of asthma present today with worsening cough congestion wheezing and 1 episode of hemoptysis.  Symptoms started over 2 weeks ago.  Examination consistent with acute bronchitis and asthma exacerbation.    - SARS-CoV-2, PCR (In-House): Collect NP OR nasal swab in VTM; Future  - POCT SARS-COV Antigen [facility] Manual Result  - DX-CHEST-2 VIEWS; Future  - POCT Rapid Strep A  - Hydrocod Polst-CPM Polst ER (TUSSIONEX) 10-8 MG/5ML Suspension Extended Release; Take 5 mL by mouth 3 times a day as needed for Cough or Rhinitis for up to 10 days.  Dispense: 100 mL; Refill: 0    DuoNeb breathing treatment given in the office today.  Started the patient on Augmentin.  Taper prednisone also given.  Chest x-ray requested.  Tussionex as needed for cough.  Potential side effect of medication discussed with the patient.  Advised the patient not to drive or operate any machinery after taking this medication.  Today I also referred the patient to pulmonology for further evaluation.    Recommend follow-up in 2 weeks advised the patient return sooner if symptoms worsen or any change in his condition such as temperatures more than 101 worsening cough worsening shortness of breath chest pain fever chills or any change in his condition etc.           Please note that this dictation was created using voice recognition software. I have made every reasonable attempt to correct obvious errors but there may be errors of grammar and content that I may have overlooked prior to finalization of this note.      Cory Whiteside MD  Internal Medicine  [facility]

## 2022-05-10 ENCOUNTER — OFFICE VISIT (OUTPATIENT)
Dept: MEDICAL GROUP | Facility: PHYSICIAN GROUP | Age: 27
End: 2022-05-10
Payer: COMMERCIAL

## 2022-05-10 VITALS
DIASTOLIC BLOOD PRESSURE: 84 MMHG | WEIGHT: 157.13 LBS | HEART RATE: 98 BPM | HEIGHT: 64 IN | BODY MASS INDEX: 26.82 KG/M2 | RESPIRATION RATE: 16 BRPM | TEMPERATURE: 98.6 F | SYSTOLIC BLOOD PRESSURE: 122 MMHG | OXYGEN SATURATION: 98 %

## 2022-05-10 DIAGNOSIS — R04.2 HEMOPTYSIS: ICD-10-CM

## 2022-05-10 DIAGNOSIS — Z23 NEED FOR VACCINATION: ICD-10-CM

## 2022-05-10 DIAGNOSIS — F90.9 ATTENTION DEFICIT HYPERACTIVITY DISORDER (ADHD), UNSPECIFIED ADHD TYPE: ICD-10-CM

## 2022-05-10 PROCEDURE — 90471 IMMUNIZATION ADMIN: CPT | Performed by: INTERNAL MEDICINE

## 2022-05-10 PROCEDURE — 99214 OFFICE O/P EST MOD 30 MIN: CPT | Mod: 25 | Performed by: INTERNAL MEDICINE

## 2022-05-10 PROCEDURE — 90732 PPSV23 VACC 2 YRS+ SUBQ/IM: CPT | Performed by: INTERNAL MEDICINE

## 2022-05-10 RX ORDER — METHYLPHENIDATE HYDROCHLORIDE 54 MG/1
54 TABLET ORAL EVERY MORNING
Qty: 30 TABLET | Refills: 0 | Status: SHIPPED | OUTPATIENT
Start: 2022-05-10 | End: 2022-06-13 | Stop reason: SDUPTHER

## 2022-05-10 RX ORDER — ALBUTEROL SULFATE 90 UG/1
2 AEROSOL, METERED RESPIRATORY (INHALATION) EVERY 6 HOURS PRN
Qty: 8.5 G | Refills: 6 | Status: SHIPPED | OUTPATIENT
Start: 2022-05-10 | End: 2023-12-15

## 2022-05-10 NOTE — ASSESSMENT & PLAN NOTE
"Previous chest x-ray was done which came back unremarkable.  Patient was treated with Augmentin and the patient is stated that he has noted \"98% better\".  The patient has resumed normal activities.  Patient denies fever chills shortness of breath or wheezing at this time.  The patient was referred to pulmonology on the last visit.  The patient will schedule an appointment.  No further cough or hemoptysis noted.  "

## 2022-05-10 NOTE — PROGRESS NOTES
"PRIMARY CARE CLINIC VISIT  Chief Complaint   Patient presents with   • Follow-Up     Follow-up cough    History of Present Illness     Hemoptysis  Previous chest x-ray was done which came back unremarkable.  Patient was treated with Augmentin and the patient is stated that he has noted \"98% better\".  The patient has resumed normal activities.  Patient denies fever chills shortness of breath or wheezing at this time.  The patient was referred to pulmonology on the last visit.  The patient will schedule an appointment.  No further cough or hemoptysis noted.    Attention deficit hyperactivity disorder (ADHD)  Chronic stable condition.  The patient currently taking Concerta.  Patient requests refills.      Current Outpatient Medications on File Prior to Visit   Medication Sig Dispense Refill   • VITAMIN D PO Take  by mouth.     • Cetirizine HCl (ZYRTEC ALLERGY PO) Take  by mouth.     • Multiple Vitamins-Minerals (MULTIVITAMIN ADULT) Tab Take  by mouth.       No current facility-administered medications on file prior to visit.        Allergies: Patient has no known allergies.    ROS  As per HPI above. All other systems reviewed and negative.      Past Medical, Social, and Family history reviewed and updated in EPIC     Objective     /84 (BP Location: Left arm, Patient Position: Sitting, BP Cuff Size: Adult)   Pulse 98   Temp 37 °C (98.6 °F) (Temporal)   Resp 16   Ht 1.626 m (5' 4\")   Wt 71.3 kg (157 lb 2 oz)   SpO2 98%    Body mass index is 26.97 kg/m².    General: alert and oriented  Cardiovascular: regular rate and rhythm  Pulmonary: lungs : no wheezing   Gastrointestinal: BS present. No obvious mass noted          Assessment and Plan     1. Need for vaccination  - Pneumovax Vaccine (PPSV23)    2. Attention deficit hyperactivity disorder (ADHD), unspecified ADHD type  Chronic stable condition.  Continue with Concerta.  - methylphenidate (CONCERTA) 54 MG CR tablet; Take 1 Tablet by mouth every morning for 30 " days.  Dispense: 30 Tablet; Refill: 0    3. Hemoptysis  Clinically resolved.  No further cough noted.  Patient denies shortness of breath or wheezing fever or chills.  The patient stated that he has resumed normal activity without any difficulty or problems.  Other orders  - albuterol 108 (90 Base) MCG/ACT Aero Soln inhalation aerosol; Inhale 2 Puffs every 6 hours as needed for Shortness of Breath.  Dispense: 8.5 g; Refill: 6      Recommend follow-up 4 months       Please note that this dictation was created using voice recognition software. I have made every reasonable attempt to correct obvious errors but there may be errors of grammar and content that I may have overlooked prior to finalization of this note.      Cory Whiteside MD  Internal Medicine  [facility]

## 2022-06-13 DIAGNOSIS — F90.9 ATTENTION DEFICIT HYPERACTIVITY DISORDER (ADHD), UNSPECIFIED ADHD TYPE: ICD-10-CM

## 2022-06-13 RX ORDER — METHYLPHENIDATE HYDROCHLORIDE 54 MG/1
54 TABLET ORAL EVERY MORNING
Qty: 30 TABLET | Refills: 0 | Status: SHIPPED | OUTPATIENT
Start: 2022-06-13 | End: 2022-07-18 | Stop reason: SDUPTHER

## 2022-07-09 ENCOUNTER — OFFICE VISIT (OUTPATIENT)
Dept: URGENT CARE | Facility: PHYSICIAN GROUP | Age: 27
End: 2022-07-09
Payer: COMMERCIAL

## 2022-07-09 VITALS
OXYGEN SATURATION: 97 % | DIASTOLIC BLOOD PRESSURE: 60 MMHG | RESPIRATION RATE: 16 BRPM | WEIGHT: 158 LBS | HEART RATE: 69 BPM | BODY MASS INDEX: 26.98 KG/M2 | HEIGHT: 64 IN | SYSTOLIC BLOOD PRESSURE: 102 MMHG | TEMPERATURE: 98.6 F

## 2022-07-09 DIAGNOSIS — J02.9 PHARYNGITIS, UNSPECIFIED ETIOLOGY: ICD-10-CM

## 2022-07-09 DIAGNOSIS — J02.9 SORE THROAT: ICD-10-CM

## 2022-07-09 LAB
HETEROPH AB SER QL LA: NEGATIVE
INT CON NEG: NEGATIVE
INT CON NEG: NEGATIVE
INT CON POS: POSITIVE
INT CON POS: POSITIVE
S PYO AG THROAT QL: NEGATIVE

## 2022-07-09 PROCEDURE — 86308 HETEROPHILE ANTIBODY SCREEN: CPT | Performed by: PHYSICIAN ASSISTANT

## 2022-07-09 PROCEDURE — 87880 STREP A ASSAY W/OPTIC: CPT | Performed by: PHYSICIAN ASSISTANT

## 2022-07-09 PROCEDURE — 99213 OFFICE O/P EST LOW 20 MIN: CPT | Performed by: PHYSICIAN ASSISTANT

## 2022-07-09 RX ORDER — AMOXICILLIN AND CLAVULANATE POTASSIUM 875; 125 MG/1; MG/1
1 TABLET, FILM COATED ORAL 2 TIMES DAILY
Qty: 20 TABLET | Refills: 0 | Status: SHIPPED | OUTPATIENT
Start: 2022-07-09 | End: 2022-07-19

## 2022-07-09 ASSESSMENT — ENCOUNTER SYMPTOMS
COUGH: 0
MYALGIAS: 0
TROUBLE SWALLOWING: 0
EYE REDNESS: 0
HEADACHES: 1
DIARRHEA: 0
FEVER: 0
SORE THROAT: 1
NAUSEA: 0
EYE DISCHARGE: 0
SHORTNESS OF BREATH: 0
VOMITING: 0

## 2022-07-09 NOTE — PROGRESS NOTES
Subjective     Gildardo Moody is a 27 y.o. male who presents with Pharyngitis (X 2 weks)            Pharyngitis   This is a new problem. Episode onset: x 2 weeks ago; The patient states his sore throat was intermittent at onset. The patient states sore throat became persistent last night. The problem has been gradually worsening. The pain is worse on the left side. There has been no fever. Associated symptoms include congestion, headaches and a plugged ear sensation. Pertinent negatives include no coughing, diarrhea, drooling, ear pain, shortness of breath, trouble swallowing or vomiting. He has had no exposure to strep. He has tried NSAIDs (and OTC DayQuil) for the symptoms.     The patient reports no recent sick contacts.  No known exposure to COVID-19.  No known exposure to influenza    PMH:  has a past medical history of ADHD (attention deficit hyperactivity disorder), Allergy, ASTHMA, and Depression.  MEDS:   Current Outpatient Medications:   •  methylphenidate (CONCERTA) 54 MG CR tablet, Take 1 Tablet by mouth every morning for 30 days., Disp: 30 Tablet, Rfl: 0  •  albuterol 108 (90 Base) MCG/ACT Aero Soln inhalation aerosol, Inhale 2 Puffs every 6 hours as needed for Shortness of Breath., Disp: 8.5 g, Rfl: 6  •  VITAMIN D PO, Take  by mouth., Disp: , Rfl:   •  Cetirizine HCl (ZYRTEC ALLERGY PO), Take  by mouth., Disp: , Rfl:   •  Multiple Vitamins-Minerals (MULTIVITAMIN ADULT) Tab, Take  by mouth., Disp: , Rfl:   ALLERGIES: No Known Allergies  SURGHX:   Past Surgical History:   Procedure Laterality Date   • SHOULDER ARTHROSCOPY W/ SLAP / LABRAL REPAIR  3/13/2014    Performed by Fabián Rosenberg M.D. at [facility] ORS   • OTHER  1/2014    left shoulder dislocation put back in place   • OTHER  1994    detached retina OU     SOCHX:  reports that he quit smoking about 4 years ago. He smoked 0.00 packs per day. He has never used smokeless tobacco. He reports current alcohol use. He reports previous  "drug use. Drug: Cocaine.  FH: Family history was reviewed, no pertinent findings to report    Review of Systems   Constitutional: Negative for fever.   HENT: Positive for congestion and sore throat. Negative for drooling, ear pain and trouble swallowing.    Eyes: Negative for discharge and redness.   Respiratory: Negative for cough and shortness of breath.    Cardiovascular: Negative for chest pain.   Gastrointestinal: Negative for diarrhea, nausea and vomiting.   Musculoskeletal: Negative for myalgias.   Neurological: Positive for headaches.              Objective     /60   Pulse 69   Temp 37 °C (98.6 °F) (Temporal)   Resp 16   Ht 1.626 m (5' 4\")   Wt 71.7 kg (158 lb)   SpO2 97%   BMI 27.12 kg/m²      Physical Exam  Constitutional:       General: He is not in acute distress.     Appearance: Normal appearance. He is not ill-appearing.   HENT:      Head: Normocephalic and atraumatic.      Right Ear: Tympanic membrane, ear canal and external ear normal.      Left Ear: Tympanic membrane, ear canal and external ear normal.      Nose: Nose normal.      Mouth/Throat:      Mouth: Mucous membranes are moist.      Pharynx: Oropharynx is clear. Uvula midline. Posterior oropharyngeal erythema present.      Tonsils: No tonsillar exudate.   Eyes:      Extraocular Movements: Extraocular movements intact.      Conjunctiva/sclera: Conjunctivae normal.   Cardiovascular:      Rate and Rhythm: Normal rate and regular rhythm.      Heart sounds: Normal heart sounds.   Pulmonary:      Effort: Pulmonary effort is normal. No respiratory distress.      Breath sounds: Normal breath sounds. No wheezing.   Musculoskeletal:         General: Normal range of motion.      Cervical back: Normal range of motion and neck supple.   Skin:     General: Skin is warm and dry.   Neurological:      Mental Status: He is alert and oriented to person, place, and time.               Progress:  POCT Rapid Strep: NEGATIVE     POCT Mono: NEGATIVE    "   The patient declined COVID-19 testing at this time.           Assessment & Plan        1. Sore throat  - POCT Rapid Strep A  - POCT Mononucleosis (mono)    2. Pharyngitis, unspecified etiology  - amoxicillin-clavulanate (AUGMENTIN) 875-125 MG Tab; Take 1 Tablet by mouth 2 times a day for 10 days.  Dispense: 20 Tablet; Refill: 0    The patient's presenting symptoms and physical exam findings are consistent with a sore throat.  On physical exam, the patient had erythema to the posterior pharynx without tonsil hypertrophy or exudates.  The remainder the patient's physical exam today in clinic was normal.  The patient is nontoxic and appears in no acute distress.  The patient's vital signs are stable and within normal limits.  He is afebrile today in clinic.  The patient's POCT rapid strep test in clinic was negative.  The patient's POCT mono testing was also negative.  Given the prolonged duration of the patient's current symptoms, will prescribe the patient Augmentin for presumed bacterial pharyngitis.  Advised the patient to monitor for worsening signs or symptoms.  Recommend OTC medications and supportive care for symptomatic management.  Recommend patient follow-up with PCP as needed.  Discussed return precautions with the patient, and he verbalized understanding.    Differential diagnoses, supportive care, and indications for immediate follow-up discussed with patient.   Instructed to return to clinic or nearest emergency department for any change in condition, further concerns, or worsening of symptoms.      OTC Tylenol or Motrin for fever/discomfort.  OTC Supportive Care for Sore Throat - warm salt water gargles, sore throat lozenges, warm lemon water, and/or tea.  Drink plenty of fluids  Follow-up with PCP   Return to clinic or go to the ED if symptoms worsen or fail to improve, or if patient should develop worsening/increasing sore throat, difficulty swallowing, drooling, change in voice, swollen glands,  shortness of breath, ear pain, cough, congestion, fever/chills, and/or any concerning symptoms.    Discussed plan with the patient, and he agrees to the above.    I personally reviewed prior external notes and test results pertinent to today's visit.  I have independently reviewed and interpreted all diagnostics ordered during this urgent care visit.       Please note that this dictation was created using voice recognition software. I have made every reasonable attempt to correct obvious errors, but I expect that there may be errors of grammar and possibly content that I did not discover before finalizing the note.     This note was electronically signed by Radha Harrell PA-C

## 2022-07-09 NOTE — LETTER
July 9, 2022         Patient: Gildardo Moody   YOB: 1995   Date of Visit: 7/9/2022           To Whom it May Concern:    Gildardo Moody was seen in my clinic on 7/9/2022. Please excuse him from work today, 7/9/2022. He may return to work on 7/13 as scheduled.    If you have any questions or concerns, please don't hesitate to call.        Sincerely,           Radha Harrell P.A.-C.  Electronically Signed

## 2022-07-18 DIAGNOSIS — F90.9 ATTENTION DEFICIT HYPERACTIVITY DISORDER (ADHD), UNSPECIFIED ADHD TYPE: ICD-10-CM

## 2022-07-18 RX ORDER — METHYLPHENIDATE HYDROCHLORIDE 54 MG/1
54 TABLET ORAL EVERY MORNING
Qty: 30 TABLET | Refills: 0 | Status: SHIPPED | OUTPATIENT
Start: 2022-07-18 | End: 2022-08-22 | Stop reason: SDUPTHER

## 2022-07-23 ENCOUNTER — HOSPITAL ENCOUNTER (OUTPATIENT)
Facility: MEDICAL CENTER | Age: 27
End: 2022-07-23
Attending: INTERNAL MEDICINE
Payer: COMMERCIAL

## 2022-07-23 ENCOUNTER — HOSPITAL ENCOUNTER (EMERGENCY)
Facility: MEDICAL CENTER | Age: 27
End: 2022-07-23
Attending: EMERGENCY MEDICINE
Payer: COMMERCIAL

## 2022-07-23 ENCOUNTER — OFFICE VISIT (OUTPATIENT)
Dept: URGENT CARE | Facility: CLINIC | Age: 27
End: 2022-07-23
Payer: COMMERCIAL

## 2022-07-23 VITALS
OXYGEN SATURATION: 96 % | BODY MASS INDEX: 26.98 KG/M2 | SYSTOLIC BLOOD PRESSURE: 118 MMHG | HEART RATE: 100 BPM | DIASTOLIC BLOOD PRESSURE: 86 MMHG | WEIGHT: 158 LBS | RESPIRATION RATE: 16 BRPM | HEIGHT: 64 IN | TEMPERATURE: 98.2 F

## 2022-07-23 VITALS
OXYGEN SATURATION: 95 % | SYSTOLIC BLOOD PRESSURE: 138 MMHG | WEIGHT: 155 LBS | RESPIRATION RATE: 20 BRPM | TEMPERATURE: 98.3 F | DIASTOLIC BLOOD PRESSURE: 81 MMHG | BODY MASS INDEX: 26.46 KG/M2 | HEART RATE: 92 BPM | HEIGHT: 64 IN

## 2022-07-23 DIAGNOSIS — Z20.822 SUSPECTED COVID-19 VIRUS INFECTION: ICD-10-CM

## 2022-07-23 DIAGNOSIS — J45.41 MODERATE PERSISTENT ASTHMA WITH ACUTE EXACERBATION: ICD-10-CM

## 2022-07-23 DIAGNOSIS — J02.9 PHARYNGITIS, UNSPECIFIED ETIOLOGY: ICD-10-CM

## 2022-07-23 DIAGNOSIS — J02.9 SORE THROAT: ICD-10-CM

## 2022-07-23 DIAGNOSIS — R06.00 DYSPNEA, UNSPECIFIED TYPE: ICD-10-CM

## 2022-07-23 DIAGNOSIS — R06.2 WHEEZING: ICD-10-CM

## 2022-07-23 LAB
EKG IMPRESSION: NORMAL
INT CON NEG: NORMAL
INT CON POS: NORMAL
S PYO AG THROAT QL: NEGATIVE
S PYO DNA SPEC NAA+PROBE: NOT DETECTED

## 2022-07-23 PROCEDURE — 87880 STREP A ASSAY W/OPTIC: CPT | Performed by: INTERNAL MEDICINE

## 2022-07-23 PROCEDURE — 87651 STREP A DNA AMP PROBE: CPT

## 2022-07-23 PROCEDURE — 99214 OFFICE O/P EST MOD 30 MIN: CPT | Mod: 25,CS | Performed by: INTERNAL MEDICINE

## 2022-07-23 PROCEDURE — 93005 ELECTROCARDIOGRAM TRACING: CPT

## 2022-07-23 PROCEDURE — 93005 ELECTROCARDIOGRAM TRACING: CPT | Performed by: EMERGENCY MEDICINE

## 2022-07-23 PROCEDURE — 99283 EMERGENCY DEPT VISIT LOW MDM: CPT

## 2022-07-23 PROCEDURE — U0003 INFECTIOUS AGENT DETECTION BY NUCLEIC ACID (DNA OR RNA); SEVERE ACUTE RESPIRATORY SYNDROME CORONAVIRUS 2 (SARS-COV-2) (CORONAVIRUS DISEASE [COVID-19]), AMPLIFIED PROBE TECHNIQUE, MAKING USE OF HIGH THROUGHPUT TECHNOLOGIES AS DESCRIBED BY CMS-2020-01-R: HCPCS

## 2022-07-23 PROCEDURE — 94640 AIRWAY INHALATION TREATMENT: CPT | Mod: 76 | Performed by: INTERNAL MEDICINE

## 2022-07-23 RX ORDER — PREDNISONE 20 MG/1
40 TABLET ORAL DAILY
Qty: 8 TABLET | Refills: 0 | Status: SHIPPED | OUTPATIENT
Start: 2022-07-23 | End: 2022-07-27

## 2022-07-23 RX ORDER — IPRATROPIUM BROMIDE AND ALBUTEROL SULFATE 2.5; .5 MG/3ML; MG/3ML
3 SOLUTION RESPIRATORY (INHALATION) ONCE
Status: COMPLETED | OUTPATIENT
Start: 2022-07-23 | End: 2022-07-23

## 2022-07-23 RX ORDER — DEXAMETHASONE SODIUM PHOSPHATE 4 MG/ML
4 INJECTION, SOLUTION INTRA-ARTICULAR; INTRALESIONAL; INTRAMUSCULAR; INTRAVENOUS; SOFT TISSUE ONCE
Status: COMPLETED | OUTPATIENT
Start: 2022-07-23 | End: 2022-07-23

## 2022-07-23 RX ORDER — AMOXICILLIN 500 MG/1
500 CAPSULE ORAL 2 TIMES DAILY
Qty: 20 CAPSULE | Refills: 0 | Status: SHIPPED | OUTPATIENT
Start: 2022-07-23 | End: 2022-08-02

## 2022-07-23 RX ADMIN — DEXAMETHASONE SODIUM PHOSPHATE 4 MG: 4 INJECTION, SOLUTION INTRA-ARTICULAR; INTRALESIONAL; INTRAMUSCULAR; INTRAVENOUS; SOFT TISSUE at 18:19

## 2022-07-23 RX ADMIN — IPRATROPIUM BROMIDE AND ALBUTEROL SULFATE 3 ML: 2.5; .5 SOLUTION RESPIRATORY (INHALATION) at 18:20

## 2022-07-23 RX ADMIN — IPRATROPIUM BROMIDE AND ALBUTEROL SULFATE 3 ML: 2.5; .5 SOLUTION RESPIRATORY (INHALATION) at 18:37

## 2022-07-23 ASSESSMENT — ENCOUNTER SYMPTOMS
WHEEZING: 1
COUGH: 0
HEMOPTYSIS: 0
SHORTNESS OF BREATH: 1
SPUTUM PRODUCTION: 0

## 2022-07-24 DIAGNOSIS — Z20.822 SUSPECTED COVID-19 VIRUS INFECTION: ICD-10-CM

## 2022-07-24 LAB — COVID ORDER STATUS COVID19: NORMAL

## 2022-07-24 NOTE — ED TRIAGE NOTES
Chief Complaint   Patient presents with   • Shortness of Breath     Pt states he woke up feeling short of breath, pt has hx of asthma took his albuterol but it did not help, pt went to urgent care and received two different breathing treatments and a steroid pill but still feels short of breath, currently room air sat 97%     Pt ambulatory to triage for above complaint.     Pt is alert/oriented and follows commands. Pt speaking in full sentences and responds appropriately to questions. No acute distress noted in triage and respirations are even and unlabored.     Pt placed in lobby and educated on triage process. Pt encouraged to alert staff for any changes in condition.

## 2022-07-24 NOTE — ED NOTES
Pt called regarding negative strep test result. Pt advised to take prescribed steroid and not the antibiotic for negative strep test result as per ERP's instructions. Pt verbalized understanding.

## 2022-07-24 NOTE — ED NOTES
Pt resting quietly in gurney. Pt still c/o SOB but stated improving at this time. Breathing is non-labored with saturation at >95% in room air. Noted equal rise and fall of chest wall. Pt is in no apparent distress. VS stable in the monitor. Call light within reach and family is at bedside.

## 2022-07-24 NOTE — DISCHARGE INSTRUCTIONS
Please  your antibiotics only if we tell you do have a strep infection.  Otherwise just  the steroid medication.

## 2022-07-24 NOTE — ED PROVIDER NOTES
ER Provider Note     Scribed for Caleb Cary M.D. by Kandace Mike. 2022, 9:13 PM.    Primary Care Provider: Cory Whiteside M.D.  Means of Arrival: Walk-in   History obtained from: Patient  History limited by: None     CHIEF COMPLAINT  Chief Complaint   Patient presents with    Shortness of Breath     Pt states he woke up feeling short of breath, pt has hx of asthma took his albuterol but it did not help, pt went to urgent care and received two different breathing treatments and a steroid pill but still feels short of breath, currently room air sat 97%       HPI  Gildardo Moody is a 27 y.o. male who presents to the Emergency Department for moderate shortness of breath onset this morning when he woke up. He reports a history of asthma when he was a child. Patient states he did two breathing treatments and was given Decadron 4mg at urgent care just prior to arrival with minimal alleviation, which is why he was sent here. He notes that 3 weeks ago he was diagnosed with a sinus infection and started on Amoxicillin. As he started to finish the course one week ago, his throat started feeling sore. He's been taking Ibuprofen for pain with no alleviation.     REVIEW OF SYSTEMS  See HPI for further details. All other systems are negative.     PAST MEDICAL HISTORY   has a past medical history of ADHD (attention deficit hyperactivity disorder), Allergy, ASTHMA, and Depression.    SURGICAL HISTORY   has a past surgical history that includes other (); other (2014); and shoulder arthroscopy w/ slap / labral repair (3/13/2014).    SOCIAL HISTORY  Social History     Tobacco Use    Smoking status: Former Smoker     Packs/day: 0.00     Quit date: 3/11/2018     Years since quittin.3    Smokeless tobacco: Never Used   Vaping Use    Vaping Use: Never used   Substance Use Topics    Alcohol use: Yes     Comment: 1 xweek    Drug use: Not Currently     Types: Cocaine      Social History     Substance and  "Sexual Activity   Drug Use Not Currently    Types: Cocaine       FAMILY HISTORY  Family History   Family history unknown: Yes       CURRENT MEDICATIONS  Home Medications       Reviewed by Carlin Allan R.N. (Registered Nurse) on 07/23/22 at 1935  Med List Status: Not Addressed     Medication Last Dose Status   albuterol 108 (90 Base) MCG/ACT Aero Soln inhalation aerosol  Active   Cetirizine HCl (ZYRTEC ALLERGY PO)  Active   methylphenidate (CONCERTA) 54 MG CR tablet  Active   Multiple Vitamins-Minerals (MULTIVITAMIN ADULT) Tab  Active   VITAMIN D PO  Active                    ALLERGIES  No Known Allergies    PHYSICAL EXAM  VITAL SIGNS: /81   Pulse 81   Temp 36.3 °C (97.3 °F) (Temporal)   Resp 19   Ht 1.626 m (5' 4\")   Wt 70.3 kg (155 lb)   SpO2 97%   BMI 26.61 kg/m²      Constitutional: Alert in no apparent distress.  HENT: No signs of trauma, Bilateral external ears normal, Nose normal.  Mild redness in the posterior oropharynx.  Eyes: Pupils are equal and reactive, Conjunctiva normal, Non-icteric.   Neck: Normal range of motion, No tenderness, Supple, No stridor.   Lymphatic: No lymphadenopathy noted.   Cardiovascular: Regular rate and rhythm, no palpable thrill  Thorax & Lungs: scattered minimal wheezes throughout,  No respiratory distress, No chest tenderness.   Abdomen: Bowel sounds normal, Soft, No tenderness, No masses, No pulsatile masses. No peritoneal signs.  Skin: Warm, Dry, No erythema, No rash.   Back: No bony tenderness, No CVA tenderness.   Extremities: Intact distal pulses, No edema, No tenderness, No cyanosis.  Musculoskeletal: Good range of motion in all major joints. No tenderness to palpation or major deformities noted.   Neurologic: Alert , Normal motor function, Normal sensory function, No focal deficits noted.   Psychiatric: Affect normal, Judgment normal, Mood normal.     DIAGNOSTIC STUDIES / PROCEDURES    EKG Interpretation:  Interpreted by me    12 Lead EKG interpreted by me " to show:  Normal sinus rhythm  Rate 84  Axis: Normal  Intervals: Normal  Normal T waves  Normal ST segments  My impression of this EKG: Does not indicate ischemia or arrhythmia at this time.     LABS  Labs Reviewed   GROUP A STREP BY PCR     All labs reviewed by me.    COURSE & MEDICAL DECISION MAKING  Pertinent Labs & Imaging studies reviewed. (See chart for details)    This is a 27 y.o. male that presents with continued mild amount of wheezing.  At this time the patient is of a sore throat.  We will get a strep swab.  We will also give him a course of antibiotics for home.  We will call him if the strep swab is positive.  He is satting well and not in respiratory distress at this time therefore does not need further treatments with breathing medications here in emergency department.  There is no evidence of deep space infection at this time.  There is no crowding in the oropharynx whatsoever..     9:13 PM - Patient seen and examined at bedside. Patient will be given a strep test and we will call him if it comes back positive. If it is positive, he will be treated with antibiotics.      The patient will return for new or worsening symptoms and is stable at the time of discharge.    The patient is referred to a primary physician for blood pressure management, diabetic screening, and for all other preventative health concerns.    DISPOSITION:  Patient will be discharged home in stable condition.    FOLLOW UP:  No follow-up provider specified.    OUTPATIENT MEDICATIONS:  Discharge Medication List as of 7/23/2022 10:01 PM        START taking these medications    Details   predniSONE (DELTASONE) 20 MG Tab Take 2 Tablets by mouth every day for 4 days., Disp-8 Tablet, R-0, Normal      amoxicillin (AMOXIL) 500 MG Cap Take 1 Capsule by mouth 2 times a day for 10 days., Disp-20 Capsule, R-0, Normal             FINAL IMPRESSION  1. Wheezing    2. Dyspnea, unspecified type    3. Pharyngitis, unspecified etiology          I,  Kandace Mike (Scribe), am scribing for, and in the presence of, Caleb Cary M.D..    Electronically signed by: Kandace Mike (Yaquelinibliz), 7/23/2022    ICaleb M.D. personally performed the services described in this documentation, as scribed by Kandace Mike in my presence, and it is both accurate and complete.     The note accurately reflects work and decisions made by me.  Caleb Cary M.D.  7/23/2022  10:17 PM

## 2022-07-24 NOTE — ED NOTES
Pt cleared for discharge by ERP.    Pt discharged in stable condition. Discharge instructions given and explained to pt and verbalized understanding. Pt being discharged with family. Pt ambulated out of the ER with a steady and stable gait.

## 2022-07-24 NOTE — PROGRESS NOTES
Chief Complaint:      HPI:      Gildardo Moody is a 27 y.o. male with asthma complaining of shortness of breath and wheezing since week of this afternoon prior to going to work.  He was seen in the urgent care 2 weeks ago for sore throat and tested negative for strep and mono but COVID was not tested he is not fully vaccinated for COVID-19.  He also reports persistent strep throat           ROS:   Review of Systems   Respiratory: Positive for shortness of breath and wheezing. Negative for cough, hemoptysis and sputum production.         Past Medical History:  He   Patient Active Problem List    Diagnosis Date Noted   • Cough 04/26/2022   • Hemoptysis 04/26/2022   • Moderate persistent asthma with acute exacerbation 04/26/2022   • Eczematous dermatitis 08/30/2021   • Prediabetes 08/30/2021   • Dyslipidemia 08/30/2021   • Allergic rhinitis 05/27/2021   • Attention deficit hyperactivity disorder (ADHD) 03/11/2019     Past Surgical History:  He   Past Surgical History:   Procedure Laterality Date   • SHOULDER ARTHROSCOPY W/ SLAP / LABRAL REPAIR  3/13/2014    Performed by Fabián Rosenberg M.D. at [facility]   • OTHER  1/2014    left shoulder dislocation put back in place   • OTHER  1994    detached retina OU      Allergies:  He Patient has no known allergies.   Current Medications:  He   Current Outpatient Medications:   •  methylphenidate (CONCERTA) 54 MG CR tablet, Take 1 Tablet by mouth every morning for 30 days., Disp: 30 Tablet, Rfl: 0  •  albuterol 108 (90 Base) MCG/ACT Aero Soln inhalation aerosol, Inhale 2 Puffs every 6 hours as needed for Shortness of Breath., Disp: 8.5 g, Rfl: 6  •  VITAMIN D PO, Take  by mouth., Disp: , Rfl:   •  Cetirizine HCl (ZYRTEC ALLERGY PO), Take  by mouth., Disp: , Rfl:   •  Multiple Vitamins-Minerals (MULTIVITAMIN ADULT) Tab, Take  by mouth., Disp: , Rfl:     Current Facility-Administered Medications:   •  dexamethasone (DECADRON) injection 4 mg, 4 mg, Oral, Once,  "Mitchell Sullivan M.D.  •  ipratropium-albuterol (DUONEB) nebulizer solution, 3 mL, Nebulization, Once, Mitchell Sullivan M.D.  Social History:  He   Social History     Socioeconomic History   • Marital status: Single     Spouse name: Not on file   • Number of children: Not on file   • Years of education: Not on file   • Highest education level: Not on file   Occupational History   • Not on file   Tobacco Use   • Smoking status: Former Smoker     Packs/day: 0.00     Quit date: 3/11/2018     Years since quittin.3   • Smokeless tobacco: Never Used   Vaping Use   • Vaping Use: Never used   Substance and Sexual Activity   • Alcohol use: Yes     Comment: 1 xweek   • Drug use: Not Currently     Types: Cocaine     Comment: one time    • Sexual activity: Yes     Partners: Female   Other Topics Concern   • Not on file   Social History Narrative   • Not on file     Social Determinants of Health     Financial Resource Strain: Not on file   Food Insecurity: Not on file   Transportation Needs: Not on file   Physical Activity: Not on file   Stress: Not on file   Social Connections: Not on file   Intimate Partner Violence: Not on file   Housing Stability: Not on file      Family History:   His   Family History   Family history unknown: Yes        PHYSICAL EXAM:    Vital signs: /86   Pulse 100   Temp 36.8 °C (98.2 °F) (Temporal)   Resp 16   Ht 1.626 m (5' 4\")   Wt 71.7 kg (158 lb)   SpO2 96%   BMI 27.12 kg/m²   Physical Exam  Constitutional:       Appearance: He is normal weight.   HENT:      Head: Normocephalic and atraumatic.      Mouth/Throat:      Mouth: Mucous membranes are moist.      Pharynx: Oropharynx is clear.   Eyes:      Conjunctiva/sclera: Conjunctivae normal.      Pupils: Pupils are equal, round, and reactive to light.   Cardiovascular:      Rate and Rhythm: Normal rate and regular rhythm.      Pulses: Normal pulses.      Heart sounds: Normal heart sounds.   Pulmonary:      Breath sounds: Wheezing " present.   Musculoskeletal:      Cervical back: Normal range of motion and neck supple.   Neurological:      Mental Status: He is alert.         Labs:  Lab Results   Component Value Date/Time    HBA1C 5.6 04/19/2022 11:55 AM      Lab Results   Component Value Date/Time    CHOLSTRLTOT 170 04/19/2022 1155    TRIGLYCERIDE 119 04/19/2022 1155    HDL 42 04/19/2022 1155     (H) 04/19/2022 1155     Lab Results   Component Value Date/Time    MALBCRT see below 06/08/2021 10:14 AM    MICROALBUR <1.2 06/08/2021 10:14 AM      Lab Results   Component Value Date/Time    CREATININE 0.73 04/19/2022 11:55 AM           ASSESSMENT/PLAN:   1. Moderate persistent asthma with acute exacerbation  Patient was given DuoNeb therapy x2  And Decadron p.o. 4 mg    Despite these treatments he continued to have persistent severe wheezing and still had labored breathing  As such she was advised to go to the emergency room    - dexamethasone (DECADRON) injection 4 mg  - ipratropium-albuterol (DUONEB) nebulizer solution    2. Sore throat  Rapid strep was negative    3. Suspected COVID-19 virus infection  Rapid COVID test was negative      Return if symptoms worsen or fail to improve.      This patient during there office visit was started on new medication.  Side effects of new medications were discussed with the patient today in the office. The patient was supplied paperwork on this new medication.    Red flags discussed and when to RTC or seek care in the ER  Supportive care, differential diagnoses, and indications for immediate follow-up discussed with patient.    Pathogenesis of diagnosis discussed including typical length and natural progression.       Instructed to return to  or nearest emergency department if symptoms fail to improve, for any change in condition, further concerns, or new concerning symptoms.  Patient states understanding of the plan of care and discharge instructions.      Mitchell Sullivan MD, FACE,  [facility]  07/23/22

## 2022-07-26 ENCOUNTER — APPOINTMENT (OUTPATIENT)
Dept: MEDICAL GROUP | Facility: PHYSICIAN GROUP | Age: 27
End: 2022-07-26
Payer: COMMERCIAL

## 2022-07-26 LAB
SARS-COV-2 RNA RESP QL NAA+PROBE: NOTDETECTED
SPECIMEN SOURCE: NORMAL

## 2022-08-02 ENCOUNTER — OFFICE VISIT (OUTPATIENT)
Dept: MEDICAL GROUP | Facility: PHYSICIAN GROUP | Age: 27
End: 2022-08-02
Payer: COMMERCIAL

## 2022-08-02 ENCOUNTER — HOSPITAL ENCOUNTER (OUTPATIENT)
Dept: RADIOLOGY | Facility: MEDICAL CENTER | Age: 27
End: 2022-08-02
Attending: INTERNAL MEDICINE
Payer: COMMERCIAL

## 2022-08-02 VITALS
SYSTOLIC BLOOD PRESSURE: 118 MMHG | TEMPERATURE: 99.1 F | BODY MASS INDEX: 27.06 KG/M2 | WEIGHT: 158.5 LBS | HEIGHT: 64 IN | RESPIRATION RATE: 16 BRPM | DIASTOLIC BLOOD PRESSURE: 84 MMHG | OXYGEN SATURATION: 95 % | HEART RATE: 84 BPM

## 2022-08-02 DIAGNOSIS — J45.41 MODERATE PERSISTENT ASTHMA WITH ACUTE EXACERBATION: ICD-10-CM

## 2022-08-02 DIAGNOSIS — J30.1 SEASONAL ALLERGIC RHINITIS DUE TO POLLEN: ICD-10-CM

## 2022-08-02 DIAGNOSIS — F90.9 ATTENTION DEFICIT HYPERACTIVITY DISORDER (ADHD), UNSPECIFIED ADHD TYPE: Chronic | ICD-10-CM

## 2022-08-02 PROCEDURE — 99214 OFFICE O/P EST MOD 30 MIN: CPT | Performed by: INTERNAL MEDICINE

## 2022-08-02 PROCEDURE — 71046 X-RAY EXAM CHEST 2 VIEWS: CPT

## 2022-08-02 RX ORDER — FLUTICASONE PROPIONATE AND SALMETEROL 250; 50 UG/1; UG/1
1 POWDER RESPIRATORY (INHALATION) EVERY 12 HOURS
Qty: 1 EACH | Refills: 6 | Status: SHIPPED | OUTPATIENT
Start: 2022-08-02 | End: 2022-11-15

## 2022-08-02 RX ORDER — MONTELUKAST SODIUM 10 MG/1
10 TABLET ORAL DAILY
Qty: 30 TABLET | Refills: 5 | Status: SHIPPED | OUTPATIENT
Start: 2022-08-02 | End: 2022-11-15

## 2022-08-02 RX ORDER — FLUTICASONE PROPIONATE 50 MCG
1 SPRAY, SUSPENSION (ML) NASAL 2 TIMES DAILY
Qty: 16 G | Refills: 3 | Status: SHIPPED | OUTPATIENT
Start: 2022-08-02 | End: 2022-11-15

## 2022-08-02 NOTE — ASSESSMENT & PLAN NOTE
Chronic condition.  The patient is taking Zyrtec.  He however continue to experience recurrent sneezing runny nose and congestion.  The patient reported that he was seen by an allergist and received allergy shot previously.  The patient requested referral to to see the allergist.

## 2022-08-02 NOTE — PROGRESS NOTES
"PRIMARY CARE CLINIC VISIT  Chief Complaint   Patient presents with   • Follow-Up     On medications    • ED Follow-Up     Shortness  of breath and sore throat          History of Present Illness     Attention deficit hyperactivity disorder (ADHD)  chronic condition.  The patient is presently being treated with Concerta.  No significant side effects reported.  His symptoms are well controlled    Allergic rhinitis  Chronic condition.  The patient is taking Zyrtec.  He however continue to experience recurrent sneezing runny nose and congestion.  The patient reported that he was seen by an allergist and received allergy shot previously.  The patient requested referral to to see the allergist.    Moderate persistent asthma with acute exacerbation  This is a chronic condition for the patient presently using albuterol only.  He reported recurrent wheezing.  Patient denies fever or chills.      Current Outpatient Medications on File Prior to Visit   Medication Sig Dispense Refill   • methylphenidate (CONCERTA) 54 MG CR tablet Take 1 Tablet by mouth every morning for 30 days. 30 Tablet 0   • albuterol 108 (90 Base) MCG/ACT Aero Soln inhalation aerosol Inhale 2 Puffs every 6 hours as needed for Shortness of Breath. 8.5 g 6   • VITAMIN D PO Take  by mouth.     • Cetirizine HCl (ZYRTEC ALLERGY PO) Take  by mouth.     • Multiple Vitamins-Minerals (MULTIVITAMIN ADULT) Tab Take  by mouth.       No current facility-administered medications on file prior to visit.        Allergies: Patient has no known allergies.    ROS  As per HPI above. All other systems reviewed and negative.      Past Medical, Social, and Family history reviewed and updated in EPIC     Objective     /84 (BP Location: Right arm, Patient Position: Sitting, BP Cuff Size: Adult)   Pulse 84   Temp 37.3 °C (99.1 °F) (Temporal)   Resp 16   Ht 1.626 m (5' 4\")   Wt 71.9 kg (158 lb 8 oz)   SpO2 95%    Body mass index is 27.21 kg/m².    General: alert in no " apparent distress.  Cardiovascular: regular rate and rhythm  Pulmonary: lungs : Minimal expiratory wheezing and reduced breath sound the bases noted  Gastrointestinal: BS present. No obvious mass noted  Neuro nonfocal cranial nerve II to XII grossly intact        Assessment and Plan     1. Seasonal allergic rhinitis due to pollen  - Referral to Allergy  Advised the patient to continue with Zyrtec.  Start Singulair 10 mg p.o. daily.  Recommend Flonase 1 spray twice daily.  2. Moderate persistent asthma with acute exacerbation  - Referral to Allergy  - DX-CHEST-2 VIEWS; Future  Recommend maintenance medication Advair 1 puff twice daily.  Rinse mouth after use.    Patient has pending appointment to see the pulmonologist.  Advised to keep the appointment.  3. Attention deficit hyperactivity disorder (ADHD), unspecified ADHD type  Chronic stable condition.  Continue with Concerta.          Recommend follow-up in 4 to 6 weeks.        Other orders  - montelukast (SINGULAIR) 10 MG Tab; Take 1 Tablet by mouth every day.  Dispense: 30 Tablet; Refill: 5  - fluticasone-salmeterol (ADVAIR) 250-50 MCG/ACT AEROSOL POWDER, BREATH ACTIVATED; Inhale 1 Puff every 12 hours. Rinse mouth after use  Dispense: 1 Each; Refill: 6  - fluticasone (FLONASE) 50 MCG/ACT nasal spray; Administer 1 Spray into affected nostril(S) 2 times a day.  Dispense: 16 g; Refill: 3                        Please note that this dictation was created using voice recognition software. I have made every reasonable attempt to correct obvious errors, but I expect that there are errors of grammar and possibly content that I did not discover before finalizing the note.    Cory Whiteside MD  Internal Medicine  [facility]

## 2022-08-02 NOTE — ASSESSMENT & PLAN NOTE
This is a chronic condition for the patient presently using albuterol only.  He reported recurrent wheezing.  Patient denies fever or chills.   Cryotherapy Text: The wound bed was treated with cryotherapy after the biopsy was performed.

## 2022-08-02 NOTE — ASSESSMENT & PLAN NOTE
chronic condition.  The patient is presently being treated with Concerta.  No significant side effects reported.  His symptoms are well controlled

## 2022-08-22 DIAGNOSIS — F90.9 ATTENTION DEFICIT HYPERACTIVITY DISORDER (ADHD), UNSPECIFIED ADHD TYPE: ICD-10-CM

## 2022-08-23 RX ORDER — METHYLPHENIDATE HYDROCHLORIDE 54 MG/1
54 TABLET ORAL EVERY MORNING
Qty: 30 TABLET | Refills: 0 | Status: SHIPPED | OUTPATIENT
Start: 2022-08-23 | End: 2022-09-22 | Stop reason: SDUPTHER

## 2022-09-01 ENCOUNTER — HOSPITAL ENCOUNTER (EMERGENCY)
Facility: MEDICAL CENTER | Age: 27
End: 2022-09-01
Attending: EMERGENCY MEDICINE
Payer: COMMERCIAL

## 2022-09-01 ENCOUNTER — APPOINTMENT (OUTPATIENT)
Dept: RADIOLOGY | Facility: MEDICAL CENTER | Age: 27
End: 2022-09-01
Attending: EMERGENCY MEDICINE
Payer: COMMERCIAL

## 2022-09-01 VITALS
TEMPERATURE: 98.2 F | OXYGEN SATURATION: 94 % | DIASTOLIC BLOOD PRESSURE: 80 MMHG | SYSTOLIC BLOOD PRESSURE: 124 MMHG | WEIGHT: 156 LBS | RESPIRATION RATE: 18 BRPM | HEART RATE: 91 BPM | HEIGHT: 64 IN | BODY MASS INDEX: 26.63 KG/M2

## 2022-09-01 DIAGNOSIS — J45.41 MODERATE PERSISTENT ASTHMA WITH ACUTE EXACERBATION: ICD-10-CM

## 2022-09-01 PROCEDURE — 99284 EMERGENCY DEPT VISIT MOD MDM: CPT

## 2022-09-01 PROCEDURE — 700111 HCHG RX REV CODE 636 W/ 250 OVERRIDE (IP)

## 2022-09-01 PROCEDURE — 71045 X-RAY EXAM CHEST 1 VIEW: CPT

## 2022-09-01 RX ORDER — PREDNISONE 20 MG/1
60 TABLET ORAL DAILY
Status: COMPLETED | OUTPATIENT
Start: 2022-09-01 | End: 2022-09-01

## 2022-09-01 RX ORDER — PREDNISONE 20 MG/1
60 TABLET ORAL DAILY
Status: DISCONTINUED | OUTPATIENT
Start: 2022-09-02 | End: 2022-09-01

## 2022-09-01 RX ORDER — PREDNISONE 20 MG/1
60 TABLET ORAL DAILY
Qty: 12 TABLET | Refills: 0 | Status: SHIPPED | OUTPATIENT
Start: 2022-09-01 | End: 2022-09-05

## 2022-09-01 RX ADMIN — PREDNISONE 60 MG: 20 TABLET ORAL at 21:25

## 2022-09-02 NOTE — ED NOTES
PIV removed. Patient medicated per MAR. Patient given discharge instructions and verbalized understanding appropriately, denies any further questions or concerns at this time. Patient is alert and oriented and ambulates with a steady gait. Discharged to self.

## 2022-09-02 NOTE — ED TRIAGE NOTES
"Chief Complaint   Patient presents with    Shortness of Breath    Asthma     Patient has Hx of Asthma and had an asthma attack at work ([employer]).     Patient BIBA for above, Patient took his albuterol rescue inhaler prior to calling EMS and gio was also given 2.5 albuterol with 0.5 ipotropium along with two duoneb treatments for a total of 5mg. Patient also given 125 solumedrol IV. Patient not currently experiencing shortness of breath. Patient a/ox4 and in no acute distress at this time.     BP (!) 149/91   Pulse 97   Temp 36.8 °C (98.2 °F) (Temporal)   Resp 19   Ht 1.626 m (5' 4\")   Wt 70.8 kg (156 lb)   SpO2 94%   BMI 26.78 kg/m²    "

## 2022-09-02 NOTE — ED PROVIDER NOTES
ED Provider Note    CHIEF COMPLAINT  Chief Complaint   Patient presents with    Shortness of Breath    Asthma     Patient has Hx of Asthma and had an asthma attack at work ([employer]).       HPI  Gildardo Moody is a 27 y.o. male who presents with shortness of breath.  The patient has a known history of asthma.  He states over the last 24 hours has had increased work of breathing.  He states he has had a productive cough that he attributes to allergies.  When he is walking up several flights of stairs today at work at [employer] he started having significant increased work of breathing.  He was taken to the medical clinic at [facility] and received albuterol and was transferred here for further evaluation.  He has not had any associated fevers.  He has had some nasal congestion.  He has not had any vomiting or diarrhea.  He has not been on any recent steroids nor antibiotics.  He does utilize Advair and inhaled steroid as well as his rescue inhaler as prescribed.    REVIEW OF SYSTEMS  See HPI for further details. All other systems are negative.     PAST MEDICAL HISTORY  Past Medical History:   Diagnosis Date    ADHD (attention deficit hyperactivity disorder)     Allergy     ASTHMA     Depression        FAMILY HISTORY  [unfilled]    SOCIAL HISTORY  Social History     Socioeconomic History    Marital status: Single   Tobacco Use    Smoking status: Former     Packs/day: 0.00     Types: Cigarettes     Quit date: 3/11/2018     Years since quittin.4    Smokeless tobacco: Never   Vaping Use    Vaping Use: Never used   Substance and Sexual Activity    Alcohol use: Yes     Comment: occ    Drug use: Not Currently     Types: Cocaine    Sexual activity: Yes     Partners: Female       SURGICAL HISTORY  Past Surgical History:   Procedure Laterality Date    SHOULDER ARTHROSCOPY W/ SLAP / LABRAL REPAIR  3/13/2014    Performed by Fabián Rosenberg M.D. at [facility]    OTHER  2014    left shoulder dislocation put  "back in place    OTHER  1994    detached retina OU       CURRENT MEDICATIONS  Home Medications    **Home medications have not yet been reviewed for this encounter**         ALLERGIES  No Known Allergies    PHYSICAL EXAM  VITAL SIGNS: BP (!) 149/91   Pulse 97   Temp 36.8 °C (98.2 °F) (Temporal)   Resp 19   Ht 1.626 m (5' 4\")   Wt 70.8 kg (156 lb)   SpO2 94%   BMI 26.78 kg/m²       Constitutional: Mild acute distress, Non-toxic appearance.   HENT: Normocephalic, Atraumatic, Bilateral external ears normal, Oropharynx moist, No oral exudates, Nose normal.   Eyes: PERRLA, EOMI, Conjunctiva normal, No discharge.   Neck: Normal range of motion, No tenderness, Supple, No stridor.   Lymphatic: No lymphadenopathy noted.   Cardiovascular: Normal heart rate, Normal rhythm, No murmurs, No rubs, No gallops.   Thorax & Lungs: Slightly diminished throughout, mild respiratory distress, end expiratory wheezing, No chest tenderness.   Abdomen: Bowel sounds normal, Soft, No tenderness, No masses, No pulsatile masses.   Skin: Warm, Dry, No erythema, No rash.   Back: No tenderness, No CVA tenderness.    Extremities: Intact distal pulses, No edema, No tenderness, No cyanosis, No clubbing.   Neurologic: Alert & oriented x 3, Normal motor function, Normal sensory function, No focal deficits noted.   Psychiatric: Affect normal, Judgment normal, Mood normal.     RADIOLOGY/PROCEDURES  DX-CHEST-PORTABLE (1 VIEW)   Final Result      No radiographic evidence of acute cardiopulmonary process.            COURSE & MEDICAL DECISION MAKING  Pertinent Labs & Imaging studies reviewed. (See chart for details)  This a 27-year-old male who presents emerged part with acute asthma exacerbation.  I suspect this is set off by a viral upper respiratory infection.  Chest x-ray does not show any evidence of a focal process such as pneumonia.  The patient has been treated effectively with albuterol prior to arrival.  He has been observed in the emergency " department and he continues to be nonhypoxic and in no respiratory distress.  The patient will receive prednisone 60 mg prior to discharge.  We will place the patient on 4 more days of prednisone and he will return for increased work of breathing despite his albuterol.    FINAL IMPRESSION  1.  Acute asthma exacerbation    Disposition  Patient will be discharged in stable condition      Electronically signed by: Chauncey Agarwal M.D., 9/1/2022 8:23 PM

## 2022-09-07 ENCOUNTER — TELEPHONE (OUTPATIENT)
Dept: SLEEP MEDICINE | Facility: MEDICAL CENTER | Age: 27
End: 2022-09-07
Payer: COMMERCIAL

## 2022-09-07 NOTE — TELEPHONE ENCOUNTER
9-07-22  1st NO SHOW  Date of No Show:9-6-22  Provider: Gene  Reason For Visit: NP/Pulm  Outcome of call:    No call- pt called 18 min after apt time to resched  Reason Missed: unknown   ACM

## 2022-09-22 DIAGNOSIS — F90.9 ATTENTION DEFICIT HYPERACTIVITY DISORDER (ADHD), UNSPECIFIED ADHD TYPE: ICD-10-CM

## 2022-09-24 RX ORDER — METHYLPHENIDATE HYDROCHLORIDE 54 MG/1
54 TABLET ORAL EVERY MORNING
Qty: 30 TABLET | Refills: 0 | Status: SHIPPED | OUTPATIENT
Start: 2022-09-24 | End: 2023-09-25

## 2022-09-27 ENCOUNTER — APPOINTMENT (OUTPATIENT)
Dept: SLEEP MEDICINE | Facility: MEDICAL CENTER | Age: 27
End: 2022-09-27
Payer: COMMERCIAL

## 2022-11-15 ENCOUNTER — OFFICE VISIT (OUTPATIENT)
Dept: SLEEP MEDICINE | Facility: MEDICAL CENTER | Age: 27
End: 2022-11-15
Payer: COMMERCIAL

## 2022-11-15 VITALS
HEIGHT: 64 IN | BODY MASS INDEX: 28.51 KG/M2 | DIASTOLIC BLOOD PRESSURE: 76 MMHG | HEART RATE: 82 BPM | SYSTOLIC BLOOD PRESSURE: 112 MMHG | OXYGEN SATURATION: 95 % | WEIGHT: 167 LBS

## 2022-11-15 DIAGNOSIS — J45.40 MODERATE PERSISTENT ASTHMA WITHOUT COMPLICATION: ICD-10-CM

## 2022-11-15 DIAGNOSIS — J45.41 MODERATE PERSISTENT ASTHMA WITH ACUTE EXACERBATION: ICD-10-CM

## 2022-11-15 DIAGNOSIS — J30.1 SEASONAL ALLERGIC RHINITIS DUE TO POLLEN: ICD-10-CM

## 2022-11-15 PROCEDURE — 99204 OFFICE O/P NEW MOD 45 MIN: CPT | Performed by: INTERNAL MEDICINE

## 2022-11-15 RX ORDER — METHYLPREDNISOLONE 4 MG/1
TABLET ORAL
Qty: 21 TABLET | Refills: 0 | Status: SHIPPED | OUTPATIENT
Start: 2022-11-15 | End: 2023-03-23

## 2022-11-15 RX ORDER — MOMETASONE FUROATE 200 UG/1
1 AEROSOL RESPIRATORY (INHALATION) DAILY
Qty: 1 EACH | Refills: 0 | COMMUNITY
Start: 2022-11-15 | End: 2023-03-23

## 2022-11-15 ASSESSMENT — ENCOUNTER SYMPTOMS
GASTROINTESTINAL NEGATIVE: 1
MUSCULOSKELETAL NEGATIVE: 1
CARDIOVASCULAR NEGATIVE: 1
EYES NEGATIVE: 1
NEUROLOGICAL NEGATIVE: 1
PSYCHIATRIC NEGATIVE: 1

## 2022-11-15 NOTE — PROGRESS NOTES
Pulmonary Consultation    Date of Service: 11/15/2022    Consulting Physician: Cory Whiteside M.D.    Reason for consult:  Establish Care (Referred by Dr Whiteside for Hemoptysis-Moderate persistent asthma with acute exacerbation) and Other (CXR 09/01/22)      Problem List Items Addressed This Visit       Allergic rhinitis    Relevant Medications    Mometasone Furoate (ASMANEX HFA) 200 MCG/ACT Aerosol    Other Relevant Orders    Referral to Allergy    PULMONARY FUNCTION TESTS -Test requested: Complete Pulmonary Function Test    Moderate persistent asthma with acute exacerbation     Reviewed asthma and management of asthma as well as pathophysiology of asthma and medications.  Reviewed development of fixed asthma if not treated.  Patient really wants to avoid inhalers but did say that may be seeing an allergist would be a better option.  Referral to allergy.  PFTs requested.  Trial of Asmanex as an inhaled corticosteroid to see if this will improve his symptoms.  Patient will let us know if Asmanex is helping him.         Relevant Medications    Mometasone Furoate (ASMANEX HFA) 200 MCG/ACT Aerosol     Other Visit Diagnoses       Moderate persistent asthma without complication        Relevant Medications    Mometasone Furoate (ASMANEX HFA) 200 MCG/ACT Aerosol    Other Relevant Orders    Referral to Allergy    PULMONARY FUNCTION TESTS -Test requested: Complete Pulmonary Function Test              History of Present Illness: Gildardo Moody is a 27 y.o. male with a past medical history of asthma presented to the emergency room September 2022 with worsening shortness of breath post viral infection.  Patient was on Advair and albuterol.  Was on zyrtec had allergy shots from a young kid to age 12  Hives intermittent and unknown trigger pulm  From [facility] not taking any meds.  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   HENT: Negative.     Eyes: Negative.    Respiratory:  Positive for cough, shortness of breath and wheezing.    Cardiovascular: Negative.    Gastrointestinal: Negative.    Genitourinary: Negative.    Musculoskeletal: Negative.    Skin: Negative.    Neurological: Negative.    Endo/Heme/Allergies: Negative.    Psychiatric/Behavioral: Negative.       Current Outpatient Medications on File Prior to Visit   Medication Sig Dispense Refill    albuterol 108 (90 Base) MCG/ACT Aero Soln inhalation aerosol Inhale 2 Puffs every 6 hours as needed for Shortness of Breath. 8.5 g 6    VITAMIN D PO Take  by mouth.      Cetirizine HCl (ZYRTEC ALLERGY PO) Take  by mouth 1 time a day as needed.      Multiple Vitamins-Minerals (MULTIVITAMIN ADULT) Tab Take  by mouth.       No current facility-administered medications on file prior to visit.       Social History     Tobacco Use    Smoking status: Former     Packs/day: 0.00     Years: 0.50     Pack years: 0.00     Types: Cigarettes     Quit date: 3/11/2018     Years since quittin.6     Passive exposure: Never    Smokeless tobacco: Never    Tobacco comments:     Social . Once a month   Vaping Use    Vaping Use: Never used   Substance Use Topics    Alcohol use: Yes     Comment: occ/social    Drug use: Not Currently     Types: Cocaine        Past Medical History:   Diagnosis Date    ADHD (attention deficit hyperactivity disorder)     Allergy     ASTHMA     Depression     Shortness of breath        Past Surgical History:   Procedure Laterality Date    SHOULDER ARTHROSCOPY W/ SLAP / LABRAL REPAIR  3/13/2014    Performed  "by Fabián Rosenberg M.D. at [facility]    OTHER  1/2014    left shoulder dislocation put back in place    OTHER  1994    detached retina OU       Allergies: Patient has no known allergies.    Family History   Family history unknown: Yes       Vitals:    11/15/22 1316   Height: 1.626 m (5' 4\")   Weight: 75.8 kg (167 lb)   Weight % change since last entry.: 0 %   BP: 112/76   Pulse: 82   BMI (Calculated): 28.67       Physical Examination  Physical Exam  Constitutional:       Appearance: Normal appearance.   HENT:      Head: Normocephalic and atraumatic.      Mouth/Throat:      Mouth: Mucous membranes are moist.   Eyes:      Extraocular Movements: Extraocular movements intact.      Pupils: Pupils are equal, round, and reactive to light.   Pulmonary:      Comments: Forced expiratory wheeze  Musculoskeletal:         General: Normal range of motion.      Cervical back: Normal range of motion.   Skin:     General: Skin is warm and dry.   Neurological:      General: No focal deficit present.      Mental Status: He is oriented to person, place, and time.   Psychiatric:         Mood and Affect: Mood normal.         Behavior: Behavior normal.         Thought Content: Thought content normal.         Judgment: Judgment normal.             Lola Romero M.D., MD MPH [facility]  [facility]  "

## 2022-11-17 ASSESSMENT — ENCOUNTER SYMPTOMS
SHORTNESS OF BREATH: 1
COUGH: 1
WHEEZING: 1

## 2022-11-17 NOTE — ASSESSMENT & PLAN NOTE
Reviewed asthma and management of asthma as well as pathophysiology of asthma and medications.  Reviewed development of fixed asthma if not treated.  Patient really wants to avoid inhalers but did say that may be seeing an allergist would be a better option.  Referral to allergy.  PFTs requested.  Trial of Asmanex as an inhaled corticosteroid to see if this will improve his symptoms.  Patient will let us know if Asmanex is helping him.

## 2023-02-07 ENCOUNTER — APPOINTMENT (OUTPATIENT)
Dept: SLEEP MEDICINE | Facility: MEDICAL CENTER | Age: 28
End: 2023-02-07
Payer: COMMERCIAL

## 2023-02-19 NOTE — TELEPHONE ENCOUNTER
Received request via: Patient    Was the patient seen in the last year in this department? Yes    Does the patient have an active prescription (recently filled or refills available) for medication(s) requested? No   1.87

## 2023-03-15 ENCOUNTER — OFFICE VISIT (OUTPATIENT)
Dept: URGENT CARE | Facility: PHYSICIAN GROUP | Age: 28
End: 2023-03-15
Payer: COMMERCIAL

## 2023-03-15 VITALS
WEIGHT: 170 LBS | DIASTOLIC BLOOD PRESSURE: 82 MMHG | OXYGEN SATURATION: 95 % | SYSTOLIC BLOOD PRESSURE: 130 MMHG | HEART RATE: 113 BPM | BODY MASS INDEX: 29.02 KG/M2 | HEIGHT: 64 IN | RESPIRATION RATE: 18 BRPM | TEMPERATURE: 98.2 F

## 2023-03-15 DIAGNOSIS — Z03.818 ENCOUNTER FOR PATIENT CONCERN ABOUT EXPOSURE TO INFECTIOUS ORGANISM: ICD-10-CM

## 2023-03-15 DIAGNOSIS — R00.0 TACHYCARDIA: ICD-10-CM

## 2023-03-15 LAB
FLUAV RNA SPEC QL NAA+PROBE: NEGATIVE
FLUBV RNA SPEC QL NAA+PROBE: NEGATIVE
RSV RNA SPEC QL NAA+PROBE: NEGATIVE
S PYO DNA SPEC NAA+PROBE: NOT DETECTED
SARS-COV-2 RNA RESP QL NAA+PROBE: NEGATIVE

## 2023-03-15 PROCEDURE — 87651 STREP A DNA AMP PROBE: CPT | Performed by: FAMILY MEDICINE

## 2023-03-15 PROCEDURE — 99214 OFFICE O/P EST MOD 30 MIN: CPT | Mod: CS | Performed by: FAMILY MEDICINE

## 2023-03-15 PROCEDURE — 0241U POCT CEPHEID COV-2, FLU A/B, RSV - PCR: CPT | Performed by: FAMILY MEDICINE

## 2023-03-15 NOTE — PROGRESS NOTES
"  Subjective:      27 y.o. male presents to urgent care for cold symptoms that started 3 days ago.  He is experiencing headache, cough, sore throat, ear pressure, and fever.  No diarrhea or body aches.  Heart rate is elevated today in urgent care.  He denies any chest pain, palpitations, shortness of breath. He denies any tobacco product use.  No history of asthma or COPD.  He is fully vaccinated against COVID.  His partner recently tested positive for strep.    He denies any other questions or concerns at this time.    Current problem list, medication, and past medical/surgical history were reviewed in Epic.    ROS  See HPI     Objective:      /82   Pulse (!) 113   Temp 36.8 °C (98.2 °F) (Temporal)   Resp 18   Ht 1.626 m (5' 4\")   Wt 77.1 kg (170 lb)   SpO2 95%   BMI 29.18 kg/m²     Physical Exam  Constitutional:       General: He is not in acute distress.     Appearance: He is not diaphoretic.   HENT:      Right Ear: Tympanic membrane, ear canal and external ear normal.      Left Ear: Tympanic membrane, ear canal and external ear normal.      Mouth/Throat:      Tongue: Tongue does not deviate from midline.      Palate: No lesions.      Pharynx: Uvula midline. Posterior oropharyngeal erythema present.      Tonsils: No tonsillar exudate. 1+ on the right. 1+ on the left.        Comments: Lesion to the marked area  Cardiovascular:      Rate and Rhythm: Regular rhythm. Tachycardia present.      Heart sounds: Normal heart sounds.   Pulmonary:      Effort: Pulmonary effort is normal. No respiratory distress.      Breath sounds: Normal breath sounds.   Neurological:      Mental Status: He is alert.   Psychiatric:         Mood and Affect: Affect normal.         Judgment: Judgment normal.     Assessment/Plan:     1. Encounter for patient concern about exposure to infectious organism  2. Tachycardia  Systemic symptoms seen through tachycardia.  This is asymptomatic.  Negative strep, COVID, flu, and RSV.  " Discussed with patient that lesion on his tonsil is consistent with herpangina's.  Tylenol, ibuprofen, and gargle with warm salt water as needed for symptomatic relief.  - POCT GROUP A STREP, PCR  - POCT CoV-2, Flu A/B, RSV by PCR      Instructed to return to Urgent Care or nearest Emergency Department if symptoms fail to improve, for any change in condition, further concerns, or new concerning symptoms. Patient states understanding of the plan of care and discharge instructions.    Sommer Briggs M.D.

## 2023-03-15 NOTE — LETTER
March 15, 2023    To Whom It May Concern:         This is confirmation that Gildardo Moody attended his scheduled appointment with Sommer Briggs M.D. on 3/15/23.  Negative strep and COVID.  He may return to work on Friday without any restrictions.         If you have any questions please do not hesitate to call me at the phone number listed below.    Sincerely,          Sommer Briggs M.D.  [phone]

## 2023-03-17 ENCOUNTER — OFFICE VISIT (OUTPATIENT)
Dept: URGENT CARE | Facility: PHYSICIAN GROUP | Age: 28
End: 2023-03-17
Payer: COMMERCIAL

## 2023-03-17 VITALS
TEMPERATURE: 97.2 F | RESPIRATION RATE: 18 BRPM | OXYGEN SATURATION: 95 % | HEART RATE: 83 BPM | HEIGHT: 64 IN | DIASTOLIC BLOOD PRESSURE: 80 MMHG | WEIGHT: 173 LBS | SYSTOLIC BLOOD PRESSURE: 130 MMHG | BODY MASS INDEX: 29.53 KG/M2

## 2023-03-17 DIAGNOSIS — J02.9 SORE THROAT: ICD-10-CM

## 2023-03-17 LAB
HETEROPH AB SER QL LA: NEGATIVE
POCT INT CON NEG: NEGATIVE
POCT INT CON POS: POSITIVE

## 2023-03-17 PROCEDURE — 99214 OFFICE O/P EST MOD 30 MIN: CPT

## 2023-03-17 PROCEDURE — 86308 HETEROPHILE ANTIBODY SCREEN: CPT

## 2023-03-17 RX ORDER — ACETAMINOPHEN 500 MG
500 TABLET ORAL ONCE
Status: COMPLETED | OUTPATIENT
Start: 2023-03-17 | End: 2023-03-17

## 2023-03-17 RX ORDER — AMOXICILLIN 500 MG/1
500 CAPSULE ORAL 2 TIMES DAILY
Qty: 20 CAPSULE | Refills: 0 | Status: SHIPPED | OUTPATIENT
Start: 2023-03-17 | End: 2023-03-27

## 2023-03-17 RX ORDER — DEXAMETHASONE SODIUM PHOSPHATE 10 MG/ML
8 INJECTION INTRAMUSCULAR; INTRAVENOUS ONCE
Status: COMPLETED | OUTPATIENT
Start: 2023-03-17 | End: 2023-03-17

## 2023-03-17 RX ORDER — IBUPROFEN 200 MG
600 TABLET ORAL ONCE
Status: COMPLETED | OUTPATIENT
Start: 2023-03-17 | End: 2023-03-17

## 2023-03-17 RX ADMIN — Medication 600 MG: at 11:59

## 2023-03-17 RX ADMIN — DEXAMETHASONE SODIUM PHOSPHATE 8 MG: 10 INJECTION INTRAMUSCULAR; INTRAVENOUS at 13:18

## 2023-03-17 RX ADMIN — Medication 500 MG: at 11:58

## 2023-03-17 NOTE — LETTER
[facility]  [facility] [address] [address]     March 17, 2023    Patient: Gildardo Moody   YOB: 1995   Date of Visit: 3/17/2023       To Whom It May Concern:    Gildardo Moody was seen and treated in our department on 3/17/2023. Please release from work 03/17 and 03/18.      Sincerely,     GLEN Corona.

## 2023-03-17 NOTE — LETTER
[facility]  [facility] [address] [address]     March 17, 2023    Patient: Gildardo Moody   YOB: 1995   Date of Visit: 3/17/2023       To Whom It May Concern:    Gildardo Moody was seen and treated in our department on 3/17/2023. Please release from work 03/18    Sincerely,     GLEN Corona.

## 2023-03-17 NOTE — PROGRESS NOTES
Chief Complaint   Patient presents with    Follow-Up     All tests were NEG 2 days ago. Still has sore throat. And getting worse. Nausea. More lesions have appeared.         HISTORY OF PRESENT ILLNESS: Patient is a pleasant 27 y.o. male who presents to urgent care today for sore throat for the last 5 days, seen here two days ago and swabbed for strep which was negative.  Returns with increased pain and he states noted swollen tonsils.  No noted fevers.  Positive fatigue.    Patient Active Problem List    Diagnosis Date Noted    Cough 04/26/2022    Hemoptysis 04/26/2022    Moderate persistent asthma with acute exacerbation 04/26/2022    Eczematous dermatitis 08/30/2021    Prediabetes 08/30/2021    Dyslipidemia 08/30/2021    Allergic rhinitis 05/27/2021    Attention deficit hyperactivity disorder (ADHD) 03/11/2019       Allergies:Patient has no known allergies.    Current Outpatient Medications Ordered in Epic   Medication Sig Dispense Refill    albuterol 108 (90 Base) MCG/ACT Aero Soln inhalation aerosol Inhale 2 Puffs every 6 hours as needed for Shortness of Breath. 8.5 g 6    Cetirizine HCl (ZYRTEC ALLERGY PO) Take  by mouth 1 time a day as needed.      Mometasone Furoate (ASMANEX HFA) 200 MCG/ACT Aerosol Inhale 1 Puff every day. Rinse mouth after each use. (Patient not taking: Reported on 3/15/2023) 1 Each 0    methylPREDNISolone (MEDROL DOSEPAK) 4 MG Tablet Therapy Pack Take as directed. (Patient not taking: Reported on 3/15/2023) 21 Tablet 0    VITAMIN D PO Take  by mouth. (Patient not taking: Reported on 3/15/2023)      Multiple Vitamins-Minerals (MULTIVITAMIN ADULT) Tab Take  by mouth. (Patient not taking: Reported on 3/15/2023)       No current [facility]-ordered facility-administered medications on file.       Past Medical History:   Diagnosis Date    ADHD (attention deficit hyperactivity disorder)     Allergy     ASTHMA     Depression     Shortness of breath        Social History     Tobacco Use    Smoking  "status: Former     Packs/day: 0.00     Years: 0.50     Pack years: 0.00     Types: Cigarettes     Quit date: 3/11/2018     Years since quittin.0     Passive exposure: Never    Smokeless tobacco: Never    Tobacco comments:     Social . Once a month   Vaping Use    Vaping Use: Never used   Substance Use Topics    Alcohol use: Yes     Comment: occ/social    Drug use: Not Currently     Types: Cocaine       Family Status   Relation Name Status    Mo  Alive    Fa  Alive     Family History   Family history unknown: Yes       ROS:  Review of Systems   Constitutional: Negative for fever, chills, weight loss, malaise, positive fatigue.   HENT: Negative for ear pain, nosebleeds, congestion, positive sore throat and negative neck pain.    Eyes: Negative for vision changes.   Neuro: Negative for headache, sensory changes, weakness, seizure, LOC.   Cardiovascular: Negative for chest pain, palpitations, orthopnea and leg swelling.   Respiratory: Negative for cough, sputum production, shortness of breath and wheezing.   Gastrointestinal: Negative for abdominal pain, nausea, vomiting, positive diarrhea.   Genitourinary: Negative for dysuria, urgency and frequency.  Musculoskeletal: Negative for falls, neck pain, back pain, joint pain, myalgias.   Skin: Negative for rash, diaphoresis.     Exam:  /80   Pulse 83   Temp 36.2 °C (97.2 °F) (Temporal)   Resp 18   Ht 1.626 m (5' 4\")   Wt 78.5 kg (173 lb)   SpO2 95%   General: well-nourished, well-developed male in NAD  Head: normocephalic, atraumatic  Eyes: PERRLA, no conjunctival injection, acuity grossly intact, lids normal.  Ears: normal shape and symmetry, no tenderness, no discharge. External canals are without any significant edema or erythema. Tympanic membranes are without any inflammation, no effusion. Gross auditory acuity is intact.  Nose: symmetrical without tenderness, no discharge.  Mouth/Throat: reasonable hygiene, positive erythema, positive exudates or " tonsillar enlargement, 2+  Neck: no masses, range of motion within normal limits, no tracheal deviation. No obvious thyroid enlargement.   Lymph: no cervical adenopathy. No supraclavicular adenopathy.   Neuro: alert and oriented. Cranial nerves 1-12 grossly intact. No sensory deficit.   Cardiovascular: regular rate and rhythm. No edema.  Pulmonary: no distress. Chest is symmetrical with respiration, no wheezes, crackles, or rhonchi.   Abdomen: soft, non-tender, no guarding, no hepatosplenomegaly.  Musculoskeletal: no clubbing, appropriate muscle tone, gait is stable.  Skin: warm, dry, intact, no clubbing, no cyanosis, no rashes.   Psych: appropriate mood, affect, judgement.         Assessment/Plan:  1. Sore throat  POCT Mononucleosis (mono)    acetaminophen (TYLENOL) tablet 500 mg    ibuprofen (MOTRIN) tablet 600 mg    amoxicillin (AMOXIL) 500 MG Cap    dexamethasone (DECADRON) injection (check route below) 8 mg      Patient is a pleasant 19-year-old who comes in today with ongoing complaints of a sore throat, tonsillar enlargement, with exudate.  He states he is feeling really tired.  Denies any nausea vomiting, does have positive diarrhea.  No noted temp fevers.  Positive body aches and chills.  Denies any other symptoms otherwise.  On assessment he does have noted tonsillar enlargement 2+, with exudate, uvula is midline.  The tonsils are not touching at this time.  Oral airway is otherwise intact.  No cervical lymph nodes noted.  Lung sounds are clear.  Some stomach is soft to palpation.  I did based off of the patient's exhibited signs and symptoms along with review of systems decided to do a monotest at this time.  Which was negative.  This is the patient's second visit to our office for this condition, I did give him a shot of Decadron at this time for the swelling and pain.  I also decided to treat him with antibiotics as this is his second visit and he does appear to have exudate at this time.  Patient was  advised that if he does not get better, continues to get worse, to please follow-up here or in the emergency room, PCP.  Patient was also additionally given Tylenol and Motrin while here in the office today to help with his pain.      Supportive care, differential diagnoses, and indications for immediate follow-up discussed with patient.   Pathogenesis of diagnosis discussed including typical length and natural progression.   Instructed to return to clinic or nearest emergency department for any change in condition, further concerns, or worsening of symptoms.  Patient states understanding of the plan of care and discharge instructions.  Instructed to make an appointment, for follow up, with primary care provider.        Please note that this dictation was created using voice recognition software. I have made every reasonable attempt to correct obvious errors, but I expect that there are errors of grammar and possibly content that I did not discover before finalizing the note.      Amber ALVES

## 2023-03-23 ENCOUNTER — OFFICE VISIT (OUTPATIENT)
Dept: URGENT CARE | Facility: PHYSICIAN GROUP | Age: 28
End: 2023-03-23
Payer: COMMERCIAL

## 2023-03-23 VITALS
HEIGHT: 64 IN | HEART RATE: 81 BPM | WEIGHT: 170 LBS | BODY MASS INDEX: 29.02 KG/M2 | OXYGEN SATURATION: 96 % | DIASTOLIC BLOOD PRESSURE: 78 MMHG | SYSTOLIC BLOOD PRESSURE: 128 MMHG | TEMPERATURE: 98 F | RESPIRATION RATE: 14 BRPM

## 2023-03-23 DIAGNOSIS — K12.1 STOMATITIS: ICD-10-CM

## 2023-03-23 PROCEDURE — 99214 OFFICE O/P EST MOD 30 MIN: CPT | Performed by: NURSE PRACTITIONER

## 2023-03-23 RX ORDER — DEXAMETHASONE SODIUM PHOSPHATE 10 MG/ML
10 INJECTION INTRAMUSCULAR; INTRAVENOUS ONCE
Status: COMPLETED | OUTPATIENT
Start: 2023-03-23 | End: 2023-03-23

## 2023-03-23 RX ORDER — DIPHENHYDRAMINE HYDROCHLORIDE AND LIDOCAINE HYDROCHLORIDE AND ALUMINUM HYDROXIDE AND MAGNESIUM HYDRO
5-10 KIT EVERY 6 HOURS PRN
Qty: 237 ML | Refills: 0 | Status: SHIPPED | OUTPATIENT
Start: 2023-03-23 | End: 2023-09-25

## 2023-03-23 RX ADMIN — DEXAMETHASONE SODIUM PHOSPHATE 10 MG: 10 INJECTION INTRAMUSCULAR; INTRAVENOUS at 18:27

## 2023-03-23 ASSESSMENT — ENCOUNTER SYMPTOMS
SORE THROAT: 1
VOMITING: 0
SPUTUM PRODUCTION: 0
EYE REDNESS: 0
CHILLS: 0
FEVER: 0
SINUS PAIN: 0
HEADACHES: 1
DIARRHEA: 1
NAUSEA: 0
EYE PAIN: 0
HEMOPTYSIS: 0
COUGH: 0
EYE DISCHARGE: 0
SHORTNESS OF BREATH: 0

## 2023-03-23 NOTE — LETTER
[facility] [place]  [address] [id number]     March 23, 2023    Patient: Gildardo Moody   YOB: 1995   Date of Visit: 3/23/2023       To Whom It May Concern:    Gildardo Moody was seen and treated in our department on 3/23/2023.  He will need to be excused from work due to illness and may return on 3/27/2023 without restrictions.    Sincerely,                                                                                Letter                             AMB WORK/SCHOOL EXCUSE              Replace Template                                                                                Details                                                                                        Information                                                                                                             Insert SmartText                                              GLEN Vines.

## 2023-03-24 NOTE — PROGRESS NOTES
Patient has consented to treatment and for use of patient information for treatment and billing purposes.    Chief Complaint:    Chief Complaint   Patient presents with    Pharyngitis     Pt was seen at  on 3/17 is still having severe pain radiates on R side from Ear down neck, antibiotics not helping         History of Present Illness: 27 y.o.  male presents to clinic with sore throat on the right side and right ear pain Starting March 12 that has continued and worsened.  Patient states that he has an ulcer on his oral mucosa on the right-hand side.  Patient has been seen in urgent care twice and was recently placed on amoxicillin on 3/17/2023.  He has had COVID, flu, RSV, mono, and strep which were all negative.  Patient does have 2 remaining days of amoxicillin.  He has been taking over-the-counter ibuprofen and acetaminophen with only mild symptom relief.        Review of Systems   Constitutional:  Positive for malaise/fatigue. Negative for chills and fever.   HENT:  Positive for ear pain and sore throat. Negative for congestion, ear discharge and sinus pain.    Eyes:  Negative for pain, discharge and redness.   Respiratory:  Negative for cough, hemoptysis, sputum production and shortness of breath.    Gastrointestinal:  Positive for diarrhea. Negative for nausea and vomiting.   Neurological:  Positive for headaches.     Medications, Allergies, and current problem list reviewed today in Epic.    Physical Exam:    Vitals:    03/23/23 1803   BP: 128/78   Pulse: 81   Resp: 14   Temp: 36.7 °C (98 °F)   SpO2: 96%             Physical Exam  Vitals reviewed.   Constitutional:       General: He is not in acute distress.     Appearance: Normal appearance. He is not toxic-appearing.   HENT:      Right Ear: No middle ear effusion. Tympanic membrane is erythematous.      Left Ear: Tympanic membrane, ear canal and external ear normal.      Nose: Mucosal edema and rhinorrhea present. Rhinorrhea is clear.      Right Sinus:  No maxillary sinus tenderness or frontal sinus tenderness.      Left Sinus: No maxillary sinus tenderness or frontal sinus tenderness.      Mouth/Throat:      Lips: Pink. No lesions.      Mouth: Mucous membranes are moist.      Dentition: Normal dentition. No gingival swelling.      Tongue: No lesions.      Palate: Lesions present.      Pharynx: Posterior oropharyngeal erythema present. No oropharyngeal exudate or uvula swelling.      Tonsils: No tonsillar exudate. 1+ on the right. 0 on the left.        Comments: Ulceration noted to soft upper palate near right tonsil.  No exudate noted.  Positive for erythema and post oropharynx.  Cardiovascular:      Rate and Rhythm: Normal rate and regular rhythm.      Heart sounds: No murmur heard.  Pulmonary:      Effort: Pulmonary effort is normal. No respiratory distress.      Breath sounds: Normal breath sounds. No wheezing, rhonchi or rales.   Musculoskeletal:      Cervical back: Normal range of motion.   Skin:     General: Skin is warm and dry.   Neurological:      Mental Status: He is alert.          Medical Decision Making:  I personally reviewed prior external notes and test results pertinent to today's visit.   Shared decision-making was utilized with patient did develop treatment plan and clinic course.     Alanna, 27 y.o. male,  presents to clinic with sore throat on the right side and right ear pain Starting March 12 that has continued and worsened.  Patient states that he has an ulcer on his oral mucosa on the right-hand side.  Patient has been seen in urgent care twice and was recently placed on amoxicillin on 3/17/2023.  He has had COVID, flu, RSV, mono, and strep which were all negative.      Previous urgent care visits show negative COVID, influenza, RSV, mono, and strep.  HPI and physical exam findings are consistent with stomatitis to oral mucosa near right tonsil.  He does have mild tonsillar enlargement on right side without exudate.  Uvula is midline and  tonsils are currently not touching.  Oral airway is otherwise clear.  Discussed with patient at length that this is most likely viral in nature and could take up to 2 to 4 weeks to completely heal.  Patient does report previous history of mouth ulcers.  Low suspicion at this time for tonsillar abscess.  Did discuss signs and symptoms and recommended patient follow-up in the emergency department if symptoms continue or worsen over the next 2 to 3 days.  Recommended patient complete antibiotic course.  Repeat Decadron dose was given in clinic today and miracle mouthwash was sent to pharmacy.    The patient remained stable during the urgent care visit.    Plan:    Medication discussed included indication for use and the potential  benefits and side effects.    Administrations This Visit       dexamethasone (DECADRON) injection (check route below) 10 mg       Admin Date  03/23/2023 Action  Given Dose  10 mg Route  Oral Administered By  Galina Zendejas, Med Ass't                     1. Stomatitis  - DPH-Lido-AlHydr-MgHydr-Simeth (MAGIC MOUTHWASH BLM) Suspension; Take 5-10 mL by mouth every 6 hours as needed (Oral stomatitis).  Dispense: 237 mL; Refill: 0  - dexamethasone (DECADRON) injection (check route below) 10 mg      Verbal and/or printed education was provided regarding the assessment and diagnosis.  All of the patient's questions were answered to their satisfaction at the time of discharge.    Follow up:    Recommended f/u in  2-3 days if there is no improvement.    Patient was encouraged to monitor symptoms closely. Those signs and symptoms which would warrant concern and mandate seeking a higher level of service through the emergency department discussed at length and included in discharge papers.  Patient stated agreement and understanding of this plan of care.    Disposition:  Home in stable condition       Voice Recognition Disclaimer:  Portions of this document were created using voice recognition  software. The software does have a chance of producing errors of grammar and possibly content. I have made every reasonable attempt to correct obvious errors, but there may be errors of grammar and possibly content that I did not discover before finalizing the documentation.

## 2023-05-09 ENCOUNTER — NON-PROVIDER VISIT (OUTPATIENT)
Dept: SLEEP MEDICINE | Facility: MEDICAL CENTER | Age: 28
End: 2023-05-09
Attending: INTERNAL MEDICINE
Payer: COMMERCIAL

## 2023-05-09 VITALS — WEIGHT: 168 LBS | HEIGHT: 64 IN | BODY MASS INDEX: 28.68 KG/M2

## 2023-05-09 DIAGNOSIS — J45.40 MODERATE PERSISTENT ASTHMA WITHOUT COMPLICATION: ICD-10-CM

## 2023-05-09 DIAGNOSIS — J30.1 SEASONAL ALLERGIC RHINITIS DUE TO POLLEN: ICD-10-CM

## 2023-05-09 PROCEDURE — 94060 EVALUATION OF WHEEZING: CPT | Mod: 26 | Performed by: INTERNAL MEDICINE

## 2023-05-09 PROCEDURE — 94060 EVALUATION OF WHEEZING: CPT | Performed by: INTERNAL MEDICINE

## 2023-05-09 PROCEDURE — 94729 DIFFUSING CAPACITY: CPT | Performed by: INTERNAL MEDICINE

## 2023-05-09 PROCEDURE — 94726 PLETHYSMOGRAPHY LUNG VOLUMES: CPT | Mod: 26 | Performed by: INTERNAL MEDICINE

## 2023-05-09 PROCEDURE — 94729 DIFFUSING CAPACITY: CPT | Mod: 26 | Performed by: INTERNAL MEDICINE

## 2023-05-09 PROCEDURE — 94726 PLETHYSMOGRAPHY LUNG VOLUMES: CPT | Performed by: INTERNAL MEDICINE

## 2023-05-09 ASSESSMENT — PULMONARY FUNCTION TESTS
FEV1/FVC_PERCENT_LLN: 71
FVC: 4.52
FVC: 4.54
FEV1: 3.77
FEV1/FVC: 86.34
FEV1/FVC: 83
FEV1/FVC_PERCENT_PREDICTED: 103
FEV1: 3.92
FVC_PREDICTED: 4.42
FVC_LLN: 3.69
FEV1/FVC: 83
FEV1_PERCENT_CHANGE: 4
FEV1/FVC_PERCENT_PREDICTED: 102
FEV1/FVC_PERCENT_CHANGE: 3
FEV1_PREDICTED: 3.72
FEV1_PERCENT_PREDICTED: 105
FEV1/FVC_PERCENT_PREDICTED: 98
FEV1_PERCENT_PREDICTED: 101
FEV1/FVC_PREDICTED: 84
FVC_PERCENT_PREDICTED: 102
FEV1_PERCENT_CHANGE: 0
FEV1_LLN: 3.11
FVC_PERCENT_PREDICTED: 102
FEV1/FVC: 86
FEV1/FVC_PERCENT_PREDICTED: 84
FEV1/FVC_PERCENT_PREDICTED: 99

## 2023-05-09 NOTE — PROCEDURES
Technician: Kayleigh Mays RRT, CPFT  Good patient effort & cooperation.  The results of this test meet the ATS/ERS standards for acceptability & reproducibility.  Test was performed on the S[employer] Medical Body Plethysmograph-Elite DX system.  Predicted equations for Spirometry are GLI-2012, ITS for lung volumes, and GLI-2017 for DLCO.  The DLCO was uncorrected for Hgb.  A bronchodilator of Ventolin HFA -2puffs via spacer administered.  DLCO performed during dilation period.    Interpretation:  Normal baseline spirometry with a negative bronchodilator response.  Normal flow volume loops.  Full lung volumes are normal with the exception of a isolated reduction in ERV.  The DLCO is normal.    Summary:  Normal spirometry with the exception of a reduction in ERV.  Correlate with body habitus.    Emmett Fleming DO  Staff Pulmonologist and Intensivist  [facility]     Please note that this dictation was created using voice recognition software. The accuracy of the dictation is limited to the abilities of the software. I have made every reasonable attempt to correct obvious errors, but I expect that there are errors of grammar and possibly content that I did not discover before finalizing the note.

## 2023-05-30 NOTE — PROGRESS NOTES
This evaluation was conducted via Zoom using secure and encrypted videoconferencing technology. The patient was in a private location outside of their home in the state [place].    The patient's identity was confirmed and verbal consent was obtained for this virtual visit.                Pulmonary Clinic Note    Chief Complaint:  Chief Complaint   Patient presents with    Asthma     Last seen 11/15/22    Results     PFT 23    Medication Management     Trial: asmanex         HPI:   This patient is a 28 y.o. male whom is followed in our clinic for allergic rhinitis and asthma last seen by Dr. Dumont on 11/15/22. Last visit trialed asmanex. Significant history of allergy, on zyrtec, history of allergy shots as a child.  He reports since his last visit with DR. Dumont he has seen allergy and has been started on singulair along with his zyrtec which is helping.  He reports he is using just albuterol once every 2 weeks and this controls his symptoms.  HE tried the asmanex but didn't notice a difference.      Past Medical History:   Diagnosis Date    ADHD (attention deficit hyperactivity disorder)     Allergy     ASTHMA     Depression     Shortness of breath        Social History     Socioeconomic History    Marital status: Single     Spouse name: Not on file    Number of children: Not on file    Years of education: Not on file    Highest education level: Not on file   Occupational History    Not on file   Tobacco Use    Smoking status: Former     Packs/day: 0.00     Years: 0.50     Pack years: 0.00     Types: Cigarettes     Quit date: 3/11/2018     Years since quittin.2     Passive exposure: Never    Smokeless tobacco: Never    Tobacco comments:     Social . Once a month   Vaping Use    Vaping Use: Never used   Substance and Sexual Activity    Alcohol use: Yes     Comment: occ/social    Drug use: Not Currently     Types: Cocaine    Sexual activity: Yes     Partners: Female   Other Topics Concern    Not on file  "  Social History Narrative    Not on file     Social Determinants of Health     Financial Resource Strain: Not on file   Food Insecurity: Not on file   Transportation Needs: Not on file   Physical Activity: Not on file   Stress: Not on file   Social Connections: Not on file   Intimate Partner Violence: Not on file   Housing Stability: Not on file          Family History   Family history unknown: Yes       Current Outpatient Medications on File Prior to Visit   Medication Sig Dispense Refill    montelukast (SINGULAIR) 10 MG Tab Take 10 mg by mouth every day.      albuterol 108 (90 Base) MCG/ACT Aero Soln inhalation aerosol Inhale 2 Puffs every 6 hours as needed for Shortness of Breath. 8.5 g 6    Cetirizine HCl (ZYRTEC ALLERGY PO) Take  by mouth 1 time a day as needed.      DPH-Lido-AlHydr-MgHydr-Simeth (MAGIC MOUTHWASH BLM) Suspension Take 5-10 mL by mouth every 6 hours as needed (Oral stomatitis). (Patient not taking: Reported on 5/31/2023) 237 mL 0     No current facility-administered medications on file prior to visit.       Patient has no known allergies.      ROS:   Review of Systems   Constitutional:  Negative for chills, fever and malaise/fatigue.   HENT:  Negative for congestion.    Respiratory:  Negative for cough, shortness of breath and wheezing.    Cardiovascular:  Negative for chest pain, palpitations and leg swelling.   Neurological:  Negative for dizziness and headaches.       Vitals:  Ht 1.626 m (5' 4\")   Wt 77.1 kg (170 lb)     Physical Exam:  Physical Exam deferred due to virtual visit.     Vaccinations:    Pneumovax: 2022  Covid: Vaccinated  Annual Flu: due    Pertinent Studies:  Laboratory Data:    6MWT:    PFTs as reviewed by me personally show:    Imaging as reviewed by me personally show:      Pertinent Cardiac Studies:  Echo:    Assessment/Plan:  Problem List Items Addressed This Visit       Moderate persistent asthma with acute exacerbation     Controlled with PRN albuterol and allergy " treatment at the moment. Discussed PFT today, consistent with mild asthma, well controlled.   Plan:  - Continue to follow with allergist  - Continue singulair and zyrtec  - Continue PRN albuterol  - Advair 230 sent to pharmacy for use if he has a flare  - Follow up in 1 year           Relevant Medications    montelukast (SINGULAIR) 10 MG Tab    fluticasone-salmeterol (ADVAIR HFA) 230-21 MCG/ACT inhaler     Other Visit Diagnoses       Moderate persistent asthma, unspecified whether complicated        Relevant Medications    montelukast (SINGULAIR) 10 MG Tab    fluticasone-salmeterol (ADVAIR HFA) 230-21 MCG/ACT inhaler              Return in about 1 year (around 5/31/2024).   (return to office in...)    Time spent in record review prior to patient arrival, reviewing results, and in face-to-face encounter totaled 20 min, excluding any procedures if performed.    Keila Yarbrough MD RD  Pulmonary and Critical Care

## 2023-05-31 ENCOUNTER — TELEMEDICINE (OUTPATIENT)
Dept: SLEEP MEDICINE | Facility: MEDICAL CENTER | Age: 28
End: 2023-05-31
Attending: INTERNAL MEDICINE
Payer: COMMERCIAL

## 2023-05-31 VITALS — BODY MASS INDEX: 29.02 KG/M2 | WEIGHT: 170 LBS | HEIGHT: 64 IN

## 2023-05-31 DIAGNOSIS — J45.41 MODERATE PERSISTENT ASTHMA WITH ACUTE EXACERBATION: ICD-10-CM

## 2023-05-31 DIAGNOSIS — J45.40 MODERATE PERSISTENT ASTHMA, UNSPECIFIED WHETHER COMPLICATED: ICD-10-CM

## 2023-05-31 PROCEDURE — 99213 OFFICE O/P EST LOW 20 MIN: CPT | Mod: 95 | Performed by: INTERNAL MEDICINE

## 2023-05-31 RX ORDER — MONTELUKAST SODIUM 10 MG/1
10 TABLET ORAL DAILY
COMMUNITY
End: 2023-09-25 | Stop reason: SDUPTHER

## 2023-05-31 RX ORDER — FLUTICASONE PROPIONATE AND SALMETEROL XINAFOATE 230; 21 UG/1; UG/1
2 AEROSOL, METERED RESPIRATORY (INHALATION) 2 TIMES DAILY
Qty: 12 G | Refills: 3 | Status: SHIPPED | OUTPATIENT
Start: 2023-05-31 | End: 2023-09-25

## 2023-05-31 ASSESSMENT — ENCOUNTER SYMPTOMS
PALPITATIONS: 0
HEADACHES: 0
CHILLS: 0
WHEEZING: 0
COUGH: 0
DIZZINESS: 0
SHORTNESS OF BREATH: 0
FEVER: 0

## 2023-05-31 ASSESSMENT — PATIENT HEALTH QUESTIONNAIRE - PHQ9: CLINICAL INTERPRETATION OF PHQ2 SCORE: 0

## 2023-05-31 NOTE — ASSESSMENT & PLAN NOTE
Controlled with PRN albuterol and allergy treatment at the moment. Discussed PFT today, consistent with mild asthma, well controlled.   Plan:  - Continue to follow with allergist  - Continue singulair and zyrtec  - Continue PRN albuterol  - Advair 230 sent to pharmacy for use if he has a flare  - Follow up in 1 year

## 2023-08-15 ENCOUNTER — APPOINTMENT (OUTPATIENT)
Dept: RADIOLOGY | Facility: IMAGING CENTER | Age: 28
End: 2023-08-15
Attending: FAMILY MEDICINE
Payer: COMMERCIAL

## 2023-08-15 ENCOUNTER — OFFICE VISIT (OUTPATIENT)
Dept: URGENT CARE | Facility: PHYSICIAN GROUP | Age: 28
End: 2023-08-15
Payer: COMMERCIAL

## 2023-08-15 VITALS
TEMPERATURE: 98.4 F | HEIGHT: 64 IN | WEIGHT: 176 LBS | DIASTOLIC BLOOD PRESSURE: 88 MMHG | RESPIRATION RATE: 14 BRPM | SYSTOLIC BLOOD PRESSURE: 126 MMHG | BODY MASS INDEX: 30.05 KG/M2 | OXYGEN SATURATION: 98 % | HEART RATE: 64 BPM

## 2023-08-15 DIAGNOSIS — M79.672 LEFT FOOT PAIN: ICD-10-CM

## 2023-08-15 DIAGNOSIS — S93.402A SPRAIN OF LEFT ANKLE, UNSPECIFIED LIGAMENT, INITIAL ENCOUNTER: ICD-10-CM

## 2023-08-15 PROCEDURE — 73610 X-RAY EXAM OF ANKLE: CPT | Mod: TC,FY,LT | Performed by: FAMILY MEDICINE

## 2023-08-15 PROCEDURE — 3074F SYST BP LT 130 MM HG: CPT | Performed by: FAMILY MEDICINE

## 2023-08-15 PROCEDURE — 3079F DIAST BP 80-89 MM HG: CPT | Performed by: FAMILY MEDICINE

## 2023-08-15 PROCEDURE — 99215 OFFICE O/P EST HI 40 MIN: CPT | Performed by: FAMILY MEDICINE

## 2023-08-15 PROCEDURE — 73630 X-RAY EXAM OF FOOT: CPT | Mod: TC,FY,LT | Performed by: FAMILY MEDICINE

## 2023-08-15 NOTE — PROGRESS NOTES
"Chief Complaint   Patient presents with    Foot Injury     L foot, ankle and top of foot          C/o constant, sharp left foot/ankle pain x 2 days, after trip and fall, twisting left ankle/foot    Pain is worse with wtbearing.    Motrin helps, \"a little.\"              Pertinent negatives include no  loss of motion, muscle weakness, numbness or tingling. The symptoms are aggravated by weight bearing and palpation.          Social History     Tobacco Use    Smoking status: Former     Packs/day: 0.00     Years: 0.50     Pack years: 0.00     Types: Cigarettes     Quit date: 3/11/2018     Years since quittin.4     Passive exposure: Never    Smokeless tobacco: Never    Tobacco comments:     Social . Once a month   Vaping Use    Vaping Use: Never used   Substance Use Topics    Alcohol use: Yes     Comment: occ/social    Drug use: Not Currently     Types: Cocaine         Past Medical History:   Diagnosis Date    ADHD (attention deficit hyperactivity disorder)     Allergy     ASTHMA     Depression     Shortness of breath          Family hx was reviewed - no pertinent past family hx        Review of Systems   Constitutional: Negative for fever, chills and weight loss.   HENT - denies cough, ear pain, congestion, sore throat  Eyes: denies vision changes, discharge  Respiratory: Negative for cough and wheezing.    Cardiovascular: Negative for chest pain or PND.   Gastrointestinal:  No abdominal pain,  nausea, vomiting, diarrhea.  Negative for  blood in stool.    - no discharge, dysuria, frequency.      Neurological: Negative for dizziness and headaches.   musculoskeletal - denies myalgias, calf pain  Psych - denies anxiety/depression/mood changes.  Skin: no itching or rash  All other systems reviewed and are negative.           Objective:     /88   Pulse 64   Temp 36.9 °C (98.4 °F)   Resp 14   Ht 1.626 m (5' 4\")   Wt 79.8 kg (176 lb)   SpO2 98%         Physical Exam   Constitutional: pt is oriented to person, " place, and time. Pt appears well-developed. No distress.   HENT:   Head: Normocephalic and atraumatic.   Eyes: Conjunctivae are normal.   Cardiovascular: Normal rate and regular rhythm.    Pulmonary/Chest: Effort normal and breath sounds normal.   Musculoskeletal:        Left ankle: pt exhibits no edema or ecchymosis.   + lateral malleolus tenderness noted.        Achilles tendon normal.        Left foot: + tender to palpation over lateral foot.   + bruising over 2nd, 3rd  base of MTPs   There is normal range of motion, normal capillary refill and no crepitus.   Neurological: pt is alert and oriented to person, place, and time. No cranial nerve deficit.   Skin: Skin is warm. Pt is not diaphoretic. No erythema.   Psychiatric: His behavior is normal.   Nursing note and vitals reviewed.    Details    Reading Physician Reading Date Result Priority   Hood Montanez M.D.  [phone] 8/15/2023 Ur[facility]     Narrative & Impression     8/15/2023 1:36 PM     HISTORY/REASON FOR EXAM: pain; Pain/Deformity Following Trauma        TECHNIQUE/EXAM DESCRIPTION AND NUMBER OF VIEWS: 3 nonweightbearing views of the LEFT ankle.     COMPARISON: None     FINDINGS:  There is mild lateral soft tissue swelling.     There is no acute displaced fracture. The ankle mortise is symmetric and there is no syndesmotic widening.     IMPRESSION:     Mild lateral soft tissue swelling           Exam Ended: 08/15/23  1:47 PM Last Resulted: 08/15/23  2:08 PM           Details    Reading Physician Reading Date Result Priority   Hood Montanez M.D.  [phone] 8/15/2023 Urgent Care     Narrative & Impression     8/15/2023 1:35 PM     HISTORY/REASON FOR EXAM:  Pain/Deformity Following Trauma.     TECHNIQUE/EXAM DESCRIPTION AND NUMBER OF VIEWS:  3 nonweightbearing views of the LEFT foot.     COMPARISON:  None.     FINDINGS:  No acute displaced fracture is noted.     There is no subluxation.     There is no unusual degeneration.     IMPRESSION:      No radiographic evidence of acute displaced fracture or subluxation.           Exam Ended: 08/15/23  1:47 PM Last Resulted: 08/15/23  2:07 PM              Assessment & Plan       1. Sprain of left ankle, unspecified ligament, initial encounter       X-rays were personally reviewed by myself.   There is no fracture or arthritic changes   noted.      rx motrin 800mg tid prn    Wtbearing as tolerated    Air cast splint applied      Follow up in one week if no improvement, sooner if symptoms worsen.        My total time spent caring for the patient on the day of the encounter was 40 minutes.   This does not include time spent on separately billable procedures/tests.

## 2023-08-15 NOTE — LETTER
August 15, 2023         Patient: Gildardo Moody   YOB: 1995   Date of Visit: 8/15/2023           To Whom it May Concern:    Gildardo Moody was seen in my clinic on 8/15/2023. He may return to work on Monday.    If you have any questions or concerns, please don't hesitate to call.        Sincerely,           Tye Newton M.D.  Electronically Signed

## 2023-09-25 ENCOUNTER — TELEMEDICINE (OUTPATIENT)
Dept: MEDICAL GROUP | Facility: PHYSICIAN GROUP | Age: 28
End: 2023-09-25
Payer: COMMERCIAL

## 2023-09-25 VITALS — WEIGHT: 165 LBS | HEIGHT: 64 IN | BODY MASS INDEX: 28.17 KG/M2 | RESPIRATION RATE: 20 BRPM

## 2023-09-25 DIAGNOSIS — J30.1 SEASONAL ALLERGIC RHINITIS DUE TO POLLEN: ICD-10-CM

## 2023-09-25 DIAGNOSIS — J45.41 MODERATE PERSISTENT ASTHMA WITH ACUTE EXACERBATION: ICD-10-CM

## 2023-09-25 DIAGNOSIS — Z00.00 WELL ADULT EXAM: ICD-10-CM

## 2023-09-25 DIAGNOSIS — F90.9 ATTENTION DEFICIT HYPERACTIVITY DISORDER (ADHD), UNSPECIFIED ADHD TYPE: Chronic | ICD-10-CM

## 2023-09-25 DIAGNOSIS — E78.5 DYSLIPIDEMIA: Chronic | ICD-10-CM

## 2023-09-25 DIAGNOSIS — R73.03 PREDIABETES: Chronic | ICD-10-CM

## 2023-09-25 PROCEDURE — 99214 OFFICE O/P EST MOD 30 MIN: CPT | Mod: 95 | Performed by: INTERNAL MEDICINE

## 2023-09-25 RX ORDER — MONTELUKAST SODIUM 10 MG/1
10 TABLET ORAL DAILY
Qty: 90 TABLET | Refills: 3 | Status: SHIPPED | OUTPATIENT
Start: 2023-09-25

## 2023-09-25 RX ORDER — METHYLPHENIDATE HYDROCHLORIDE 54 MG/1
54 TABLET ORAL EVERY MORNING
Qty: 30 TABLET | Refills: 0 | Status: SHIPPED | OUTPATIENT
Start: 2023-09-25 | End: 2023-10-26 | Stop reason: SDUPTHER

## 2023-09-26 NOTE — PROGRESS NOTES
This visit was conducted via  [employer] Virtual Visit using secure and encrypted videoconferencing technology.   The patient was at home in the state of [place]  The patient's identity was confirmed and verbal consent was obtained for this virtual visit.  -------------------------------------------------------------------------------    Chief Complaint   Patient presents with    Medication Refill      Follow-up ADHD condition  Prediabetes  Hyperlipidemia  Allergic rhinitis  Asthma      HPI: This is a 28 y.o. pt.  Pt's medical history is notable for:      Attention deficit hyperactivity disorder (ADHD)  Chronic condition.  The patient reported that he was off medication for about 6 months  In the last couple month the patient reported difficulty with attention span/focusing  The patient wishes to go back on taking Concerta 54 Mg daily.  Patient denies significant side effects while taking the medication previously.    Prediabetes  Chronic condition per the patient presently on diet therapy.  Lab test ordered for follow-up.    Dyslipidemia  Chronic condition.  Patient is currently on diet therapy.  Patient is due for blood test.    Allergic rhinitis  Chronic condition.  The patient is taking Zyrtec 10 mg daily.  He denies shortness of breath or wheezing.    Moderate persistent asthma with acute exacerbation  Chronic ongoing condition.  The patient takes singular 10 mg daily and he uses albuterol as needed sparingly.  The patient requests Rx refill for Singulair.         Allergies: Patient has no known allergies.    Current Outpatient Medications Ordered in Epic   Medication Sig Dispense Refill    montelukast (SINGULAIR) 10 MG Tab Take 1 Tablet by mouth every day. 90 Tablet 3    methylphenidate (CONCERTA) 54 MG CR tablet Take 1 Tablet by mouth every morning for 30 days. 30 Tablet 0    albuterol 108 (90 Base) MCG/ACT Aero Soln inhalation aerosol Inhale 2 Puffs every 6 hours as needed for Shortness of Breath. 8.5 g 6     "Cetirizine HCl (ZYRTEC ALLERGY PO) Take  by mouth 1 time a day as needed.       No current Epic-ordered facility-administered medications on file.       Past Medical History:   Diagnosis Date    ADHD (attention deficit hyperactivity disorder)     Allergy     ASTHMA     Depression     Shortness of breath        Past Surgical History:   Procedure Laterality Date    SHOULDER ARTHROSCOPY W/ SLAP / LABRAL REPAIR  3/13/2014    Performed by Fabián Rosenberg M.D. at SURGERY [facility]    OTHER  2014    left shoulder dislocation put back in place    OTHER      detached retina OU       Family History   Family history unknown: Yes       Social History     Tobacco Use   Smoking Status Former    Current packs/day: 0.00    Types: Cigarettes    Quit date: 9/10/2017    Years since quittin.0    Passive exposure: Never   Smokeless Tobacco Never   Tobacco Comments    Social . Once a month       Social History     Substance and Sexual Activity   Alcohol Use Yes    Comment: occ/social         Review of systems:  As per HPI above. All other systems reviewed and negative.      Past Medical, Social, and Family history reviewed and updated in EPIC    ------------------------------------------------------------------    Vitals:    23 170   Resp: 20     Vitals:    23   Weight: 74.8 kg (165 lb)   Height: 1.626 m (5' 4\")        PHYSICAL EXAM:   Psych:  A&O x 3, mood and affect appropriate  Constitutional: no distress  Skin: No apparent rashes  ENMT: Lips without lesions. Phonation normal.  Respiratory: Unlabored respiratory effort        Lab Results   Component Value Date/Time    HBA1C 5.6 2022 11:55 AM    HBA1C 5.7 (H) 2021 10:13 AM       Lab Results   Component Value Date/Time    WBC 5.7 2021 10:13 AM    HEMOGLOBIN 16.6 2021 10:13 AM    HEMATOCRIT 50.1 2021 10:13 AM    MCV 88.0 2021 10:13 AM    PLATELETCT 236 2021 10:13 AM         Lab Results   Component Value Date/Time "    SODIUM 138 04/19/2022 11:55 AM    POTASSIUM 4.4 04/19/2022 11:55 AM    GLUCOSE 105 (H) 04/19/2022 11:55 AM    BUN 11 04/19/2022 11:55 AM    CREATININE 0.73 04/19/2022 11:55 AM       Lab Results   Component Value Date/Time    CHOLSTRLTOT 170 04/19/2022 11:55 AM     (H) 04/19/2022 11:55 AM    HDL 42 04/19/2022 11:55 AM    TRIGLYCERIDE 119 04/19/2022 11:55 AM       Lab Results   Component Value Date/Time    ALTSGPT 28 06/08/2021 10:13 AM           ---------------------------------------------------------------------    ASSESSMENT and PLAN:     1. Attention deficit hyperactivity disorder (ADHD), unspecified ADHD type  Chronic condition.  Uncontrolled.  Recommend patient to resume Concerta 34 Mg daily.  Potential side effect of medication discussed with the patient.  Advised the patient to come to the office in the next few days to sign the consent form  Patient voiced understanding and agree.  - methylphenidate (CONCERTA) 54 MG CR tablet; Take 1 Tablet by mouth every morning for 30 days.  Dispense: 30 Tablet; Refill: 0  - Consent for Opiate Prescription  In prescribing controlled substances to this patient, I certify that I have obtained and reviewed the medical history of the patient. I have also made a good coty effort to obtain applicable records from other providers who have treated the patient.     I have reviewed patient's prescription history as maintained by the [place] Prescription Monitoring Program.      I have reviewed the medical records and have determined that a controlled substance treatment is medically indicated. I believe the benefits of controlled substance therapy outweigh the risks.      I have discussed the risks and benefits of treatment plan, and alternative therapies with the patient.  Also discussed with the patient regarding potential drug interactions with other medications.      2. Prediabetes  Chronic condition.  Current status unclear.  Lab test ordered for follow-up.  Recommend  low sweet low-carb diet.    3. Dyslipidemia  Chronic condition.  Current status unclear.  Lipid panel requested.  Continue with low-fat low-cholesterol diet and regular exercise activity.    4. Moderate persistent asthma with acute exacerbation  - montelukast (SINGULAIR) 10 MG Tab; Take 1 Tablet by mouth every day.  Dispense: 90 Tablet; Refill: 3  Chronic condition.  Continue Singulair 10 mg daily.  Patient may use albuterol as needed.      5. Well adult exam  I did order routine lab.  Advised the patient to follow-up few days after lab test done.  - HEMOGLOBIN A1C; Future  - ALANINE AMINO-TRANS; Future  - Basic Metabolic Panel; Future  - CBC WITHOUT DIFFERENTIAL; Future  - HEP C VIRUS ANTIBODY; Future  - Lipid Profile; Future  - VITAMIN D,25 HYDROXY (DEFICIENCY); Future  - MICROALBUMIN CREAT RATIO URINE; Future    6. Seasonal allergic rhinitis due to pollen  Chronic condition.  Continue to use Zyrtec 10 mg daily.               Please note that this dictation was created using voice recognition software. I have made every reasonable attempt to correct obvious errors, but I expect that there are errors of grammar and possibly content that I did not discover before finalizing the note.       Cory Whiteside MD  Internal Medicine  [facility]

## 2023-09-26 NOTE — ASSESSMENT & PLAN NOTE
Chronic condition.  The patient reported that he was off medication for about 6 months  In the last couple month the patient reported difficulty with attention span/focusing  The patient wishes to go back on taking Concerta 54 Mg daily.  Patient denies significant side effects while taking the medication previously.

## 2023-09-26 NOTE — ASSESSMENT & PLAN NOTE
Chronic ongoing condition.  The patient takes singular 10 mg daily and he uses albuterol as needed sparingly.  The patient requests Rx refill for Singulair.

## 2023-09-26 NOTE — ASSESSMENT & PLAN NOTE
Chronic condition.  The patient is taking Zyrtec 10 mg daily.  He denies shortness of breath or wheezing.

## 2023-10-02 ENCOUNTER — HOSPITAL ENCOUNTER (OUTPATIENT)
Dept: LAB | Facility: MEDICAL CENTER | Age: 28
End: 2023-10-02
Attending: INTERNAL MEDICINE
Payer: COMMERCIAL

## 2023-10-02 DIAGNOSIS — Z00.00 WELL ADULT EXAM: ICD-10-CM

## 2023-10-02 LAB
25(OH)D3 SERPL-MCNC: 20 NG/ML (ref 30–100)
ALT SERPL-CCNC: 24 U/L (ref 2–50)
ANION GAP SERPL CALC-SCNC: 11 MMOL/L (ref 7–16)
BUN SERPL-MCNC: 12 MG/DL (ref 8–22)
CALCIUM SERPL-MCNC: 9.3 MG/DL (ref 8.5–10.5)
CHLORIDE SERPL-SCNC: 103 MMOL/L (ref 96–112)
CHOLEST SERPL-MCNC: 205 MG/DL (ref 100–199)
CO2 SERPL-SCNC: 25 MMOL/L (ref 20–33)
CREAT SERPL-MCNC: 0.91 MG/DL (ref 0.5–1.4)
CREAT UR-MCNC: 261.06 MG/DL
ERYTHROCYTE [DISTWIDTH] IN BLOOD BY AUTOMATED COUNT: 37.1 FL (ref 35.9–50)
EST. AVERAGE GLUCOSE BLD GHB EST-MCNC: 120 MG/DL
GFR SERPLBLD CREATININE-BSD FMLA CKD-EPI: 117 ML/MIN/1.73 M 2
GLUCOSE SERPL-MCNC: 110 MG/DL (ref 65–99)
HBA1C MFR BLD: 5.8 % (ref 4–5.6)
HCT VFR BLD AUTO: 50.8 % (ref 42–52)
HCV AB SER QL: NORMAL
HDLC SERPL-MCNC: 44 MG/DL
HGB BLD-MCNC: 17.8 G/DL (ref 14–18)
LDLC SERPL CALC-MCNC: 132 MG/DL
MCH RBC QN AUTO: 30 PG (ref 27–33)
MCHC RBC AUTO-ENTMCNC: 35 G/DL (ref 32.3–36.5)
MCV RBC AUTO: 85.7 FL (ref 81.4–97.8)
MICROALBUMIN UR-MCNC: <1.2 MG/DL
MICROALBUMIN/CREAT UR: NORMAL MG/G (ref 0–30)
PLATELET # BLD AUTO: 253 K/UL (ref 164–446)
PMV BLD AUTO: 10.4 FL (ref 9–12.9)
POTASSIUM SERPL-SCNC: 4.1 MMOL/L (ref 3.6–5.5)
RBC # BLD AUTO: 5.93 M/UL (ref 4.7–6.1)
SODIUM SERPL-SCNC: 139 MMOL/L (ref 135–145)
TRIGL SERPL-MCNC: 144 MG/DL (ref 0–149)
WBC # BLD AUTO: 5.2 K/UL (ref 4.8–10.8)

## 2023-10-02 PROCEDURE — 36415 COLL VENOUS BLD VENIPUNCTURE: CPT

## 2023-10-02 PROCEDURE — 84460 ALANINE AMINO (ALT) (SGPT): CPT

## 2023-10-02 PROCEDURE — 80048 BASIC METABOLIC PNL TOTAL CA: CPT

## 2023-10-02 PROCEDURE — 86803 HEPATITIS C AB TEST: CPT

## 2023-10-02 PROCEDURE — 83036 HEMOGLOBIN GLYCOSYLATED A1C: CPT

## 2023-10-02 PROCEDURE — 85027 COMPLETE CBC AUTOMATED: CPT

## 2023-10-02 PROCEDURE — 80061 LIPID PANEL: CPT

## 2023-10-02 PROCEDURE — 82306 VITAMIN D 25 HYDROXY: CPT

## 2023-10-02 PROCEDURE — 82570 ASSAY OF URINE CREATININE: CPT

## 2023-10-02 PROCEDURE — 82043 UR ALBUMIN QUANTITATIVE: CPT

## 2023-10-03 PROBLEM — E55.9 VITAMIN D DEFICIENCY: Status: ACTIVE | Noted: 2023-10-03

## 2023-10-25 ENCOUNTER — PATIENT MESSAGE (OUTPATIENT)
Dept: MEDICAL GROUP | Facility: PHYSICIAN GROUP | Age: 28
End: 2023-10-25
Payer: COMMERCIAL

## 2023-10-25 DIAGNOSIS — F90.9 ATTENTION DEFICIT HYPERACTIVITY DISORDER (ADHD), UNSPECIFIED ADHD TYPE: Chronic | ICD-10-CM

## 2023-10-26 RX ORDER — METHYLPHENIDATE HYDROCHLORIDE 54 MG/1
54 TABLET ORAL EVERY MORNING
Qty: 30 TABLET | Refills: 0 | Status: SHIPPED | OUTPATIENT
Start: 2023-10-26 | End: 2023-10-30 | Stop reason: SDUPTHER

## 2023-10-30 DIAGNOSIS — F90.9 ATTENTION DEFICIT HYPERACTIVITY DISORDER (ADHD), UNSPECIFIED ADHD TYPE: Chronic | ICD-10-CM

## 2023-10-30 RX ORDER — METHYLPHENIDATE HYDROCHLORIDE 54 MG/1
54 TABLET ORAL EVERY MORNING
Qty: 30 TABLET | Refills: 0 | Status: SHIPPED | OUTPATIENT
Start: 2023-10-30 | End: 2023-12-12 | Stop reason: SDUPTHER

## 2023-10-30 NOTE — TELEPHONE ENCOUNTER
Received request via: Patient    Was the patient seen in the last year in this department? Yes    Does the patient have an active prescription (recently filled or refills available) for medication(s) requested? Yes. Pharamacy change    Does the patient have retirement Plus and need 100 day supply (blood pressure, diabetes and cholesterol meds only)? Patient does not have SCP

## 2023-10-30 NOTE — TELEPHONE ENCOUNTER
I went to [facility] today and they said my Concerta was out of stock. Can you please transfer my medicine over to the [facility] in [place]?

## 2023-12-10 ENCOUNTER — OFFICE VISIT (OUTPATIENT)
Dept: URGENT CARE | Facility: PHYSICIAN GROUP | Age: 28
End: 2023-12-10
Payer: COMMERCIAL

## 2023-12-10 VITALS
TEMPERATURE: 98.6 F | HEIGHT: 64 IN | RESPIRATION RATE: 16 BRPM | BODY MASS INDEX: 28.68 KG/M2 | SYSTOLIC BLOOD PRESSURE: 100 MMHG | WEIGHT: 168 LBS | HEART RATE: 85 BPM | DIASTOLIC BLOOD PRESSURE: 78 MMHG | OXYGEN SATURATION: 97 %

## 2023-12-10 DIAGNOSIS — J02.9 PHARYNGITIS, UNSPECIFIED ETIOLOGY: ICD-10-CM

## 2023-12-10 DIAGNOSIS — J06.9 VIRAL URI WITH COUGH: ICD-10-CM

## 2023-12-10 DIAGNOSIS — R09.81 NASAL CONGESTION: Primary | ICD-10-CM

## 2023-12-10 PROCEDURE — 99213 OFFICE O/P EST LOW 20 MIN: CPT

## 2023-12-10 PROCEDURE — 0241U POCT CEPHEID COV-2, FLU A/B, RSV - PCR: CPT

## 2023-12-10 PROCEDURE — 87651 STREP A DNA AMP PROBE: CPT

## 2023-12-10 PROCEDURE — 3074F SYST BP LT 130 MM HG: CPT

## 2023-12-10 PROCEDURE — 3078F DIAST BP <80 MM HG: CPT

## 2023-12-10 RX ORDER — FLUTICASONE PROPIONATE 50 MCG
1 SPRAY, SUSPENSION (ML) NASAL DAILY
Qty: 16 G | Refills: 0 | Status: SHIPPED | OUTPATIENT
Start: 2023-12-10

## 2023-12-10 ASSESSMENT — ENCOUNTER SYMPTOMS
EYE PAIN: 0
PALPITATIONS: 0
EYE REDNESS: 0
SORE THROAT: 1
DIZZINESS: 0
EYE DISCHARGE: 0
FEVER: 1
CHILLS: 1
ABDOMINAL PAIN: 0
DIARRHEA: 0
MYALGIAS: 1
NAUSEA: 0
SPUTUM PRODUCTION: 1
VOMITING: 0
WHEEZING: 0
COUGH: 1
NECK PAIN: 0
HEADACHES: 1
SHORTNESS OF BREATH: 0

## 2023-12-10 NOTE — PROGRESS NOTES
Subjective:   Gildardo Moody is a 28 y.o. male who presents for Nasal Congestion (X 3 days with sinus congestion, mild sore throat, cough, nausea, headache, body aches.  Negative home covid test.  Hx of Asthma. )          I introduced myself to the patient and informed them that I am a family nurse practitioner.    HPI:Stephen comes in today c/o nasal congestion, cough, sore throat. Onset was 3 days ago. Patient describes symptoms as constant. They describe the cough as productive with yellow mucous. Describes throat pain as sharp scratchy. Aggravating factors include eating, drinking, swallowing. Relieving factors include warm tea. Treatments tried at home include  Muccinex D, Dayquil with little effect. They describe their symptoms as moderate. Denies any  known exposure to Covid, strep or flu.  Vaccinated x 2 against COVID, no flu shot this year.      Review of Systems   Constitutional:  Positive for chills, fever and malaise/fatigue.   HENT:  Positive for congestion, ear pain and sore throat.    Eyes:  Negative for pain, discharge and redness.   Respiratory:  Positive for cough and sputum production. Negative for shortness of breath and wheezing.    Cardiovascular:  Negative for chest pain and palpitations.   Gastrointestinal:  Negative for abdominal pain, diarrhea, nausea and vomiting.   Genitourinary:  Negative for dysuria.   Musculoskeletal:  Positive for myalgias. Negative for neck pain.   Skin:  Negative for rash.   Neurological:  Positive for headaches. Negative for dizziness.       Medications: albuterol Aers  methylphenidate  montelukast Tabs  ZYRTEC ALLERGY PO    Allergies: Patient has no known allergies.    Problem List: does not have any pertinent problems on file.    Surgical History:  Past Surgical History:   Procedure Laterality Date    SHOULDER ARTHROSCOPY W/ SLAP / LABRAL REPAIR  3/13/2014    Performed by Fabián Rosenberg M.D. at [facility] ORS    OTHER  1/2014    left shoulder  "dislocation put back in place    OTHER  1994    detached retina OU       Past Social Hx:   reports that he quit smoking about 6 years ago. His smoking use included cigarettes. He has never been exposed to tobacco smoke. He has never used smokeless tobacco. He reports current alcohol use. He reports that he does not currently use drugs after having used the following drugs: Cocaine.     Past Family Hx:   Family history is unknown by patient.     Problem list, medications, and allergies reviewed by myself today in Epic.   I have documented what I find to be significant in regards to past medical, social, family and surgical history  in my HPI or under PMH/PSH/FH review section, otherwise it is noncontributory     Objective:     /78   Pulse 85   Temp 37 °C (98.6 °F) (Temporal)   Resp 16   Ht 1.626 m (5' 4\")   Wt 76.2 kg (168 lb)   SpO2 97%   BMI 28.84 kg/m²     During this visit, appropriate PPE was worn, and hand hygiene was performed.    Physical Exam  Vitals reviewed.   Constitutional:       General: He is not in acute distress.     Appearance: Normal appearance. He is not ill-appearing or toxic-appearing.   HENT:      Head: Normocephalic and atraumatic.      Right Ear: Tympanic membrane, ear canal and external ear normal. There is no impacted cerumen.      Left Ear: Tympanic membrane, ear canal and external ear normal. There is no impacted cerumen.      Nose: Congestion and rhinorrhea present.      Mouth/Throat:      Pharynx: Oropharynx is clear. Uvula midline. Posterior oropharyngeal erythema present. No oropharyngeal exudate or uvula swelling.      Tonsils: No tonsillar exudate or tonsillar abscesses. 1+ on the right. 1+ on the left.      Comments: Tonsillar swelling 1+ bilaterall with erythema, no exudates.  There is some posterior oropharynx erythema present, no exudates or cobblestoning.  No soft tissue swelling of the sublingual mucosa, no petechia or swelling of the soft or hard palate, no " unilarteral oropharynx swelling, no sign of tonsillar stone, epiglottitis, or abscess.  Airway is patent and there is no stridor.  Patient is managing oral secretions appropriately.  Uvula is midline and appropriate size with no erythema or edema.    Eyes:      General: No scleral icterus.        Right eye: No discharge.         Left eye: No discharge.      Conjunctiva/sclera: Conjunctivae normal.      Pupils: Pupils are equal, round, and reactive to light.   Cardiovascular:      Rate and Rhythm: Normal rate and regular rhythm.      Heart sounds: Normal heart sounds. No murmur heard.     No friction rub. No gallop.   Pulmonary:      Effort: Pulmonary effort is normal. No respiratory distress.      Breath sounds: Normal breath sounds. No wheezing, rhonchi or rales.   Abdominal:      General: There is no distension.   Musculoskeletal:         General: Normal range of motion.      Cervical back: Normal range of motion. No rigidity.      Right lower leg: No edema.      Left lower leg: No edema.   Lymphadenopathy:      Cervical: No cervical adenopathy.   Skin:     General: Skin is warm and dry.      Coloration: Skin is not jaundiced.   Neurological:      Mental Status: He is alert and oriented to person, place, and time. Mental status is at baseline.   Psychiatric:         Mood and Affect: Mood normal.         Behavior: Behavior normal.         Lab Results/POC Test Results   Results for orders placed or performed in visit on 12/10/23   POCT CEPHEID GROUP A STREP - PCR   Result Value Ref Range    POC Group A Strep, PCR Not Detected Not Detected, Invalid   POCT CEPHEID COV-2, FLU A/B, RSV - PCR   Result Value Ref Range    SARS-CoV-2 by PCR Negative Negative, Invalid    Influenza virus A RNA Negative Negative, Invalid    Influenza virus B, PCR Negative Negative, Invalid    RSV, PCR Negative Negative, Invalid           Assessment/Plan:     Diagnosis and associated orders:     1. Nasal congestion  POCT CEPHEID COV-2, FLU A/B,  RSV - PCR    fluticasone (FLONASE) 50 MCG/ACT nasal spray      2. Viral URI with cough  POCT CEPHEID COV-2, FLU A/B, RSV - PCR      3. Pharyngitis, unspecified etiology  POCT CEPHEID GROUP A STREP - PCR    POCT CEPHEID COV-2, FLU A/B, RSV - PCR         Comments/MDM:     1. Viral URI with cough  We discussed viral versus bacterial infection, and I informed patient that they have a self-limiting viral URI at this time and antibiotics are not an effective treatment for this.    I instructed them that the management for this is supportive care-we discussed cough/deep breathing exercises, drink plenty of fluids, get plenty of rest, try a humidifier in bedroom at night to help them sleep, gargle with salt water/honey/OTC Cepacol lozenges to help ease sore throat.    I instr patient they may use OTC cold and flu meds to treat symptoms  (caution regarding checking ingredients as some meds contain tylenol); may use tylenol alternated with ibuprofen for pain/fever - may take tylenol 1000mg every 6 hours, do not exceed 3000 mg in 24 hours; ibuprofen (if not contraindicated) start with lowest effective dose, 200mg every 8 hours, may increase to up to 400 mg every 8 hours if needed, take with food.  Patient was instructed that they should feel better in 7-10 days, (but some viral illnesses can last 2 weeks or longer, with residual cough possible for over a month) but to return to clinic if symptoms do not improve or worsen after 10-14 days.   I offered him medication to suppress the cough, he states that he does not think he really needs it, will continue to take DayQuil.  We did discuss red flags and when to return to urgent care versus when to go to the emergency room and strict ER precautions were given.   Patient was encouraged to get a pharmacy consult when picking up any medication's.   I did print written instructions for patient, and did go over the diagnosis including differentials, and side effects of each medication  with them and answer their questions to the best of my ability.   Patient states they have good understanding of instructions, and are agreeable with the plan of care.    - POCT CEPHEID COV-2, FLU A/B, RSV - PCR    2. Pharyngitis, unspecified etiology  I did offer patient a dose of Decadron in clinic today to help with the sore throat, he states that he does not think it is that bad, declines.  - POCT CEPHEID GROUP A STREP - PCR  - POCT CEPHEID COV-2, FLU A/B, RSV - PCR    3. Nasal congestion  - POCT CEPHEID COV-2, FLU A/B, RSV - PCR  - fluticasone (FLONASE) 50 MCG/ACT nasal spray; Administer 1 Spray into affected nostril(S) every day.  Dispense: 16 g; Refill: 0         Pt is clinically stable at today's acute urgent care visit. Vital signs are normal and reassuring.  No acute distress noted. Appropriate for outpatient management at this time.       Discussed DDx, management options (risks,benefits, and alternatives to planned treatment), natural progression and supportive care.  Patient states they have good understanding and the treatment plan was agreed upon. Questions were encouraged and answered   Return to urgent care prn if new or worsening sx or if there is no improvement in condition prn.    Advised the patient to follow-up with the primary care physician for recheck, reevaluation, and consideration of further management.  I instructed patient to get a pharmacy consult when picking up any prescribed medications.  Strict ER precautions discussed for any  chest pain, difficulty breathing, difficulty swallowing, wheezing, stridor, or drooling, fever that does not respond to ibuprofen and Tylenol, inability to tolerate fluids, dehydration, lethargy.   Patient states they understand all instructions.     I personally reviewed prior external notes and test results pertinent to today's visit.  I have independently reviewed and interpreted all diagnostics ordered during this urgent care acute visit.        Please  note that this dictation was created using voice recognition software. I have made a reasonable attempt to correct obvious errors, but I expect that there are errors of grammar and possibly content that I did not discover before finalizing the note.    This note was electronically signed by Tye ALVES, JASON, JEREMY, PRAVIN

## 2023-12-12 DIAGNOSIS — F90.9 ATTENTION DEFICIT HYPERACTIVITY DISORDER (ADHD), UNSPECIFIED ADHD TYPE: Chronic | ICD-10-CM

## 2023-12-12 RX ORDER — METHYLPHENIDATE HYDROCHLORIDE 54 MG/1
54 TABLET ORAL EVERY MORNING
Qty: 30 TABLET | Refills: 0 | Status: SHIPPED | OUTPATIENT
Start: 2023-12-12 | End: 2024-01-17 | Stop reason: SDUPTHER

## 2023-12-15 ENCOUNTER — OFFICE VISIT (OUTPATIENT)
Dept: URGENT CARE | Facility: PHYSICIAN GROUP | Age: 28
End: 2023-12-15
Payer: COMMERCIAL

## 2023-12-15 VITALS
BODY MASS INDEX: 29.19 KG/M2 | HEART RATE: 89 BPM | RESPIRATION RATE: 16 BRPM | HEIGHT: 64 IN | OXYGEN SATURATION: 94 % | SYSTOLIC BLOOD PRESSURE: 110 MMHG | TEMPERATURE: 97.2 F | DIASTOLIC BLOOD PRESSURE: 82 MMHG | WEIGHT: 171 LBS

## 2023-12-15 DIAGNOSIS — J01.90 ACUTE BACTERIAL SINUSITIS: ICD-10-CM

## 2023-12-15 DIAGNOSIS — B96.89 ACUTE BACTERIAL SINUSITIS: ICD-10-CM

## 2023-12-15 DIAGNOSIS — J45.41 MODERATE PERSISTENT ASTHMA WITH EXACERBATION: ICD-10-CM

## 2023-12-15 PROCEDURE — 3074F SYST BP LT 130 MM HG: CPT | Performed by: FAMILY MEDICINE

## 2023-12-15 PROCEDURE — 3079F DIAST BP 80-89 MM HG: CPT | Performed by: FAMILY MEDICINE

## 2023-12-15 PROCEDURE — 99214 OFFICE O/P EST MOD 30 MIN: CPT | Performed by: FAMILY MEDICINE

## 2023-12-15 RX ORDER — PREDNISONE 20 MG/1
40 TABLET ORAL DAILY
Qty: 10 TABLET | Refills: 0 | Status: SHIPPED | OUTPATIENT
Start: 2023-12-15 | End: 2023-12-20

## 2023-12-15 RX ORDER — AMOXICILLIN AND CLAVULANATE POTASSIUM 875; 125 MG/1; MG/1
TABLET, FILM COATED ORAL
Qty: 20 TABLET | Refills: 0 | Status: SHIPPED | OUTPATIENT
Start: 2023-12-15 | End: 2023-12-25

## 2023-12-15 RX ORDER — ALBUTEROL SULFATE 90 UG/1
AEROSOL, METERED RESPIRATORY (INHALATION)
Qty: 8.5 G | Refills: 2 | Status: SHIPPED | OUTPATIENT
Start: 2023-12-15 | End: 2024-02-23 | Stop reason: SDUPTHER

## 2023-12-15 NOTE — LETTER
December 15, 2023         Patient: Gildardo Moody   YOB: 1995   Date of Visit: 12/15/2023           To Whom it May Concern:    Gildardo Moody was seen in my clinic on 12/15/2023.    Please excuse from work for 12/15/23 due to medical condition.     If you have any questions or concerns, please don't hesitate to call.        Sincerely,           Aureliano Chaidez M.D.  Electronically Signed

## 2023-12-16 NOTE — PROGRESS NOTES
Chief Complaint:    Chief Complaint   Patient presents with    Cough     X 9 days with congestion, wheezing, diarrhea (yesterday), sore throat, body aches.  Hx of Asthma.  Pt. Was seen 12-10-23 for Nasal congestion.        History of Present Illness:    Symptoms present now for 9 days. Saw other provider on 12/10/23, had negative Cepheid PCR tests for Covid, Flu, RSV, and Strep, was prescribed Flonase to use, visit reviewed. He is worse from last visit on 12/10/23. Still having pressure in all sinus regions, nasal symptoms with purulent mucus from nose, cough, and chest congestion. Has Asthma, needs refill of Albuterol inhaler. He has needed oral steroids for Asthma in the past.      Past Medical History:    Past Medical History:   Diagnosis Date    ADHD (attention deficit hyperactivity disorder)     Allergy     ASTHMA     Depression     Shortness of breath      Past Surgical History:    Past Surgical History:   Procedure Laterality Date    SHOULDER ARTHROSCOPY W/ SLAP / LABRAL REPAIR  3/13/2014    Performed by Fabián Rosenberg M.D. at [facility]    OTHER  2014    left shoulder dislocation put back in place    OTHER      detached retina OU     Social History:    Social History     Socioeconomic History    Marital status: Single     Spouse name: Not on file    Number of children: Not on file    Years of education: Not on file    Highest education level: Not on file   Occupational History    Not on file   Tobacco Use    Smoking status: Former     Current packs/day: 0.00     Types: Cigarettes     Quit date: 9/10/2017     Years since quittin.2     Passive exposure: Never    Smokeless tobacco: Never    Tobacco comments:     Social . Once a month   Vaping Use    Vaping Use: Never used   Substance and Sexual Activity    Alcohol use: Yes     Comment: occ/social    Drug use: Not Currently     Types: Cocaine    Sexual activity: Yes     Partners: Female   Other Topics Concern    Not on file   Social History  "Narrative    Not on file     Social Determinants of Health     Financial Resource Strain: Not on file   Food Insecurity: Not on file   Transportation Needs: Not on file   Physical Activity: Not on file   Stress: Not on file   Social Connections: Not on file   Intimate Partner Violence: Not on file   Housing Stability: Not on file     Family History:    Family History   Family history unknown: Yes     Medications:    Current Outpatient Medications on File Prior to Visit   Medication Sig Dispense Refill    methylphenidate (CONCERTA) 54 MG CR tablet Take 1 Tablet by mouth every morning for 90 days. 30 Tablet 0    fluticasone (FLONASE) 50 MCG/ACT nasal spray Administer 1 Spray into affected nostril(S) every day. 16 g 0    montelukast (SINGULAIR) 10 MG Tab Take 1 Tablet by mouth every day. 90 Tablet 3    albuterol 108 (90 Base) MCG/ACT Aero Soln inhalation aerosol Inhale 2 Puffs every 6 hours as needed for Shortness of Breath. 8.5 g 6    Cetirizine HCl (ZYRTEC ALLERGY PO) Take  by mouth 1 time a day as needed.       No current facility-administered medications on file prior to visit.     Allergies:    No Known Allergies      Vitals:    Vitals:    12/15/23 1817   BP: 110/82   Pulse: 89   Resp: 16   Temp: 36.2 °C (97.2 °F)   TempSrc: Temporal   SpO2: 94%   Weight: 77.6 kg (171 lb)   Height: 1.626 m (5' 4\")       Physical Exam:    Constitutional: Vital signs reviewed. Appears well-developed and well-nourished. Occl cough. No acute distress.   Eyes: Sclera white, conjunctivae clear.   ENT: External ears normal. External auditory canals normal without discharge. TMs translucent and non-bulging. Hearing normal. Lips/teeth are normal. Oral mucosa pink and moist. Posterior pharynx: WNL.  Neck: Neck supple.   Cardiovascular: Regular rate and rhythm. No murmur.  Pulmonary/Chest: Respirations non-labored. Mild diffuse coarse breath sounds and mild wheezing bilaterally.  Musculoskeletal: Normal gait. No muscular atrophy or " weakness.  Neurological: Alert and oriented to person, place, and time. Muscle tone normal. Coordination normal.   Skin: No rashes or lesions. Warm, dry, normal turgor.  Psychiatric: Normal mood and affect. Behavior is normal. Judgment and thought content normal.       Assessment / Plan & Medical Decision Makin. Acute bacterial sinusitis  - amoxicillin-clavulanate (AUGMENTIN) 875-125 MG Tab; 1 TAB BY MOUTH TWICE A DAY X 10 DAYS. TAKE WITH FOOD.  Dispense: 20 Tablet; Refill: 0    2. Moderate persistent asthma with exacerbation  - predniSONE (DELTASONE) 20 MG Tab; Take 2 Tablets by mouth every day for 5 days.  Dispense: 10 Tablet; Refill: 0  - albuterol 108 (90 Base) MCG/ACT Aero Soln inhalation aerosol; 2 PUFFS EVERY 4 HOURS ONLY IF NEEDED FOR COUGH, WHEEZING, OR SHORTNESS OF BREATH.  Dispense: 8.5 g; Refill: 2       Work note given - excuse for 12/15/23    Discussed with him DDX, management options, and risks, benefits, and alternatives to treatment plan agreed upon.    Symptoms present now for 9 days. Saw other provider on 12/10/23, had negative CepMax-Vizid PCR tests for Covid, Flu, RSV, and Strep, was prescribed Flonase to use, visit reviewed. He is worse from last visit on 12/10/23. Still having pressure in all sinus regions, nasal symptoms with purulent mucus from nose, cough, and chest congestion. Has Asthma, needs refill of Albuterol inhaler. He has needed oral steroids for Asthma in the past.    Occl cough. Mild diffuse coarse breath sounds and mild wheezing bilaterally.    Complicated condition due to worsening symptoms despite treatment with other provider on 12/10/23.    Asthma - chronic condition with acute exacerbation.     Agreeable to medications prescribed.    Discussed expected course of duration, time for improvement, and to seek follow-up in Emergency Room, urgent care, or with PCP if getting worse at any time or not improving within expected time frame.

## 2024-01-17 ENCOUNTER — OFFICE VISIT (OUTPATIENT)
Dept: MEDICAL GROUP | Facility: PHYSICIAN GROUP | Age: 29
End: 2024-01-17
Payer: COMMERCIAL

## 2024-01-17 VITALS
DIASTOLIC BLOOD PRESSURE: 82 MMHG | WEIGHT: 167.6 LBS | OXYGEN SATURATION: 95 % | SYSTOLIC BLOOD PRESSURE: 110 MMHG | BODY MASS INDEX: 28.61 KG/M2 | HEART RATE: 90 BPM | TEMPERATURE: 97.8 F | HEIGHT: 64 IN

## 2024-01-17 DIAGNOSIS — R05.1 ACUTE COUGH: ICD-10-CM

## 2024-01-17 DIAGNOSIS — J30.1 SEASONAL ALLERGIC RHINITIS DUE TO POLLEN: ICD-10-CM

## 2024-01-17 DIAGNOSIS — F90.9 ATTENTION DEFICIT HYPERACTIVITY DISORDER (ADHD), UNSPECIFIED ADHD TYPE: Chronic | ICD-10-CM

## 2024-01-17 DIAGNOSIS — R73.03 PREDIABETES: Chronic | ICD-10-CM

## 2024-01-17 DIAGNOSIS — Z23 NEED FOR VACCINATION: ICD-10-CM

## 2024-01-17 DIAGNOSIS — J45.41 MODERATE PERSISTENT ASTHMA WITH ACUTE EXACERBATION: ICD-10-CM

## 2024-01-17 DIAGNOSIS — E55.9 VITAMIN D DEFICIENCY: ICD-10-CM

## 2024-01-17 DIAGNOSIS — E78.5 DYSLIPIDEMIA: Chronic | ICD-10-CM

## 2024-01-17 PROCEDURE — 90686 IIV4 VACC NO PRSV 0.5 ML IM: CPT | Performed by: INTERNAL MEDICINE

## 2024-01-17 PROCEDURE — 99215 OFFICE O/P EST HI 40 MIN: CPT | Mod: 25 | Performed by: INTERNAL MEDICINE

## 2024-01-17 PROCEDURE — 90471 IMMUNIZATION ADMIN: CPT | Performed by: INTERNAL MEDICINE

## 2024-01-17 PROCEDURE — 3079F DIAST BP 80-89 MM HG: CPT | Performed by: INTERNAL MEDICINE

## 2024-01-17 PROCEDURE — 90677 PCV20 VACCINE IM: CPT | Performed by: INTERNAL MEDICINE

## 2024-01-17 PROCEDURE — 90472 IMMUNIZATION ADMIN EACH ADD: CPT | Performed by: INTERNAL MEDICINE

## 2024-01-17 PROCEDURE — 3074F SYST BP LT 130 MM HG: CPT | Performed by: INTERNAL MEDICINE

## 2024-01-17 RX ORDER — METHYLPHENIDATE HYDROCHLORIDE 54 MG/1
54 TABLET ORAL EVERY MORNING
Qty: 30 TABLET | Refills: 0 | Status: SHIPPED | OUTPATIENT
Start: 2024-01-17 | End: 2024-02-21 | Stop reason: SDUPTHER

## 2024-01-17 RX ORDER — FLUTICASONE PROPIONATE AND SALMETEROL 250; 50 UG/1; UG/1
1 POWDER RESPIRATORY (INHALATION) EVERY 12 HOURS
Qty: 1 EACH | Refills: 6 | Status: SHIPPED | OUTPATIENT
Start: 2024-01-17

## 2024-01-17 RX ORDER — METHYLPREDNISOLONE 4 MG/1
TABLET ORAL
Qty: 21 TABLET | Refills: 0 | Status: SHIPPED | OUTPATIENT
Start: 2024-01-17

## 2024-01-17 ASSESSMENT — PATIENT HEALTH QUESTIONNAIRE - PHQ9: CLINICAL INTERPRETATION OF PHQ2 SCORE: 0

## 2024-01-17 NOTE — ASSESSMENT & PLAN NOTE
Chronic condition.  The patient being treated with Concerta 54 Mg daily.  Patient Toller medication well.  Symptoms are well-controlled.  The patient denies any side effect.  Patient is requesting Rx refill    In prescribing controlled substances to this patient, I certify that I have obtained and reviewed the medical history of the patient. I have also made a good coty effort to obtain applicable records from other providers who have treated the patient.     I have reviewed patient's prescription history as maintained by the [place] Prescription Monitoring Program.      I have reviewed the medical records and have determined that a controlled substance treatment is medically indicated. I believe the benefits of controlled substance therapy outweigh the risks.      I have discussed the risks and benefits of treatment plan, and alternative therapies with the patient.  Also discussed with the patient regarding potential drug interactions with other medications.     Patient signed consent form.    UDS ordered

## 2024-01-17 NOTE — PROGRESS NOTES
PRIMARY CARE CLINIC VISIT        Chief Complaint   Patient presents with    Follow-Up     Refills.      Follow-up ADHD  Rx refill   asthma  Intermittent cough/wheezing  Prediabetes  Hyperlipidemia  Allergic rhinitis  Vitamin D deficiency  Vaccination status          History of Present Illness     Attention deficit hyperactivity disorder (ADHD)  Chronic condition.  The patient being treated with Concerta 54 Mg daily.  Patient Toller medication well.  Symptoms are well-controlled.  The patient denies any side effect.  Patient is requesting Rx refill    In prescribing controlled substances to this patient, I certify that I have obtained and reviewed the medical history of the patient. I have also made a good coty effort to obtain applicable records from other providers who have treated the patient.     I have reviewed patient's prescription history as maintained by the [place] Prescription Monitoring Program.      I have reviewed the medical records and have determined that a controlled substance treatment is medically indicated. I believe the benefits of controlled substance therapy outweigh the risks.      I have discussed the risks and benefits of treatment plan, and alternative therapies with the patient.  Also discussed with the patient regarding potential drug interactions with other medications.     Patient signed consent form.    UDS ordered     Dyslipidemia  Chronic condition.  The patient currently on diet therapy.  Lab test ordered for follow-up.    Prediabetes  Chronic condition.  Patient presently on diet therapy.  Patient is due for lab test    Allergic rhinitis  Chronic condition.  The patient is currently using Flonase nasal spray and Zyrtec 10 mg daily.  Patient denies shortness of breath or wheezing.    Cough  This is a new condition.  The patient reported intermittent wheezing  Patient was seen in urgent care recently.  Patient tested negative for COVID and influenza RSV and strep  infection.    Moderate persistent asthma with acute exacerbation  Chronic condition.  The patient is using Singulair 10 mg daily and albuterol as needed.  Patient reported intermittent wheezing as above.    Need for vaccination  Patient is due for influenza vaccine and Pneumovax.    Vitamin D deficiency  Chronic condition.  Patient presently not taking vitamin D.  Lab test ordered for follow-up    Current Outpatient Medications on File Prior to Visit   Medication Sig Dispense Refill    albuterol 108 (90 Base) MCG/ACT Aero Soln inhalation aerosol 2 PUFFS EVERY 4 HOURS ONLY IF NEEDED FOR COUGH, WHEEZING, OR SHORTNESS OF BREATH. 8.5 g 2    fluticasone (FLONASE) 50 MCG/ACT nasal spray Administer 1 Spray into affected nostril(S) every day. 16 g 0    montelukast (SINGULAIR) 10 MG Tab Take 1 Tablet by mouth every day. 90 Tablet 3    Cetirizine HCl (ZYRTEC ALLERGY PO) Take  by mouth 1 time a day as needed.       No current facility-administered medications on file prior to visit.        Allergies: Patient has no known allergies.    Current Outpatient Medications Ordered in Epic   Medication Sig Dispense Refill    methylphenidate (CONCERTA) 54 MG CR tablet Take 1 Tablet by mouth every morning for 90 days. 30 Tablet 0    fluticasone-salmeterol (ADVAIR) 250-50 MCG/ACT AEROSOL POWDER, BREATH ACTIVATED Inhale 1 Puff every 12 hours. Rinse mouth after use 1 Each 6    methylPREDNISolone (MEDROL DOSEPAK) 4 MG Tablet Therapy Pack As directed on the packaging label. 21 Tablet 0    albuterol 108 (90 Base) MCG/ACT Aero Soln inhalation aerosol 2 PUFFS EVERY 4 HOURS ONLY IF NEEDED FOR COUGH, WHEEZING, OR SHORTNESS OF BREATH. 8.5 g 2    fluticasone (FLONASE) 50 MCG/ACT nasal spray Administer 1 Spray into affected nostril(S) every day. 16 g 0    montelukast (SINGULAIR) 10 MG Tab Take 1 Tablet by mouth every day. 90 Tablet 3    Cetirizine HCl (ZYRTEC ALLERGY PO) Take  by mouth 1 time a day as needed.       No current Epic-ordered  "facility-administered medications on file.       Past Medical History:   Diagnosis Date    ADHD (attention deficit hyperactivity disorder)     Allergy     ASTHMA     Cough 2022    Depression     Shortness of breath        Past Surgical History:   Procedure Laterality Date    SHOULDER ARTHROSCOPY W/ SLAP / LABRAL REPAIR  3/13/2014    Performed by Fabián Rosenberg M.D. at [facility]    OTHER  2014    left shoulder dislocation put back in place    OTHER      detached retina OU       Family History   Family history unknown: Yes       Social History     Tobacco Use   Smoking Status Former    Current packs/day: 0.00    Types: Cigarettes    Quit date: 9/10/2017    Years since quittin.3    Passive exposure: Never   Smokeless Tobacco Never       Social History     Substance and Sexual Activity   Alcohol Use Yes    Comment: occ/social       Review of systems.  As per HPI above. All other systems reviewed and negative.      Past Medical, Social, and Family history reviewed and updated in EPIC     Objective     /82 (BP Location: Right arm, Patient Position: Sitting, BP Cuff Size: Adult)   Pulse 90   Temp 36.6 °C (97.8 °F) (Temporal)   Ht 1.626 m (5' 4\")   Wt 76 kg (167 lb 9.6 oz)   SpO2 95%    Body mass index is 28.77 kg/m².    General: alert in no apparent distress.  Cardiovascular: regular rate and rhythm  Pulmonary: lungs : Minimal expiratory wheezing wheezing   Gastrointestinal: BS present.   Nose with slight mucosal formation no purulent drainage oropharynx no exudate        Lab Results   Component Value Date/Time    HBA1C 5.8 (H) 10/02/2023 03:59 PM    HBA1C 5.6 2022 11:55 AM    HBA1C 5.7 (H) 2021 10:13 AM       Lab Results   Component Value Date/Time    WBC 5.2 10/02/2023 03:59 PM    HEMOGLOBIN 17.8 10/02/2023 03:59 PM    HEMATOCRIT 50.8 10/02/2023 03:59 PM    MCV 85.7 10/02/2023 03:59 PM    PLATELETCT 253 10/02/2023 03:59 PM         Lab Results   Component Value Date/Time "    SODIUM 139 10/02/2023 03:59 PM    POTASSIUM 4.1 10/02/2023 03:59 PM    GLUCOSE 110 (H) 10/02/2023 03:59 PM    BUN 12 10/02/2023 03:59 PM    CREATININE 0.91 10/02/2023 03:59 PM       Lab Results   Component Value Date/Time    CHOLSTRLTOT 205 (H) 10/02/2023 03:59 PM    TRIGLYCERIDE 144 10/02/2023 03:59 PM    HDL 44 10/02/2023 03:59 PM     (H) 10/02/2023 03:59 PM       Lab Results   Component Value Date/Time    ALTSGPT 24 10/02/2023 03:59 PM             Assessment and Plan     1. Attention deficit hyperactivity disorder (ADHD), unspecified ADHD type  - methylphenidate (CONCERTA) 54 MG CR tablet; Take 1 Tablet by mouth every morning for 90 days.  Dispense: 30 Tablet; Refill: 0  - Pain Management Screen; Future  Chronic stable condition.  Continue with Concerta 54 Mg daily.  Rx sent to pharmacy.  UDS ordered today.  Patient signed consent form today.    2. Moderate persistent asthma with acute exacerbation  - DX-CHEST-2 VIEWS; Future  Chronic condition.  The patient reported intermittent wheezing recently.  Advised the patient to start Medrol Dosepak.  After that the patient will start using maintenance Advair 1 puff twice daily.  Continue singular 10 mg daily.  Patient may use albuterol as needed.    3. Acute cough  New condition.  Patient tested negative for COVID, strep, influenza and RSV recently.  Chest x-ray ordered for follow-up.  Patient may take over-the-counter cough medicine as needed.  I suspect the patient may have possible acute reactive airway disease.    4. Prediabetes  - HEMOGLOBIN A1C; Future  - Basic Metabolic Panel; Future  Chronic condition.  Current status unclear.  Lab test ordered for follow-up    5. Dyslipidemia  - Lipid Profile; Future  Chronic condition.  Current status unclear.  Blood test requested to check lipid panel.  Recommend low-fat low-cholesterol diet.    6. Seasonal allergic rhinitis due to pollen  Chronic stable.  Continue Zyrtec 10 mg daily and singular 10 mg daily as  well as Flonase nasal spray.    7. Vitamin D deficiency  - VITAMIN D,25 HYDROXY (DEFICIENCY); Future  Chronic condition.  Current status unclear.  Lab test ordered for follow-up.    8. Need for vaccination  - INFLUENZA VACCINE QUAD INJ (PF)  - Pneumococcal Conjugate Vaccine 20-Valent    Other orders  - Consent for Opiate Prescription  - fluticasone-salmeterol (ADVAIR) 250-50 MCG/ACT AEROSOL POWDER, BREATH ACTIVATED; Inhale 1 Puff every 12 hours. Rinse mouth after use  Dispense: 1 Each; Refill: 6  - methylPREDNISolone (MEDROL DOSEPAK) 4 MG Tablet Therapy Pack; As directed on the packaging label.  Dispense: 21 Tablet; Refill: 0            Attestation: I spent:  41 min -  That includes time for chart review before the visit, the actual patient visit, and time spent on documentation in EMR after the visit.  Chart review/prep, review of other providers' records, imaging/lab review, face-to-face time for history/examination, pt's counseling/education, ordering, prescribing,  review of results/meds/ treatment plan with patient, and care coordination.           Healthcare Maintenance       Health Maintenance Due   Topic Date Due    HIV Screening  Never done    COVID-19 Vaccine (3 - 2023-24 season) 09/01/2023               Please note that this dictation was created using voice recognition software. I have made every reasonable attempt to correct obvious errors, but I expect that there are errors of grammar and possibly content that I did not discover before finalizing the note.    Cory Whiteside MD  Internal Medicine  [facility] care [facility]

## 2024-01-17 NOTE — ASSESSMENT & PLAN NOTE
This is a new condition.  The patient reported intermittent wheezing  Patient was seen in urgent care recently.  Patient tested negative for COVID and influenza RSV and strep infection.

## 2024-01-17 NOTE — ASSESSMENT & PLAN NOTE
Chronic condition.  The patient is currently using Flonase nasal spray and Zyrtec 10 mg daily.  Patient denies shortness of breath or wheezing.

## 2024-01-17 NOTE — ASSESSMENT & PLAN NOTE
Chronic condition.  The patient is using Singulair 10 mg daily and albuterol as needed.  Patient reported intermittent wheezing as above.

## 2024-02-21 ENCOUNTER — PATIENT MESSAGE (OUTPATIENT)
Dept: MEDICAL GROUP | Facility: PHYSICIAN GROUP | Age: 29
End: 2024-02-21
Payer: COMMERCIAL

## 2024-02-21 DIAGNOSIS — F90.9 ATTENTION DEFICIT HYPERACTIVITY DISORDER (ADHD), UNSPECIFIED ADHD TYPE: Chronic | ICD-10-CM

## 2024-02-21 DIAGNOSIS — J45.41 MODERATE PERSISTENT ASTHMA WITH EXACERBATION: ICD-10-CM

## 2024-02-22 RX ORDER — METHYLPHENIDATE HYDROCHLORIDE 54 MG/1
54 TABLET ORAL EVERY MORNING
Qty: 30 TABLET | Refills: 0 | Status: SHIPPED | OUTPATIENT
Start: 2024-02-22 | End: 2024-03-22 | Stop reason: SDUPTHER

## 2024-02-22 NOTE — TELEPHONE ENCOUNTER
Received request via: Pharmacy    Was the patient seen in the last year in this department? Yes    Does the patient have an active prescription (recently filled or refills available) for medication(s) requested? No    [facility]    Does the patient have [facility] and need 100 day supply (blood pressure, diabetes and cholesterol meds only)? Patient does not have SCP

## 2024-02-24 RX ORDER — ALBUTEROL SULFATE 90 UG/1
AEROSOL, METERED RESPIRATORY (INHALATION)
Qty: 8.5 G | Refills: 0 | Status: SHIPPED | OUTPATIENT
Start: 2024-02-24

## 2024-03-22 DIAGNOSIS — F90.9 ATTENTION DEFICIT HYPERACTIVITY DISORDER (ADHD), UNSPECIFIED ADHD TYPE: Chronic | ICD-10-CM

## 2024-03-25 RX ORDER — METHYLPHENIDATE HYDROCHLORIDE 54 MG/1
54 TABLET ORAL EVERY MORNING
Qty: 30 TABLET | Refills: 0 | Status: SHIPPED | OUTPATIENT
Start: 2024-03-25 | End: 2024-04-24

## 2024-03-25 NOTE — TELEPHONE ENCOUNTER
Received request via: Patient    Was the patient seen in the last year in this department? Yes    Does the patient have an active prescription (recently filled or refills available) for medication(s) requested? No    [facility]    Does the patient have skilled nursing [facility] and need 100 day supply (blood pressure, diabetes and cholesterol meds only)? Patient does not have SCP

## 2024-04-22 DIAGNOSIS — F90.9 ATTENTION DEFICIT HYPERACTIVITY DISORDER (ADHD), UNSPECIFIED ADHD TYPE: Chronic | ICD-10-CM

## 2024-04-23 RX ORDER — METHYLPHENIDATE HYDROCHLORIDE 54 MG/1
54 TABLET ORAL EVERY MORNING
Qty: 30 TABLET | Refills: 0 | Status: SHIPPED | OUTPATIENT
Start: 2024-04-23 | End: 2024-05-23

## 2024-04-23 NOTE — TELEPHONE ENCOUNTER
Received request via: Patient    Was the patient seen in the last year in this department? Yes    Does the patient have an active prescription (recently filled or refills available) for medication(s) requested? No    Pharmacy Name: [facility]     Does the patient have [facility] and need 100 day supply (blood pressure, diabetes and cholesterol meds only)? Patient does not have SCP

## 2024-05-07 ENCOUNTER — HOSPITAL ENCOUNTER (OUTPATIENT)
Dept: LAB | Facility: MEDICAL CENTER | Age: 29
End: 2024-05-07
Attending: INTERNAL MEDICINE
Payer: COMMERCIAL

## 2024-05-07 DIAGNOSIS — F90.9 ATTENTION DEFICIT HYPERACTIVITY DISORDER (ADHD), UNSPECIFIED ADHD TYPE: Chronic | ICD-10-CM

## 2024-05-07 DIAGNOSIS — E55.9 VITAMIN D DEFICIENCY: ICD-10-CM

## 2024-05-07 DIAGNOSIS — E78.5 DYSLIPIDEMIA: Chronic | ICD-10-CM

## 2024-05-07 DIAGNOSIS — R73.03 PREDIABETES: Chronic | ICD-10-CM

## 2024-05-07 LAB
25(OH)D3 SERPL-MCNC: 48 NG/ML (ref 30–100)
ANION GAP SERPL CALC-SCNC: 10 MMOL/L (ref 7–16)
BUN SERPL-MCNC: 13 MG/DL (ref 8–22)
CALCIUM SERPL-MCNC: 9.1 MG/DL (ref 8.5–10.5)
CHLORIDE SERPL-SCNC: 104 MMOL/L (ref 96–112)
CHOLEST SERPL-MCNC: 175 MG/DL (ref 100–199)
CO2 SERPL-SCNC: 24 MMOL/L (ref 20–33)
CREAT SERPL-MCNC: 0.71 MG/DL (ref 0.5–1.4)
EST. AVERAGE GLUCOSE BLD GHB EST-MCNC: 123 MG/DL
GFR SERPLBLD CREATININE-BSD FMLA CKD-EPI: 127 ML/MIN/1.73 M 2
GLUCOSE SERPL-MCNC: 107 MG/DL (ref 65–99)
HBA1C MFR BLD: 5.9 % (ref 4–5.6)
HDLC SERPL-MCNC: 44 MG/DL
LDLC SERPL CALC-MCNC: 115 MG/DL
POTASSIUM SERPL-SCNC: 4 MMOL/L (ref 3.6–5.5)
SODIUM SERPL-SCNC: 138 MMOL/L (ref 135–145)
TRIGL SERPL-MCNC: 82 MG/DL (ref 0–149)

## 2024-05-08 ENCOUNTER — APPOINTMENT (OUTPATIENT)
Dept: MEDICAL GROUP | Facility: PHYSICIAN GROUP | Age: 29
End: 2024-05-08
Payer: COMMERCIAL

## 2024-05-08 VITALS — BODY MASS INDEX: 26.46 KG/M2 | HEIGHT: 64 IN | WEIGHT: 155 LBS

## 2024-05-08 DIAGNOSIS — F90.9 ATTENTION DEFICIT HYPERACTIVITY DISORDER (ADHD), UNSPECIFIED ADHD TYPE: Chronic | ICD-10-CM

## 2024-05-08 DIAGNOSIS — J30.1 SEASONAL ALLERGIC RHINITIS DUE TO POLLEN: ICD-10-CM

## 2024-05-08 DIAGNOSIS — R73.03 PREDIABETES: Chronic | ICD-10-CM

## 2024-05-08 DIAGNOSIS — E78.5 DYSLIPIDEMIA: Chronic | ICD-10-CM

## 2024-05-08 DIAGNOSIS — J45.41 MODERATE PERSISTENT ASTHMA WITH ACUTE EXACERBATION: ICD-10-CM

## 2024-05-08 DIAGNOSIS — E55.9 VITAMIN D DEFICIENCY: Chronic | ICD-10-CM

## 2024-05-08 PROBLEM — J30.9 ALLERGIC RHINITIS: Chronic | Status: ACTIVE | Noted: 2021-05-27

## 2024-05-08 PROCEDURE — 99214 OFFICE O/P EST MOD 30 MIN: CPT | Mod: GT | Performed by: INTERNAL MEDICINE

## 2024-05-08 RX ORDER — FLUTICASONE PROPIONATE AND SALMETEROL 250; 50 UG/1; UG/1
1 POWDER RESPIRATORY (INHALATION) EVERY 12 HOURS
Qty: 1 EACH | Refills: 6 | Status: SHIPPED | OUTPATIENT
Start: 2024-05-08

## 2024-05-08 RX ORDER — METHYLPHENIDATE HYDROCHLORIDE 54 MG/1
54 TABLET ORAL EVERY MORNING
Qty: 30 TABLET | Refills: 0 | Status: SHIPPED | OUTPATIENT
Start: 2024-05-23 | End: 2024-06-22

## 2024-05-08 RX ORDER — CHOLECALCIFEROL (VITAMIN D3) 125 MCG
CAPSULE ORAL
COMMUNITY

## 2024-05-08 NOTE — ASSESSMENT & PLAN NOTE
Chronic condition.  Patient presently taking vitamin D supplementation.  Recent lab test result discussed with the patient.  Stable

## 2024-05-08 NOTE — ASSESSMENT & PLAN NOTE
Chronic condition.  The patient is using Advair 1 puff twice daily singular 10 mg daily.  Patient reported that for some reason his insurance no longer albuterol.  The patient will look into it and let us know.  Currently the patient asymptomatic.

## 2024-05-08 NOTE — ASSESSMENT & PLAN NOTE
Chronic condition.  The patient presently on diet therapy.   Patient has been exercising lost weight and follow a very good diet however no significant change in the A1c noted.  Unclear family history of diabetes and the patient was adopted   within normal limits

## 2024-05-08 NOTE — ASSESSMENT & PLAN NOTE
This is a chronic condition.  The patient has been taking Zyrtec 10 mg daily as needed.  Currently the patient asymptomatic.

## 2024-05-08 NOTE — ASSESSMENT & PLAN NOTE
Chronic condition.  The patient currently on diet therapy and exercise.  Recent lipid panel shows significant improvement in the lipid panel.

## 2024-05-08 NOTE — PROGRESS NOTES
This visit was conducted via  [employer] Video Virtual Visit using secure and encrypted videoconferencing technology.   The patient was at home in the state of [place]  The patient's identity was confirmed and verbal consent was obtained for this virtual visit.  -------------------------------------------------------------------------------    Chief Complaint   Patient presents with    Follow-Up     Check up.      Attention deficit disorder  Prediabetes  Hyperlipidemia  Vitamin D deficiency  Asthma  Allergic rhinitis  Lab test result  Rx refills      HPI: This is a 28 y.o. pt.  Pt's medical history is notable for:      Attention deficit hyperactivity disorder (ADHD)  This is a chronic condition.  Patient being treated with Concerta daily.  Symptoms are well-controlled.  Patient stated that he is able to function well with daily tasks.  No side effects reported.    Prediabetes  Chronic condition.  The patient presently on diet therapy.   Patient has been exercising lost weight and follow a very good diet however no significant change in the A1c noted.  Unclear family history of diabetes and the patient was adopted    Dyslipidemia  Chronic condition.  The patient currently on diet therapy and exercise.  Recent lipid panel shows significant improvement in the lipid panel.    Vitamin D deficiency  Chronic condition.  Patient presently taking vitamin D supplementation.  Recent lab test result discussed with the patient.  Stable    Allergic rhinitis  This is a chronic condition.  The patient has been taking Zyrtec 10 mg daily as needed.  Currently the patient asymptomatic.    Moderate persistent asthma with acute exacerbation  Chronic condition.  The patient is using Advair 1 puff twice daily singular 10 mg daily.  Patient reported that for some reason his insurance no longer albuterol.  The patient will look into it and let us know.  Currently the patient asymptomatic.         Allergies: Patient has no known  allergies.    Current Outpatient Medications Ordered in Epic   Medication Sig Dispense Refill    Cholecalciferol (VITAMIN D) 125 MCG (5000 UT) Cap Take  by mouth.      methylphenidate (CONCERTA) 54 MG CR tablet Take 1 Tablet by mouth every morning for 30 days. 30 Tablet 0    albuterol 108 (90 Base) MCG/ACT Aero Soln inhalation aerosol 2 PUFFS EVERY 4 HOURS ONLY IF NEEDED FOR COUGH, WHEEZING, OR SHORTNESS OF BREATH. 8.5 g 0    montelukast (SINGULAIR) 10 MG Tab Take 1 Tablet by mouth every day. 90 Tablet 3    Cetirizine HCl (ZYRTEC ALLERGY PO) Take  by mouth 1 time a day as needed.      fluticasone-salmeterol (ADVAIR) 250-50 MCG/ACT AEROSOL POWDER, BREATH ACTIVATED Inhale 1 Puff every 12 hours. Rinse mouth after use (Patient not taking: Reported on 2024) 1 Each 6    methylPREDNISolone (MEDROL DOSEPAK) 4 MG Tablet Therapy Pack As directed on the packaging label. (Patient not taking: Reported on 2024) 21 Tablet 0    fluticasone (FLONASE) 50 MCG/ACT nasal spray Administer 1 Spray into affected nostril(S) every day. (Patient not taking: Reported on 2024) 16 g 0     No current Epic-ordered facility-administered medications on file.       Past Medical History:   Diagnosis Date    ADHD (attention deficit hyperactivity disorder)     Allergy     ASTHMA     Cough 2022    Depression     Shortness of breath        Past Surgical History:   Procedure Laterality Date    SHOULDER ARTHROSCOPY W/ SLAP / LABRAL REPAIR  3/13/2014    Performed by Fabián Rosenberg M.D. at [facility]    OTHER  2014    left shoulder dislocation put back in place    OTHER      detached retina OU       Family History   Family history unknown: Yes       Social History     Tobacco Use   Smoking Status Former    Current packs/day: 0.00    Types: Cigarettes    Quit date: 9/10/2017    Years since quittin.6    Passive exposure: Never   Smokeless Tobacco Never       Social History     Substance and Sexual Activity   Alcohol Use  "Yes    Comment: occ/social         Review of systems:  As per HPI above. All other systems reviewed and negative.      Past Medical, Social, and Family history reviewed and updated in EPIC    ---------------------------------------------------------------     LAB DATA:    Lab Results   Component Value Date/Time    HBA1C 5.9 (H) 05/07/2024 01:30 PM    HBA1C 5.8 (H) 10/02/2023 03:59 PM    HBA1C 5.6 04/19/2022 11:55 AM       Lab Results   Component Value Date/Time    WBC 5.2 10/02/2023 03:59 PM    HEMOGLOBIN 17.8 10/02/2023 03:59 PM    HEMATOCRIT 50.8 10/02/2023 03:59 PM    MCV 85.7 10/02/2023 03:59 PM    PLATELETCT 253 10/02/2023 03:59 PM       Lab Results   Component Value Date/Time    SODIUM 138 05/07/2024 01:30 PM    POTASSIUM 4.0 05/07/2024 01:30 PM    GLUCOSE 107 (H) 05/07/2024 01:30 PM    BUN 13 05/07/2024 01:30 PM    CREATININE 0.71 05/07/2024 01:30 PM       Lab Results   Component Value Date/Time    CHOLSTRLTOT 175 05/07/2024 01:30 PM    TRIGLYCERIDE 82 05/07/2024 01:30 PM    HDL 44 05/07/2024 01:30 PM     (H) 05/07/2024 01:30 PM       Lab Results   Component Value Date/Time    ALTSGPT 24 10/02/2023 03:59 PM       ------------------------------------------------------------------    There were no vitals filed for this visit.  Vitals:    05/08/24 0713   Weight: 70.3 kg (155 lb)   Height: 1.626 m (5' 4\")        PHYSICAL EXAM:   Psych:  A&O x 3, mood and affect appropriate  Constitutional: no distress  Skin: No apparent rashes  ENMT: Lips without lesions. Phonation normal.  Respiratory: Unlabored respiratory effort      ---------------------------------------------------------------------    ASSESSMENT and PLAN:     1. Attention deficit hyperactivity disorder (ADHD), unspecified ADHD type  Chronic stable condition.  Continue Concerta 54 Mg daily.      2. Prediabetes  Chronic stable condition.  Lab test result discussed with the patient.  Continue with diet and exercise.  Continue to monitor    3. " Dyslipidemia  Chronic condition for improved status.  Lab test result discussed.  Continue with diet and exercise.    4. Seasonal allergic rhinitis due to pollen  Chronic stable.  Continue Zyrtec 10 mg daily    5. Vitamin D deficiency  Chronic stable.  Continue vitamin D supplementation 5000 unit daily    6. Moderate persistent asthma with acute exacerbation  Chronic stable.  Continue Advair 1 puff twice daily.  Rinse mouth after use.  Singular 10 mg daily.  Patient may use albuterol as needed.  As above the patient will contact his insurance regarding coverage for the albuterol medication                Attestation: I spent:   32    minutes   -That includes time for chart review before the visit, the actual patient visit, and time spent on documentation in EMR after the visit.  Chart review/prep, review of other providers' records, imaging/lab review, face-to-face time for history/examination, pt's counseling/education, ordering, prescribing,  review of results/meds/ treatment plan with patient, and care coordination.           Please note that this dictation was created using voice recognition software. I have made every reasonable attempt to correct obvious errors, but I expect that there are errors of grammar and possibly content that I did not discover before finalizing the note.       Cory Whiteside MD  Internal Medicine  [facility]

## 2024-05-08 NOTE — ASSESSMENT & PLAN NOTE
This is a chronic condition.  Patient being treated with Concerta daily.  Symptoms are well-controlled.  Patient stated that he is able to function well with daily tasks.  No side effects reported.

## 2024-05-11 LAB
1OH-MIDAZOLAM UR QL SCN: NOT DETECTED
6MAM UR QL: NOT DETECTED
7AMINOCLONAZEPAM UR QL: NOT DETECTED
A-OH ALPRAZ UR QL: NOT DETECTED
ALPRAZ UR QL: NOT DETECTED
AMPHET UR QL SCN: NOT DETECTED
ANNOTATION COMMENT IMP: NORMAL
BARBITURATES UR QL: NEGATIVE
BUPRENORPHINE UR QL: NOT DETECTED
BZE UR QL: NEGATIVE
CARBOXYTHC UR QL: NEGATIVE
CARISOPRODOL UR QL: NEGATIVE
CLONAZEPAM UR QL: NOT DETECTED
CODEINE UR QL: NOT DETECTED
CREAT UR-MCNC: 169.8 MG/DL (ref 20–400)
DIAZEPAM UR QL: NOT DETECTED
ETHYL GLUCURONIDE UR QL: NORMAL
FENTANYL UR QL: NOT DETECTED
GABAPENTIN UR QL CFM: NOT DETECTED
HYDROCODONE UR QL: NOT DETECTED
HYDROMORPHONE UR QL: NOT DETECTED
LORAZEPAM UR QL: NOT DETECTED
MDA UR QL: NOT DETECTED
MDEA UR QL: NOT DETECTED
MDMA UR QL: NOT DETECTED
ME-PHENIDATE UR QL: PRESENT
METHADONE UR QL: NEGATIVE
METHAMPHET UR QL: NOT DETECTED
MIDAZOLAM UR QL SCN: NOT DETECTED
MORPHINE UR QL: NOT DETECTED
NALOXONE UR QL CFM: NOT DETECTED
NORBUPRENORPHINE UR QL CFM: NOT DETECTED
NORDIAZEPAM UR QL: NOT DETECTED
NORFENTANYL UR QL: NOT DETECTED
NORHYDROCODONE UR QL CFM: NOT DETECTED
NORMEPERIDINE UR QL CFM: NOT DETECTED
NOROXYCODONE UR QL CFM: NOT DETECTED
NOROXYMORPHONE UR QL SCN: NOT DETECTED
OXAZEPAM UR QL: NOT DETECTED
OXYCODONE UR QL: NOT DETECTED
OXYMORPHONE UR QL: NOT DETECTED
PATHOLOGY STUDY: NORMAL
PCP UR QL: NEGATIVE
PHENTERMINE UR QL: NOT DETECTED
PREGABALIN UR QL CFM: NOT DETECTED
SERVICE CMNT-IMP: NORMAL
TAPENTADOL UR QL SCN: NOT DETECTED
TAPENTADOL UR QL SCN: NOT DETECTED
TEMAZEPAM UR QL: NOT DETECTED
TRAMADOL UR QL: NEGATIVE
ZOLPIDEM PHENYL-4-CARB UR QL SCN: NOT DETECTED
ZOLPIDEM UR QL: NOT DETECTED

## 2024-06-04 DIAGNOSIS — F90.9 ATTENTION DEFICIT HYPERACTIVITY DISORDER (ADHD), UNSPECIFIED ADHD TYPE: Chronic | ICD-10-CM

## 2024-06-04 RX ORDER — METHYLPHENIDATE HYDROCHLORIDE 54 MG/1
54 TABLET ORAL EVERY MORNING
Qty: 30 TABLET | Refills: 0 | Status: SHIPPED | OUTPATIENT
Start: 2024-06-04 | End: 2024-07-04

## 2024-06-04 NOTE — TELEPHONE ENCOUNTER
Received request via: Patient    Was the patient seen in the last year in this department? Yes    Does the patient have an active prescription (recently filled or refills available) for medication(s) requested? Yes. Pharmacy change    Pharmacy Name: [facility]    Does the patient have nursing home Plus and need 100 day supply (blood pressure, diabetes and cholesterol meds only)? Patient does not have SCP

## 2024-07-11 DIAGNOSIS — F90.9 ATTENTION DEFICIT HYPERACTIVITY DISORDER (ADHD), UNSPECIFIED ADHD TYPE: Chronic | ICD-10-CM

## 2024-07-12 RX ORDER — METHYLPHENIDATE HYDROCHLORIDE 54 MG/1
54 TABLET ORAL EVERY MORNING
Qty: 30 TABLET | Refills: 0 | Status: SHIPPED | OUTPATIENT
Start: 2024-07-12 | End: 2024-08-11

## 2024-08-19 ENCOUNTER — OFFICE VISIT (OUTPATIENT)
Dept: MEDICAL GROUP | Facility: PHYSICIAN GROUP | Age: 29
End: 2024-08-19
Payer: COMMERCIAL

## 2024-08-19 VITALS
DIASTOLIC BLOOD PRESSURE: 70 MMHG | SYSTOLIC BLOOD PRESSURE: 112 MMHG | WEIGHT: 156.5 LBS | HEIGHT: 64 IN | BODY MASS INDEX: 26.72 KG/M2 | TEMPERATURE: 99 F | RESPIRATION RATE: 20 BRPM | OXYGEN SATURATION: 97 % | HEART RATE: 84 BPM

## 2024-08-19 DIAGNOSIS — J45.41 MODERATE PERSISTENT ASTHMA WITH ACUTE EXACERBATION: Chronic | ICD-10-CM

## 2024-08-19 DIAGNOSIS — E55.9 VITAMIN D DEFICIENCY: Chronic | ICD-10-CM

## 2024-08-19 DIAGNOSIS — L30.9 ECZEMA, UNSPECIFIED TYPE: ICD-10-CM

## 2024-08-19 DIAGNOSIS — J30.1 SEASONAL ALLERGIC RHINITIS DUE TO POLLEN: Chronic | ICD-10-CM

## 2024-08-19 DIAGNOSIS — E78.5 DYSLIPIDEMIA: Chronic | ICD-10-CM

## 2024-08-19 DIAGNOSIS — F90.9 ATTENTION DEFICIT HYPERACTIVITY DISORDER (ADHD), UNSPECIFIED ADHD TYPE: Chronic | ICD-10-CM

## 2024-08-19 DIAGNOSIS — R73.03 PREDIABETES: Chronic | ICD-10-CM

## 2024-08-19 PROBLEM — R05.9 COUGH: Status: RESOLVED | Noted: 2022-04-26 | Resolved: 2024-08-19

## 2024-08-19 PROCEDURE — 3074F SYST BP LT 130 MM HG: CPT | Performed by: INTERNAL MEDICINE

## 2024-08-19 PROCEDURE — 99214 OFFICE O/P EST MOD 30 MIN: CPT | Performed by: INTERNAL MEDICINE

## 2024-08-19 PROCEDURE — 3078F DIAST BP <80 MM HG: CPT | Performed by: INTERNAL MEDICINE

## 2024-08-19 RX ORDER — FLUTICASONE PROPIONATE AND SALMETEROL 250; 50 UG/1; UG/1
1 POWDER RESPIRATORY (INHALATION) EVERY 12 HOURS
Qty: 1 EACH | Refills: 6 | Status: SHIPPED | OUTPATIENT
Start: 2024-08-19

## 2024-08-19 RX ORDER — METHYLPHENIDATE HYDROCHLORIDE 54 MG/1
54 TABLET ORAL EVERY MORNING
Qty: 30 TABLET | Refills: 0 | Status: SHIPPED | OUTPATIENT
Start: 2024-09-19 | End: 2024-10-19

## 2024-08-19 RX ORDER — METHYLPHENIDATE HYDROCHLORIDE 54 MG/1
54 TABLET ORAL EVERY MORNING
Qty: 30 TABLET | Refills: 0 | Status: SHIPPED | OUTPATIENT
Start: 2024-08-19 | End: 2024-09-18

## 2024-08-19 RX ORDER — MONTELUKAST SODIUM 10 MG/1
10 TABLET ORAL DAILY
Qty: 90 TABLET | Refills: 3 | Status: SHIPPED | OUTPATIENT
Start: 2024-08-19

## 2024-08-19 RX ORDER — FLUTICASONE PROPIONATE 50 MCG
1 SPRAY, SUSPENSION (ML) NASAL DAILY
Qty: 16 G | Refills: 6 | Status: SHIPPED | OUTPATIENT
Start: 2024-08-19

## 2024-08-19 RX ORDER — METHYLPHENIDATE HYDROCHLORIDE 54 MG/1
54 TABLET ORAL EVERY MORNING
Qty: 30 TABLET | Refills: 0 | Status: SHIPPED | OUTPATIENT
Start: 2024-10-19 | End: 2024-11-18

## 2024-08-19 NOTE — PROGRESS NOTES
PRIMARY CARE CLINIC VISIT        Chief Complaint   Patient presents with    Follow-Up     Med follow up       Attention deficit disorder  Allergy  Referral request  Prediabetes  Hyperlipidemia  Asthma  Vitamin D deficiency  Eczema  Request lab test      History of Present Illness     Allergic rhinitis  Chronic condition with the patient has been followed by allergy specialist.  The patient request update referral.  The patient currently using Flonase nasal spray.  The patient is requesting Rx refill.    Attention deficit hyperactivity disorder (ADHD)  Chronic condition.  The patient has been taking Concerta daily.  Patient tolerating medication well.  Symptoms are well-controlled.  Patient request Rx refill.    Prediabetes  Chronic condition.  The patient currently on diet therapy.  Patient is due for lab test.    Dyslipidemia  Chronic condition.  The patient currently on diet therapy.  Patient is due for lab test.    Moderate persistent asthma with acute exacerbation  Chronic condition.  The patient has prescription for Singulair 10 mg daily, Advair and albuterol.  The patient currently doing well.  Denies shortness of breath or wheezing.    Vitamin D deficiency  Chronic condition.  The patient is taking vitamin D supplementation.  Tolerating medication well.  Recent lab test result discussed with the patient.    Eczematous dermatitis  Chronic condition.  The patient uses topical steroid as needed.  Currently the patient asymptomatic    Current Outpatient Medications on File Prior to Visit   Medication Sig Dispense Refill    Cholecalciferol (VITAMIN D) 125 MCG (5000 UT) Cap Take  by mouth.      albuterol 108 (90 Base) MCG/ACT Aero Soln inhalation aerosol 2 PUFFS EVERY 4 HOURS ONLY IF NEEDED FOR COUGH, WHEEZING, OR SHORTNESS OF BREATH. 8.5 g 0    Cetirizine HCl (ZYRTEC ALLERGY PO) Take  by mouth 1 time a day as needed.       No current facility-administered medications on file prior to visit.        Allergies:  Patient has no known allergies.    Current Outpatient Medications Ordered in Epic   Medication Sig Dispense Refill    methylphenidate (CONCERTA) 54 MG CR tablet Take 1 Tablet by mouth every morning for 30 days. 30 Tablet 0    fluticasone (FLONASE) 50 MCG/ACT nasal spray Administer 1 Spray into affected nostril(S) every day. 16 g 6    montelukast (SINGULAIR) 10 MG Tab Take 1 Tablet by mouth every day. 90 Tablet 3    fluticasone-salmeterol (ADVAIR) 250-50 MCG/ACT AEROSOL POWDER, BREATH ACTIVATED Inhale 1 Puff every 12 hours. Rinse mouth after use 1 Each 6    [START ON 2024] methylphenidate (CONCERTA) 54 MG CR tablet Take 1 Tablet by mouth every morning for 30 days. 30 Tablet 0    [START ON 10/19/2024] methylphenidate (CONCERTA) 54 MG CR tablet Take 1 Tablet by mouth every morning for 30 days. 30 Tablet 0    Cholecalciferol (VITAMIN D) 125 MCG (5000 UT) Cap Take  by mouth.      albuterol 108 (90 Base) MCG/ACT Aero Soln inhalation aerosol 2 PUFFS EVERY 4 HOURS ONLY IF NEEDED FOR COUGH, WHEEZING, OR SHORTNESS OF BREATH. 8.5 g 0    Cetirizine HCl (ZYRTEC ALLERGY PO) Take  by mouth 1 time a day as needed.       No current Epic-ordered facility-administered medications on file.       Past Medical History:   Diagnosis Date    ADHD (attention deficit hyperactivity disorder)     Allergy     ASTHMA     Cough 2022    Depression     Shortness of breath        Past Surgical History:   Procedure Laterality Date    SHOULDER ARTHROSCOPY W/ SLAP / LABRAL REPAIR  3/13/2014    Performed by Fabián Rosenberg M.D. at [facility]    OTHER  2014    left shoulder dislocation put back in place    OTHER      detached retina OU       Family History   Family history unknown: Yes       Social History     Tobacco Use   Smoking Status Former    Current packs/day: 0.00    Types: Cigarettes    Quit date: 9/10/2017    Years since quittin.9    Passive exposure: Never   Smokeless Tobacco Never       Social History  "    Substance and Sexual Activity   Alcohol Use Yes    Comment: occ/social       Review of systems  As per HPI above. All other systems reviewed and negative.      Past Medical, Social, and Family history reviewed and updated in [facility]       LAB DATA:    Lab Results   Component Value Date/Time    HBA1C 5.9 (H) 05/07/2024 01:30 PM    HBA1C 5.8 (H) 10/02/2023 03:59 PM    HBA1C 5.6 04/19/2022 11:55 AM       Lab Results   Component Value Date/Time    WBC 5.2 10/02/2023 03:59 PM    HEMOGLOBIN 17.8 10/02/2023 03:59 PM    HEMATOCRIT 50.8 10/02/2023 03:59 PM    MCV 85.7 10/02/2023 03:59 PM    PLATELETCT 253 10/02/2023 03:59 PM       Lab Results   Component Value Date/Time    SODIUM 138 05/07/2024 01:30 PM    POTASSIUM 4.0 05/07/2024 01:30 PM    GLUCOSE 107 (H) 05/07/2024 01:30 PM    BUN 13 05/07/2024 01:30 PM    CREATININE 0.71 05/07/2024 01:30 PM       Lab Results   Component Value Date/Time    CHOLSTRLTOT 175 05/07/2024 01:30 PM    TRIGLYCERIDE 82 05/07/2024 01:30 PM    HDL 44 05/07/2024 01:30 PM     (H) 05/07/2024 01:30 PM       Lab Results   Component Value Date/Time    ALTSGPT 24 10/02/2023 03:59 PM          Objective     /70 (BP Location: Left arm, Patient Position: Sitting, BP Cuff Size: Adult)   Pulse 84   Temp 37.2 °C (99 °F) (Temporal)   Resp 20   Ht 1.626 m (5' 4\")   Wt 71 kg (156 lb 8 oz)   SpO2 97%    Body mass index is 26.86 kg/m².    General: alert in no apparent distress.  Cardiovascular: regular rate and rhythm  Pulmonary: lungs : no wheezing   Gastrointestinal: BS present.   Sinus with no purulent drainage  Oropharynx no exudate    Assessment and Plan     1. Attention deficit hyperactivity disorder (ADHD), unspecified ADHD type  Chronic stable condition.  Symptoms are well-controlled with Concerta.  Refill prescribed.  Patient tolerating medication well.  UDS completed in May 2024.  The patient signed consent form January 2024    In prescribing controlled substances to this patient, I " certify that I have obtained and reviewed the medical history of the patient. I have also made a good coty effort to obtain applicable records from other providers who have treated the patient.     I have reviewed patient's prescription history as maintained by the [place] Prescription Monitoring Program.      I have reviewed the medical records and have determined that a controlled substance treatment is medically indicated. I believe the benefits of controlled substance therapy outweigh the risks.      I have discussed the risks and benefits of treatment plan, and alternative therapies with the patient.  Also discussed with the patient regarding potential drug interactions with other medications.     - methylphenidate (CONCERTA) 54 MG CR tablet; Take 1 Tablet by mouth every morning for 30 days.  Dispense: 30 Tablet; Refill: 0  - methylphenidate (CONCERTA) 54 MG CR tablet; Take 1 Tablet by mouth every morning for 30 days.  Dispense: 30 Tablet; Refill: 0  - methylphenidate (CONCERTA) 54 MG CR tablet; Take 1 Tablet by mouth every morning for 30 days.  Dispense: 30 Tablet; Refill: 0    2. Seasonal allergic rhinitis due to pollen  Chronic stable condition.  Recommend patient to take Zyrtec 10 mg daily over-the-counter.  Continue Flonase nasal spray.  - fluticasone (FLONASE) 50 MCG/ACT nasal spray; Administer 1 Spray into affected nostril(S) every day.  Dispense: 16 g; Refill: 6  - Referral to Allergy    3. Prediabetes  - HEMOGLOBIN A1C; Future  - Basic Metabolic Panel; Future  Chronic condition.  Current status unclear.  Lab test requested to check A1c.  Continue with low sweet low-carb diet    4. Dyslipidemia  - ALANINE AMINO-TRANS; Future  - Lipid Profile; Future  Chronic stable condition.  Continue with diet exercise.  Lab test ordered for follow-up    5. Moderate persistent asthma with acute exacerbation  Chronic stable.  Continue Singulair 10 mg daily.  Advair 1 puff twice daily.  Rinse mouth after use.  The  patient may use albuterol as needed for rescue treatment    - montelukast (SINGULAIR) 10 MG Tab; Take 1 Tablet by mouth every day.  Dispense: 90 Tablet; Refill: 3  - fluticasone-salmeterol (ADVAIR) 250-50 MCG/ACT AEROSOL POWDER, BREATH ACTIVATED; Inhale 1 Puff every 12 hours. Rinse mouth after use  Dispense: 1 Each; Refill: 6  - Referral to Allergy    6. Vitamin D deficiency  Chronic stable.  Continue vitamin D 3000 unit daily    7. Eczema, unspecified type  Chronic stable.  The patient may use hydrocortisone cream 1% over-the-counter twice daily PN.                      Please note that this dictation was created using voice recognition software. I have made every reasonable attempt to correct obvious errors, but I expect that there are errors of grammar and possibly content that I did not discover before finalizing the note.    Cory Whiteside MD  Internal Medicine  [facility]

## 2024-08-19 NOTE — ASSESSMENT & PLAN NOTE
Chronic condition with the patient has been followed by allergy specialist.  The patient request update referral.  The patient currently using Flonase nasal spray.  The patient is requesting Rx refill.

## 2024-08-19 NOTE — ASSESSMENT & PLAN NOTE
Chronic condition.  The patient has prescription for Singulair 10 mg daily, Advair and albuterol.  The patient currently doing well.  Denies shortness of breath or wheezing.

## 2024-08-19 NOTE — ASSESSMENT & PLAN NOTE
Chronic condition.  The patient is taking vitamin D supplementation.  Tolerating medication well.  Recent lab test result discussed with the patient.

## 2024-08-19 NOTE — ASSESSMENT & PLAN NOTE
Chronic condition.  The patient uses topical steroid as needed.  Currently the patient asymptomatic

## 2024-08-19 NOTE — ASSESSMENT & PLAN NOTE
Chronic condition.  The patient has been taking Concerta daily.  Patient tolerating medication well.  Symptoms are well-controlled.  Patient request Rx refill.

## 2024-11-27 ENCOUNTER — OFFICE VISIT (OUTPATIENT)
Dept: MEDICAL GROUP | Facility: PHYSICIAN GROUP | Age: 29
End: 2024-11-27
Payer: COMMERCIAL

## 2024-11-27 VITALS
HEART RATE: 94 BPM | DIASTOLIC BLOOD PRESSURE: 72 MMHG | SYSTOLIC BLOOD PRESSURE: 110 MMHG | OXYGEN SATURATION: 98 % | BODY MASS INDEX: 28.12 KG/M2 | TEMPERATURE: 98 F | HEIGHT: 64 IN | WEIGHT: 164.7 LBS

## 2024-11-27 DIAGNOSIS — J30.1 SEASONAL ALLERGIC RHINITIS DUE TO POLLEN: Chronic | ICD-10-CM

## 2024-11-27 DIAGNOSIS — R21 RASH: ICD-10-CM

## 2024-11-27 DIAGNOSIS — Z00.00 WELL ADULT EXAM: ICD-10-CM

## 2024-11-27 DIAGNOSIS — M25.50 ARTHRALGIA, UNSPECIFIED JOINT: ICD-10-CM

## 2024-11-27 DIAGNOSIS — J45.41 MODERATE PERSISTENT ASTHMA WITH ACUTE EXACERBATION: Chronic | ICD-10-CM

## 2024-11-27 DIAGNOSIS — R73.03 PREDIABETES: Chronic | ICD-10-CM

## 2024-11-27 DIAGNOSIS — F90.9 ATTENTION DEFICIT HYPERACTIVITY DISORDER (ADHD), UNSPECIFIED ADHD TYPE: Chronic | ICD-10-CM

## 2024-11-27 DIAGNOSIS — B00.1 COLD SORE: ICD-10-CM

## 2024-11-27 DIAGNOSIS — E78.5 DYSLIPIDEMIA: Chronic | ICD-10-CM

## 2024-11-27 PROCEDURE — 99214 OFFICE O/P EST MOD 30 MIN: CPT | Performed by: INTERNAL MEDICINE

## 2024-11-27 PROCEDURE — 3078F DIAST BP <80 MM HG: CPT | Performed by: INTERNAL MEDICINE

## 2024-11-27 PROCEDURE — 3074F SYST BP LT 130 MM HG: CPT | Performed by: INTERNAL MEDICINE

## 2024-11-27 RX ORDER — VALACYCLOVIR HYDROCHLORIDE 1 G/1
1000 TABLET, FILM COATED ORAL DAILY
Qty: 5 TABLET | Refills: 4 | Status: SHIPPED | OUTPATIENT
Start: 2024-11-27

## 2024-11-27 RX ORDER — METHYLPHENIDATE HYDROCHLORIDE 54 MG/1
54 TABLET ORAL EVERY MORNING
Qty: 30 TABLET | Refills: 0 | Status: SHIPPED | OUTPATIENT
Start: 2024-11-27 | End: 2024-12-27

## 2024-11-27 RX ORDER — KETOCONAZOLE 20 MG/G
CREAM TOPICAL
Qty: 60 G | Refills: 2 | Status: SHIPPED | OUTPATIENT
Start: 2024-11-27

## 2024-11-27 NOTE — ASSESSMENT & PLAN NOTE
This is a chronic recurrent condition.  The patient reported joint pain specially in the left hand both hips and knees.  Unclear family history [unclear family history/patient was adopted]

## 2024-11-27 NOTE — PROGRESS NOTES
PRIMARY CARE CLINIC VISIT        Chief Complaint   Patient presents with    Follow-Up     Refills, sore in mouth for a few years. Rash on thighs      Attention deficit disorder  Next refill  Allergy  Prediabetes  Hyperlipidemia  Asthma  Rash  Cold sore  Arthralgia        History of Present Illness     Attention deficit hyperactivity disorder (ADHD)  Chronic condition.  Patient presently taking Concerta.  Patient tolerating medication well.  Symptoms are well-controlled.  Patient request Rx refill.  No significant side effects reported.    Allergic rhinitis  Chronic condition.  Patient was seen by allergy specialist previously.  Currently patient is taking Singulair and Advair as well as Zyrtec as needed.  He also uses Flonase nasal spray.  Denies fever chills shortness of breath or wheezing.    Prediabetes  Chronic condition.  The patient currently on diet therapy.  Lab test requested for follow-up.    Dyslipidemia  This is a chronic condition.  The patient currently on diet therapy.  Previous lab test show slightly elevated LDL level.  Asymptomatic.    Moderate persistent asthma with acute exacerbation  Chronic condition.  The patient presently taking Singulair and Advair.  Patient uses albuterol as needed.  Currently the patient asymptomatic.    Rash  This is a new condition.  The patient reported rash noted in both groin region since the last couple of weeks.  Patient denies pain discomfort or itching.  Moisture and sweat appear to make it worse.    Cold sore  Chronic recurrent condition noted in the mouth lower lip.  Patient denies fever or chills.  Currently the patient asymptomatic.    Arthralgia  This is a chronic recurrent condition.  The patient reported joint pain specially in the left hand both hips and knees.  Unclear family history [unclear family history/patient was adopted]      Current Outpatient Medications on File Prior to Visit   Medication Sig Dispense Refill    fluticasone (FLONASE) 50 MCG/ACT  nasal spray Administer 1 Spray into affected nostril(S) every day. 16 g 6    montelukast (SINGULAIR) 10 MG Tab Take 1 Tablet by mouth every day. 90 Tablet 3    fluticasone-salmeterol (ADVAIR) 250-50 MCG/ACT AEROSOL POWDER, BREATH ACTIVATED Inhale 1 Puff every 12 hours. Rinse mouth after use 1 Each 6    Cholecalciferol (VITAMIN D) 125 MCG (5000 UT) Cap Take  by mouth.      albuterol 108 (90 Base) MCG/ACT Aero Soln inhalation aerosol 2 PUFFS EVERY 4 HOURS ONLY IF NEEDED FOR COUGH, WHEEZING, OR SHORTNESS OF BREATH. 8.5 g 0    Cetirizine HCl (ZYRTEC ALLERGY PO) Take  by mouth 1 time a day as needed.       No current facility-administered medications on file prior to visit.        Allergies: Patient has no known allergies.    Current Outpatient Medications Ordered in Epic   Medication Sig Dispense Refill    ketoconazole (NIZORAL) 2 % Cream Apply to affected areas bid as needed 60 g 2    methylphenidate (CONCERTA) 54 MG CR tablet Take 1 Tablet by mouth every morning for 30 days. 30 Tablet 0    valacyclovir (VALTREX) 1 GM Tab Take 1 Tablet by mouth every day. 5 Tablet 4    fluticasone (FLONASE) 50 MCG/ACT nasal spray Administer 1 Spray into affected nostril(S) every day. 16 g 6    montelukast (SINGULAIR) 10 MG Tab Take 1 Tablet by mouth every day. 90 Tablet 3    fluticasone-salmeterol (ADVAIR) 250-50 MCG/ACT AEROSOL POWDER, BREATH ACTIVATED Inhale 1 Puff every 12 hours. Rinse mouth after use 1 Each 6    Cholecalciferol (VITAMIN D) 125 MCG (5000 UT) Cap Take  by mouth.      albuterol 108 (90 Base) MCG/ACT Aero Soln inhalation aerosol 2 PUFFS EVERY 4 HOURS ONLY IF NEEDED FOR COUGH, WHEEZING, OR SHORTNESS OF BREATH. 8.5 g 0    Cetirizine HCl (ZYRTEC ALLERGY PO) Take  by mouth 1 time a day as needed.       No current Epic-ordered facility-administered medications on file.       Past Medical History:   Diagnosis Date    ADHD (attention deficit hyperactivity disorder)     Allergy     ASTHMA     Cough 04/26/2022    Depression      "Shortness of breath        Past Surgical History:   Procedure Laterality Date    SHOULDER ARTHROSCOPY W/ SLAP / LABRAL REPAIR  3/13/2014    Performed by Fabián Rosenberg M.D. at SURGERY [facility]    OTHER  2014    left shoulder dislocation put back in place    OTHER      detached retina OU       Family History   Family history unknown: Yes       Social History     Tobacco Use   Smoking Status Former    Current packs/day: 0.00    Types: Cigarettes    Quit date: 9/10/2017    Years since quittin.2    Passive exposure: Never   Smokeless Tobacco Never       Social History     Substance and Sexual Activity   Alcohol Use Yes    Comment: occ/social       Review of systems  As per HPI above. All other systems reviewed and negative.      Past Medical, Social, and Family history reviewed and updated in EPIC       LAB DATA:     I have independently reviewed / interpreted labs    Lab Results   Component Value Date/Time    HBA1C 5.9 (H) 2024 01:30 PM    HBA1C 5.8 (H) 10/02/2023 03:59 PM    HBA1C 5.6 2022 11:55 AM        Lab Results   Component Value Date/Time    WBC 5.2 10/02/2023 03:59 PM    HEMOGLOBIN 17.8 10/02/2023 03:59 PM    HEMATOCRIT 50.8 10/02/2023 03:59 PM    MCV 85.7 10/02/2023 03:59 PM    PLATELETCT 253 10/02/2023 03:59 PM       Lab Results   Component Value Date/Time    SODIUM 138 2024 01:30 PM    POTASSIUM 4.0 2024 01:30 PM    GLUCOSE 107 (H) 2024 01:30 PM    BUN 13 2024 01:30 PM    CREATININE 0.71 2024 01:30 PM       Lab Results   Component Value Date/Time    CHOLSTRLTOT 175 2024 01:30 PM    TRIGLYCERIDE 82 2024 01:30 PM    HDL 44 2024 01:30 PM     (H) 2024 01:30 PM       Lab Results   Component Value Date/Time    ALTSGPT 24 10/02/2023 03:59 PM          Objective     /72 (BP Location: Right arm, Patient Position: Sitting, BP Cuff Size: Adult)   Pulse 94   Temp 36.7 °C (98 °F) (Temporal)   Ht 1.626 m (5' 4\")   Wt 74.7 kg " (164 lb 11.2 oz)   SpO2 98%    Body mass index is 28.27 kg/m².    General: alert in no apparent distress.  Cardiovascular: regular rate and rhythm  Pulmonary: lungs : no wheezing   Gastrointestinal: BS present.   Mild superficial ulcer noted in the lower lip.  No bleeding or discharge noted.  Hand joint unremarkable.  Knee : No signficant swelling redness or deformity.   ROM limited due to pain specifically w knee flexion/extension.    Skin moist erythematous rash noted in the groin  Assessment and Plan     1. Attention deficit hyperactivity disorder (ADHD), unspecified ADHD type  - methylphenidate (CONCERTA) 54 MG CR tablet; Take 1 Tablet by mouth every morning for 30 days.  Dispense: 30 Tablet; Refill: 0    This is a chronic stable condition.  Refill Concerta 54 Mg daily.  Patient denies significant side effects.  Patient signed consent form today.  UDS completed May 2024.  In prescribing controlled substances to this patient, I certify that I have obtained and reviewed the medical history of the patient. I have also made a good coty effort to obtain applicable records from other providers who have treated the patient.     I have reviewed patient's prescription history as maintained by the [place] Prescription Monitoring Program.      I have reviewed the medical records and have determined that a controlled substance treatment is medically indicated. I believe the benefits of controlled substance therapy outweigh the risks.      I have discussed the risks and benefits of treatment plan, and alternative therapies with the patient.  Also discussed with the patient regarding potential drug interactions with other medications.     Patient signed consent form        2. Seasonal allergic rhinitis due to pollen  Chronic stable condition.  The patient may takes Zyrtec 10 mg daily as needed.  Flonase nasal spray daily..    3. Prediabetes  - HEMOGLOBIN A1C; Future  - Basic Metabolic Panel; Future  Chronic condition.   Current status unclear.  Lab test ordered to check A1c.    4. Dyslipidemia  - ALANINE AMINO-TRANS; Future  - Lipid Profile; Future  Chronic condition.  Status unclear.  Lipid panel requested.  Continue with low-fat low-cholesterol diet      5. Moderate persistent asthma with acute exacerbation  Chronic stable condition.  Continue to use Advair 1 puff twice daily.  Patient reported that his insurance may not cover albuterol.  He will find out which medication alternative would be covered and the patient will let us know.    6. Rash  new condition.  Exam consistent with intertrigo.  Fungal cream ketoconazole prescribed.  Follow-up if not better  Other orders  - ketoconazole (NIZORAL) 2 % Cream; Apply to affected areas bid as needed  Dispense: 60 g; Refill: 2    7. Cold sore  Chronic recurrent condition.  - valacyclovir (VALTREX) 1 GM Tab; Take 1 Tablet by mouth every day.  Dispense: 5 Tablet; Refill: 4    8. Arthralgia, unspecified joint  - RHEUMATOID ARTHRITIS FACTOR; Future  - VARGAS REFLEXIVE PROFILE; Future  - Sed Rate; Future  - URIC ACID; Future  Chronic recurrent condition.  The following lab test requested.                    Please note that this dictation was created using voice recognition software. I have made every reasonable attempt to correct obvious errors, but I expect that there are errors of grammar and possibly content that I did not discover before finalizing the note.    Cory Whiteside MD  Internal Medicine  [facility]

## 2024-11-27 NOTE — ASSESSMENT & PLAN NOTE
Chronic condition.  Patient was seen by allergy specialist previously.  Currently patient is taking Singulair and Advair as well as Zyrtec as needed.  He also uses Flonase nasal spray.  Denies fever chills shortness of breath or wheezing.

## 2024-11-27 NOTE — ASSESSMENT & PLAN NOTE
Chronic condition.  The patient presently taking Singulair and Advair.  Patient uses albuterol as needed.  Currently the patient asymptomatic.

## 2024-11-27 NOTE — ASSESSMENT & PLAN NOTE
Chronic recurrent condition noted in the mouth lower lip.  Patient denies fever or chills.  Currently the patient asymptomatic.

## 2024-11-27 NOTE — ASSESSMENT & PLAN NOTE
Chronic condition.  Patient presently taking Concerta.  Patient tolerating medication well.  Symptoms are well-controlled.  Patient request Rx refill.  No significant side effects reported.

## 2024-11-27 NOTE — ASSESSMENT & PLAN NOTE
This is a chronic condition.  The patient currently on diet therapy.  Previous lab test show slightly elevated LDL level.  Asymptomatic.

## 2024-11-27 NOTE — ASSESSMENT & PLAN NOTE
This is a new condition.  The patient reported rash noted in both groin region since the last couple of weeks.  Patient denies pain discomfort or itching.  Moisture and sweat appear to make it worse.

## 2024-12-01 ENCOUNTER — OFFICE VISIT (OUTPATIENT)
Dept: URGENT CARE | Facility: PHYSICIAN GROUP | Age: 29
End: 2024-12-01
Payer: COMMERCIAL

## 2024-12-01 VITALS
HEIGHT: 64 IN | TEMPERATURE: 99.1 F | WEIGHT: 162.2 LBS | OXYGEN SATURATION: 97 % | SYSTOLIC BLOOD PRESSURE: 108 MMHG | RESPIRATION RATE: 20 BRPM | BODY MASS INDEX: 27.69 KG/M2 | DIASTOLIC BLOOD PRESSURE: 68 MMHG | HEART RATE: 100 BPM

## 2024-12-01 DIAGNOSIS — J45.41 MODERATE PERSISTENT ASTHMA WITH EXACERBATION: ICD-10-CM

## 2024-12-01 DIAGNOSIS — J01.40 ACUTE NON-RECURRENT PANSINUSITIS: ICD-10-CM

## 2024-12-01 DIAGNOSIS — R06.2 WHEEZING: ICD-10-CM

## 2024-12-01 DIAGNOSIS — J45.41 MODERATE PERSISTENT ASTHMA WITH ACUTE EXACERBATION: Chronic | ICD-10-CM

## 2024-12-01 PROCEDURE — 99213 OFFICE O/P EST LOW 20 MIN: CPT | Mod: 25

## 2024-12-01 PROCEDURE — 3074F SYST BP LT 130 MM HG: CPT

## 2024-12-01 PROCEDURE — 3078F DIAST BP <80 MM HG: CPT

## 2024-12-01 PROCEDURE — 94640 AIRWAY INHALATION TREATMENT: CPT

## 2024-12-01 RX ORDER — ALBUTEROL SULFATE 90 UG/1
INHALANT RESPIRATORY (INHALATION)
Qty: 8.5 G | Refills: 0 | Status: SHIPPED | OUTPATIENT
Start: 2024-12-01

## 2024-12-01 RX ORDER — IPRATROPIUM BROMIDE AND ALBUTEROL SULFATE 2.5; .5 MG/3ML; MG/3ML
3 SOLUTION RESPIRATORY (INHALATION) ONCE
Status: COMPLETED | OUTPATIENT
Start: 2024-12-01 | End: 2024-12-01

## 2024-12-01 RX ORDER — PREDNISONE 20 MG/1
40 TABLET ORAL DAILY
Qty: 10 TABLET | Refills: 0 | Status: SHIPPED | OUTPATIENT
Start: 2024-12-01 | End: 2024-12-06

## 2024-12-01 RX ADMIN — IPRATROPIUM BROMIDE AND ALBUTEROL SULFATE 3 ML: 2.5; .5 SOLUTION RESPIRATORY (INHALATION) at 10:43

## 2024-12-01 ASSESSMENT — ENCOUNTER SYMPTOMS
NECK PAIN: 0
WEAKNESS: 0
FEVER: 1
SPUTUM PRODUCTION: 1
EYE REDNESS: 0
DIZZINESS: 0
BLURRED VISION: 0
SHORTNESS OF BREATH: 0
TINGLING: 0
EYE PAIN: 0
SINUS PAIN: 1
PHOTOPHOBIA: 0
DOUBLE VISION: 0
STRIDOR: 0
VOMITING: 0
SENSORY CHANGE: 0
SORE THROAT: 1
COUGH: 1
CHILLS: 1
HEADACHES: 1
NAUSEA: 0
DIARRHEA: 0
WHEEZING: 1
EYE DISCHARGE: 0
ABDOMINAL PAIN: 0
MYALGIAS: 0

## 2024-12-01 ASSESSMENT — VISUAL ACUITY: OU: 1

## 2024-12-01 NOTE — PROGRESS NOTES
Subjective     Gildardo Moody is a 29 y.o. male who presents with cough and sinus pressure x2 weeks.     HPI:  Gildardo is a 28yo male presenting for cough and sinus pressure x2 weeks. Reports associated sputum production with cough, fever, sore throat, intermittent wheezing, and nausea. Pertinent history of asthma. Denies abdominal pain, vomiting, or diarrhea. Tmax 99.1. Denies shortness of breath or increased work of breathing. No neck pain or swelling. Denies dysphagia or difficulty managing oral secretions. No rash. He has attempted Mucinex and Nyquil for relief.        Review of Systems   Constitutional:  Positive for chills, fever and malaise/fatigue.   HENT:  Positive for congestion, sinus pain and sore throat. Negative for ear discharge and ear pain.    Eyes:  Negative for blurred vision, double vision, photophobia, pain, discharge and redness.   Respiratory:  Positive for cough, sputum production and wheezing. Negative for shortness of breath and stridor.    Cardiovascular:  Negative for chest pain.   Gastrointestinal:  Negative for abdominal pain, diarrhea, nausea and vomiting.   Musculoskeletal:  Negative for myalgias and neck pain.   Skin:  Negative for rash.   Neurological:  Positive for headaches (intermittent, pressure in nature). Negative for dizziness, tingling, sensory change and weakness.     Past Medical History:   Diagnosis Date    ADHD (attention deficit hyperactivity disorder)     Allergy     ASTHMA     Cough 04/26/2022    Depression     Shortness of breath      Past Surgical History:   Procedure Laterality Date    SHOULDER ARTHROSCOPY W/ SLAP / LABRAL REPAIR  3/13/2014    Performed by Fabián Rosenberg M.D. at [facility]    OTHER  1/2014    left shoulder dislocation put back in place    OTHER  1994    detached retina OU     Patient has no known allergies.     Current Outpatient Medications:     methylphenidate (CONCERTA) 54 MG CR tablet, Take 1 Tablet by mouth every morning  "for 30 days., Disp: 30 Tablet, Rfl: 0    montelukast (SINGULAIR) 10 MG Tab, Take 1 Tablet by mouth every day., Disp: 90 Tablet, Rfl: 3    Cholecalciferol (VITAMIN D) 125 MCG (5000 UT) Cap, Take  by mouth., Disp: , Rfl:     Cetirizine HCl (ZYRTEC ALLERGY PO), Take  by mouth 1 time a day as needed., Disp: , Rfl:     Social History     Tobacco Use    Smoking status: Former     Current packs/day: 0.00     Types: Cigarettes     Quit date: 9/10/2017     Years since quittin.2     Passive exposure: Never    Smokeless tobacco: Never   Vaping Use    Vaping status: Never Used   Substance Use Topics    Alcohol use: Yes     Comment: occ/social    Drug use: Not Currently     Types: Cocaine     Family History   Family history unknown: Yes     Medications, Allergies, and current problem list reviewed today in Epic.      Objective     /68 (BP Location: Left arm, Patient Position: Sitting, BP Cuff Size: Adult long)   Pulse 100   Temp 37.3 °C (99.1 °F) (Temporal)   Resp 20   Ht 1.626 m (5' 4\")   Wt 73.6 kg (162 lb 3.2 oz)   SpO2 97%   BMI 27.84 kg/m²      Physical Exam  Vitals reviewed.   Constitutional:       General: He is not in acute distress.  HENT:      Head: No right periorbital erythema or left periorbital erythema.      Jaw: There is normal jaw occlusion. No tenderness, swelling, pain on movement or malocclusion.      Right Ear: Tympanic membrane, ear canal and external ear normal.      Left Ear: Tympanic membrane, ear canal and external ear normal.      Nose: Congestion present.      Right Turbinates: Enlarged and swollen.      Left Turbinates: Enlarged and swollen.      Right Sinus: Maxillary sinus tenderness present. No frontal sinus tenderness.      Left Sinus: Maxillary sinus tenderness present. No frontal sinus tenderness.      Mouth/Throat:      Lips: No lesions.      Mouth: Mucous membranes are moist. No oral lesions or angioedema.      Tongue: No lesions. Tongue does not deviate from midline.      " Palate: No mass and lesions.      Pharynx: Uvula midline. Posterior oropharyngeal erythema present. No oropharyngeal exudate or uvula swelling.   Eyes:      General: Vision grossly intact. Gaze aligned appropriately. No visual field deficit.     Extraocular Movements: Extraocular movements intact.      Conjunctiva/sclera: Conjunctivae normal.      Pupils: Pupils are equal, round, and reactive to light.      Right eye: Pupil is round, reactive and not sluggish.      Left eye: Pupil is round, reactive and not sluggish.      Funduscopic exam:     Right eye: Red reflex present.         Left eye: Red reflex present.  Neck:      Trachea: Trachea normal. No abnormal tracheal secretions or tracheal deviation.   Cardiovascular:      Rate and Rhythm: Normal rate and regular rhythm.      Pulses: Normal pulses.      Heart sounds: Normal heart sounds.   Pulmonary:      Effort: Pulmonary effort is normal. No tachypnea, accessory muscle usage, prolonged expiration, respiratory distress or retractions.      Breath sounds: No stridor, decreased air movement or transmitted upper airway sounds. Examination of the right-upper field reveals wheezing. Examination of the left-upper field reveals wheezing. Examination of the right-middle field reveals wheezing. Examination of the left-middle field reveals wheezing. Wheezing (improved post DuoNeb) present. No rhonchi or rales.   Musculoskeletal:         General: Normal range of motion.      Cervical back: Full passive range of motion without pain, normal range of motion and neck supple. No erythema, rigidity, tenderness or crepitus. No pain with movement. Normal range of motion.   Lymphadenopathy:      Cervical: No cervical adenopathy.   Skin:     General: Skin is warm and dry.      Findings: No rash.   Neurological:      Mental Status: He is alert. Mental status is at baseline.      Sensory: Sensation is intact.      Motor: Motor function is intact.      Coordination: Coordination is  intact.      Gait: Gait is intact.   Psychiatric:         Mood and Affect: Mood normal.         Behavior: Behavior normal.         Thought Content: Thought content normal.       Assessment & Plan     1. Wheezing   - ipratropium-albuterol (DUONEB) nebulizer solution    2. Acute non-recurrent pansinusitis   - amoxicillin-clavulanate (AUGMENTIN) 875-125 MG Tab; Take 1 Tablet by mouth 2 times a day for 7 days.  Dispense: 14 Tablet; Refill: 0    3. Moderate persistent asthma with acute exacerbation   - predniSONE (DELTASONE) 20 MG Tab; Take 2 Tablets by mouth every day for 5 days.  Dispense: 10 Tablet; Refill: 0    4. Moderate persistent asthma with exacerbation   - albuterol 108 (90 Base) MCG/ACT Aero Soln inhalation aerosol; 2 PUFFS EVERY 4 HOURS ONLY IF NEEDED FOR COUGH, WHEEZING, OR SHORTNESS OF BREATH.  Dispense: 8.5 g; Refill: 0       MDM/Comments:   Patient meets IDSA guidelines for ABRS given duration of symptoms, worsening severity, clinical history and physical exam  Consider antihistamine, nasal steroid, anti-inflammatory, and saline rinses  No evidence of venous sinus thrombosis or more serious etiology  Typically these infections display a slow resolution of symptoms starting at 48-72 hours of treatment.  Mild pressure and pain may continue at end of week of antibiotics which is normal and continue the other supportive care until back to baseline     Recommended supportive treatment at home:  OTC Tylenol for fever/discomfort.  OTC supportive care for nasal congestion - saline nasal spray/Flonase nasal spray and/or netipot  Humidifier and steam inhalation/warm showers.  Increase oral fluid intake.  Warm saline gargles for sore throat.     Illness progression and alarm symptoms discussed with patient, emphasizing low threshold for returning to clinic/emergency department for worsening symptoms. Patient is agreeable to the plan and verbalizes understanding, and will follow up if warranted.       Differential  diagnosis, natural history, supportive care, and indications for immediate follow-up discussed.      Follow-up as needed if symptoms worsen or fail to improve to PCP, Urgent care or Emergency Room.                           Electronically signed by LONG Mock

## 2024-12-10 ENCOUNTER — OFFICE VISIT (OUTPATIENT)
Dept: URGENT CARE | Facility: PHYSICIAN GROUP | Age: 29
End: 2024-12-10
Payer: COMMERCIAL

## 2024-12-10 VITALS
RESPIRATION RATE: 16 BRPM | OXYGEN SATURATION: 97 % | WEIGHT: 162 LBS | HEART RATE: 95 BPM | TEMPERATURE: 96.8 F | BODY MASS INDEX: 27.66 KG/M2 | SYSTOLIC BLOOD PRESSURE: 118 MMHG | HEIGHT: 64 IN | DIASTOLIC BLOOD PRESSURE: 82 MMHG

## 2024-12-10 DIAGNOSIS — R05.2 SUBACUTE COUGH: ICD-10-CM

## 2024-12-10 DIAGNOSIS — J45.41 MODERATE PERSISTENT ASTHMA WITH EXACERBATION: ICD-10-CM

## 2024-12-10 PROCEDURE — 94640 AIRWAY INHALATION TREATMENT: CPT | Performed by: PHYSICIAN ASSISTANT

## 2024-12-10 PROCEDURE — 99214 OFFICE O/P EST MOD 30 MIN: CPT | Mod: 25 | Performed by: PHYSICIAN ASSISTANT

## 2024-12-10 RX ORDER — IPRATROPIUM BROMIDE AND ALBUTEROL SULFATE 2.5; .5 MG/3ML; MG/3ML
3 SOLUTION RESPIRATORY (INHALATION) ONCE
Status: COMPLETED | OUTPATIENT
Start: 2024-12-10 | End: 2024-12-10

## 2024-12-10 RX ORDER — MONTELUKAST SODIUM 10 MG/1
10 TABLET ORAL DAILY
Qty: 90 TABLET | Refills: 3 | Status: SHIPPED | OUTPATIENT
Start: 2024-12-10

## 2024-12-10 RX ORDER — BENZONATATE 200 MG/1
200 CAPSULE ORAL 3 TIMES DAILY PRN
Qty: 60 CAPSULE | Refills: 0 | Status: SHIPPED | OUTPATIENT
Start: 2024-12-10

## 2024-12-10 RX ORDER — PREDNISONE 20 MG/1
TABLET ORAL
Qty: 20 TABLET | Refills: 0 | Status: SHIPPED | OUTPATIENT
Start: 2024-12-10

## 2024-12-10 RX ADMIN — IPRATROPIUM BROMIDE AND ALBUTEROL SULFATE 3 ML: 2.5; .5 SOLUTION RESPIRATORY (INHALATION) at 10:26

## 2024-12-10 ASSESSMENT — ENCOUNTER SYMPTOMS
CHILLS: 0
SPUTUM PRODUCTION: 0
DIARRHEA: 1
WHEEZING: 1
NAUSEA: 0
VOMITING: 0
COUGH: 1
SHORTNESS OF BREATH: 0
ABDOMINAL PAIN: 0
FEVER: 0
SINUS PAIN: 0

## 2024-12-10 NOTE — PROGRESS NOTES
Subjective:   Gildardo Moody  is a 29 y.o. male who presents for Cough (X 1 month with congestion and wheezing.  Hx of Asthma.  Pt. Was seen on 12-01-24 for symptoms. )      Cough  This is a new problem. The current episode started more than 1 month ago. Associated symptoms include wheezing. Pertinent negatives include no chest pain, chills, ear pain, fever, rash or shortness of breath. His past medical history is significant for environmental allergies.   Patient presents urgent care describing last approximate 1 month of cough and congestion and wheezing.  Patient has a history of persistent asthma.  Has been using his inhaler much more than usual.  Patient does not currently use a maintenance inhaler.  Ultimately a rescue inhaler.  He does not have a nebulizer at home.  He was seen to the urgent care last week for more sinus congestion and pressure that he is experiencing now.  He was prescribed a course of antibiotics as well as 5 days of prednisone.  He noted improved symptoms but now with persistent coughing.  He states cough is continued but is less spastic and wheezy than it was last week.  He notes some tightness in chest with coughing.  Denies as much sinus congestion or pressure.  He denies painful chest with coughing.  He denies ear pain or sore throat.  He does have a history of pneumonia most recently 3 years ago with COVID.  He states at that time he had chest congestion and pain that he does not feel currently.  He declines chest x-ray evaluation for pneumonia.  He reports some mild diarrhea having finished antibiotics.  Denies vomiting.    Review of Systems   Constitutional:  Negative for chills and fever.   HENT:  Positive for congestion. Negative for ear pain and sinus pain.    Respiratory:  Positive for cough and wheezing. Negative for sputum production and shortness of breath.    Cardiovascular:  Negative for chest pain.   Gastrointestinal:  Positive for diarrhea. Negative for  "abdominal pain, nausea and vomiting.   Skin:  Negative for rash.   Endo/Heme/Allergies:  Positive for environmental allergies.       No Known Allergies     Objective:   /82   Pulse 95   Temp 36 °C (96.8 °F) (Temporal)   Resp 16   Ht 1.626 m (5' 4\")   Wt 73.5 kg (162 lb)   SpO2 97%   BMI 27.81 kg/m²     Physical Exam  Vitals and nursing note reviewed.   Constitutional:       General: He is not in acute distress.     Appearance: Normal appearance. He is well-developed. He is not diaphoretic.   HENT:      Head: Normocephalic and atraumatic.      Right Ear: Tympanic membrane, ear canal and external ear normal.      Left Ear: Tympanic membrane, ear canal and external ear normal.      Nose: Nose normal.      Mouth/Throat:      Mouth: Mucous membranes are moist.      Pharynx: Uvula midline. Posterior oropharyngeal erythema ( mild PND) present. No oropharyngeal exudate.      Tonsils: No tonsillar abscesses.   Eyes:      General: No scleral icterus.        Right eye: No discharge.         Left eye: No discharge.      Conjunctiva/sclera: Conjunctivae normal.   Pulmonary:      Effort: Pulmonary effort is normal. No accessory muscle usage or respiratory distress.      Breath sounds: No stridor. Wheezing and rhonchi (trace) present. No decreased breath sounds or rales.   Musculoskeletal:         General: Normal range of motion.      Cervical back: Neck supple.   Skin:     General: Skin is warm and dry.      Coloration: Skin is not pale.   Neurological:      Mental Status: He is alert and oriented to person, place, and time.      Coordination: Coordination normal.       CXR - pt declines; does not feel as bad as when he had pneumonia    DuoNeb-patient tolerates well    Assessment/Plan:   1. Moderate persistent asthma with exacerbation  - ipratropium-albuterol (DUONEB) nebulizer solution  - benzonatate (TESSALON) 200 MG capsule; Take 1 Capsule by mouth 3 times a day as needed for Cough.  Dispense: 60 Capsule; Refill: " 0  - predniSONE (DELTASONE) 20 MG Tab; Take 1 tab three times a day for 3 days, then 1 tab twice a day for 3 days, then 1 tab once daily for 3 days. Take with food.  Dispense: 20 Tablet; Refill: 0  - montelukast (SINGULAIR) 10 MG Tab; Take 1 Tablet by mouth every day.  Dispense: 90 Tablet; Refill: 3    2. Subacute cough  - benzonatate (TESSALON) 200 MG capsule; Take 1 Capsule by mouth 3 times a day as needed for Cough.  Dispense: 60 Capsule; Refill: 0  - predniSONE (DELTASONE) 20 MG Tab; Take 1 tab three times a day for 3 days, then 1 tab twice a day for 3 days, then 1 tab once daily for 3 days. Take with food.  Dispense: 20 Tablet; Refill: 0  Supportive care is reviewed with patient/caregiver - recommend to push PO fluids and electrolytes, Cautioned regarding potential side effects of steroid, avoid nsaids while using.  Recommending taking antihistamine.  Humidifier.  Sent with cough suppressant montelukast and longer prednisone taper.  Given DuoNeb in clinic.    Return to clinic with lack of resolution or progression of symptoms.        I have worn an N95 mask, gloves and eye protection for the entire encounter with this patient.     Differential diagnosis, natural history, supportive care, and indications for immediate follow-up discussed.

## 2024-12-12 NOTE — TELEPHONE ENCOUNTER
Lab called with positive chlamydia results will report to [facility]   Rubina Devine MD    Performed by: Rubina Devine MD  Preanesthetic Checklist  Completed: patient identified, IV checked, site marked, risks and benefits discussed, surgical consent, monitors and equipment checked, pre-op evaluation and timeout performed   Timeout performed at: 12/12/2024 3:00 PM  Peripheral Block  Patient position: sitting  Prep: ChloraPrep and site prepped and draped  Patient monitoring: heart rate, cardiac monitor and continuous pulse ox  Block type: brachial plexus and supraclavicular  Laterality: left  Injection technique: single-shot  Guidance: nerve stimulator, ultrasound guided and ultrasound image saved to chart.  Local infiltration: lidocaine  Infiltration strength: 1 %  Dose: 2 mL  Needle  Needle type: short-bevel   Needle gauge: 22 G  Needle length: 5 cm  Needle localization: anatomical landmarks, nerve stimulator and ultrasound guidance  Test dose: negative  Assessment  Injection assessment: negative aspiration for heme, no paresthesia on injection, incremental injection and local visualized surrounding nerve on ultrasound  Paresthesia pain: none  Heart rate change: no  Slow fractionated injection: yes  Additional Notes  Prior to Procedure:  Focussed neurological history elicited. Patient related and procedure specific risks discussed including, but not limited to: bleeding, infection, nerve injury, injury to surrounding structures, prolonged numbness or weakness, local anesthetic toxicity and increased pain after block resolution.Verbal consent obtained from patient and/or surrogate decision maker. Anticoagulation (if any) was held per ORALIA guidelines.    No Evoked Motor Response was visible at < 0.3 mA. Using hydrodissection with a blunt-tip,  intraneural injection or expansion of intraneural tissue. There was no resistance to injection and appropriate injection pressures were maintained based on tactile feedback. Throughout the procedure, there was consistent and meaningful verbal communication with the patient. Post procedure vital signs were stable and no immediate complications were noted. PACU will provide written instructions that outline the specific precautions to be taken when caring for an akinetic, insensate extremity.

## 2024-12-18 DIAGNOSIS — K13.79 MOUTH SORE: ICD-10-CM

## 2025-01-03 DIAGNOSIS — F90.9 ATTENTION DEFICIT HYPERACTIVITY DISORDER (ADHD), UNSPECIFIED ADHD TYPE: Chronic | ICD-10-CM

## 2025-01-06 RX ORDER — METHYLPHENIDATE HYDROCHLORIDE 54 MG/1
54 TABLET ORAL EVERY MORNING
Qty: 30 TABLET | Refills: 0 | Status: SHIPPED | OUTPATIENT
Start: 2025-01-06 | End: 2025-02-05

## 2025-01-06 NOTE — TELEPHONE ENCOUNTER
Received request via: Pharmacy    Was the patient seen in the last year in this department? Yes    Does the patient have an active prescription (recently filled or refills available) for medication(s) requested? No      Does the patient have correction Plus and need 100-day supply? (This applies to ALL medications) Patient does not have SCP

## 2025-02-07 DIAGNOSIS — F90.9 ATTENTION DEFICIT HYPERACTIVITY DISORDER (ADHD), UNSPECIFIED ADHD TYPE: Chronic | ICD-10-CM

## 2025-02-07 NOTE — TELEPHONE ENCOUNTER
Received request via: Patient    Was the patient seen in the last year in this department? Yes    Does the patient have an active prescription (recently filled or refills available) for medication(s) requested? No    Pharmacy Name: [facility]/pharmacy #[id number] - [place], [place] - [address] [facility].     Does the patient have long term Plus and need 100-day supply? (This applies to ALL medications) Patient does not have SCP

## 2025-02-08 RX ORDER — METHYLPHENIDATE HYDROCHLORIDE 54 MG/1
54 TABLET ORAL EVERY MORNING
Qty: 30 TABLET | Refills: 0 | Status: SHIPPED | OUTPATIENT
Start: 2025-02-08 | End: 2025-03-10

## 2025-03-10 DIAGNOSIS — F90.9 ATTENTION DEFICIT HYPERACTIVITY DISORDER (ADHD), UNSPECIFIED ADHD TYPE: Chronic | ICD-10-CM

## 2025-03-11 RX ORDER — METHYLPHENIDATE HYDROCHLORIDE 54 MG/1
54 TABLET ORAL EVERY MORNING
Qty: 30 TABLET | Refills: 0 | Status: SHIPPED | OUTPATIENT
Start: 2025-03-11 | End: 2025-03-25 | Stop reason: SDUPTHER

## 2025-03-11 NOTE — TELEPHONE ENCOUNTER
Received request via: Patient    Was the patient seen in the last year in this department? Yes    Does the patient have an active prescription (recently filled or refills available) for medication(s) requested? No    Pharmacy Name:   [facility]/pharmacy #[id number] - [place] - [address] [place] [address]  [address] [place] [address]  [place] [address]  Phone: [phone] Fax: [phone]        Does the patient have shelter Plus and need 100-day supply? (This applies to ALL medications) Patient does not have SCP

## 2025-03-25 ENCOUNTER — OFFICE VISIT (OUTPATIENT)
Dept: MEDICAL GROUP | Facility: PHYSICIAN GROUP | Age: 30
End: 2025-03-25
Payer: COMMERCIAL

## 2025-03-25 VITALS
RESPIRATION RATE: 16 BRPM | HEIGHT: 64 IN | OXYGEN SATURATION: 96 % | HEART RATE: 90 BPM | SYSTOLIC BLOOD PRESSURE: 112 MMHG | BODY MASS INDEX: 28.31 KG/M2 | WEIGHT: 165.8 LBS | DIASTOLIC BLOOD PRESSURE: 78 MMHG | TEMPERATURE: 97.8 F

## 2025-03-25 DIAGNOSIS — R73.03 PREDIABETES: Chronic | ICD-10-CM

## 2025-03-25 DIAGNOSIS — Z11.4 SCREENING FOR HIV (HUMAN IMMUNODEFICIENCY VIRUS): ICD-10-CM

## 2025-03-25 DIAGNOSIS — J45.41 MODERATE PERSISTENT ASTHMA WITH ACUTE EXACERBATION: Chronic | ICD-10-CM

## 2025-03-25 DIAGNOSIS — E55.9 VITAMIN D DEFICIENCY: Chronic | ICD-10-CM

## 2025-03-25 DIAGNOSIS — J30.1 SEASONAL ALLERGIC RHINITIS DUE TO POLLEN: Chronic | ICD-10-CM

## 2025-03-25 DIAGNOSIS — F90.9 ATTENTION DEFICIT HYPERACTIVITY DISORDER (ADHD), UNSPECIFIED ADHD TYPE: Chronic | ICD-10-CM

## 2025-03-25 DIAGNOSIS — Z51.81 THERAPEUTIC DRUG MONITORING: ICD-10-CM

## 2025-03-25 DIAGNOSIS — E78.5 DYSLIPIDEMIA: Chronic | ICD-10-CM

## 2025-03-25 PROBLEM — B00.1 COLD SORE: Chronic | Status: ACTIVE | Noted: 2024-11-27

## 2025-03-25 PROCEDURE — 3078F DIAST BP <80 MM HG: CPT | Performed by: INTERNAL MEDICINE

## 2025-03-25 PROCEDURE — 3074F SYST BP LT 130 MM HG: CPT | Performed by: INTERNAL MEDICINE

## 2025-03-25 PROCEDURE — 99214 OFFICE O/P EST MOD 30 MIN: CPT | Performed by: INTERNAL MEDICINE

## 2025-03-25 RX ORDER — METHYLPHENIDATE HYDROCHLORIDE 54 MG/1
54 TABLET ORAL EVERY MORNING
Qty: 30 TABLET | Refills: 0 | Status: SHIPPED | OUTPATIENT
Start: 2025-04-19 | End: 2025-05-19

## 2025-03-25 ASSESSMENT — PATIENT HEALTH QUESTIONNAIRE - PHQ9: CLINICAL INTERPRETATION OF PHQ2 SCORE: 0

## 2025-03-25 NOTE — PROGRESS NOTES
PRIMARY CARE CLINIC VISIT        Chief Complaint   Patient presents with    Follow-Up      Attention deficit disorder  Rx refill  Allergies  Prediabetes  Hyperlipidemia  Asthma  Vitamin D deficiency  Request lab test        History of Present Illness     Attention deficit hyperactivity disorder (ADHD)  Chronic condition.  The patient being treated with concerta.  Patient reported that the medication has been working well for him.  Patient request Rx refill.  The last consent form was signed by the patient November 2024.  Urine drug screen requested today.    Allergic rhinitis  Chronic.  Patient presently taking Zyrtec.  He denies fever chills shortness of breath or wheezing.    Prediabetes  Chronic condition.  Currently on diet therapy.  Patient is due for lab test.    Dyslipidemia  Chronic.  Patient currently on diet therapy.  Lab test ordered for follow-up.  Patient asymptomatic.    Moderate persistent asthma with acute exacerbation  Chronic condition.  The patient is taking Singulair.  He uses albuterol as needed.  Currently the patient asymptomatic    Vitamin D deficiency  Chronic condition    the patient taking vitamin D supplementation.  Lab test ordered for follow-up    Current Outpatient Medications on File Prior to Visit   Medication Sig Dispense Refill    montelukast (SINGULAIR) 10 MG Tab Take 1 Tablet by mouth every day. 90 Tablet 3    albuterol 108 (90 Base) MCG/ACT Aero Soln inhalation aerosol 2 PUFFS EVERY 4 HOURS ONLY IF NEEDED FOR COUGH, WHEEZING, OR SHORTNESS OF BREATH. 8.5 g 0    ketoconazole (NIZORAL) 2 % Cream Apply to affected areas bid as needed 60 g 2    valacyclovir (VALTREX) 1 GM Tab Take 1 Tablet by mouth every day. 5 Tablet 4    Cholecalciferol (VITAMIN D) 125 MCG (5000 UT) Cap Take  by mouth.      Cetirizine HCl (ZYRTEC ALLERGY PO) Take  by mouth 1 time a day as needed.      predniSONE (DELTASONE) 20 MG Tab Take 1 tab three times a day for 3 days, then 1 tab twice a day for 3 days, then 1  tab once daily for 3 days. Take with food. (Patient not taking: Reported on 3/25/2025) 20 Tablet 0    fluticasone-salmeterol (ADVAIR) 250-50 MCG/ACT AEROSOL POWDER, BREATH ACTIVATED Inhale 1 Puff every 12 hours. Rinse mouth after use (Patient not taking: Reported on 12/1/2024) 1 Each 6     No current facility-administered medications on file prior to visit.        Allergies: Patient has no known allergies.    Current Outpatient Medications Ordered in Epic   Medication Sig Dispense Refill    [START ON 4/19/2025] methylphenidate (CONCERTA) 54 MG CR tablet Take 1 Tablet by mouth every morning for 30 days. 30 Tablet 0    montelukast (SINGULAIR) 10 MG Tab Take 1 Tablet by mouth every day. 90 Tablet 3    albuterol 108 (90 Base) MCG/ACT Aero Soln inhalation aerosol 2 PUFFS EVERY 4 HOURS ONLY IF NEEDED FOR COUGH, WHEEZING, OR SHORTNESS OF BREATH. 8.5 g 0    ketoconazole (NIZORAL) 2 % Cream Apply to affected areas bid as needed 60 g 2    valacyclovir (VALTREX) 1 GM Tab Take 1 Tablet by mouth every day. 5 Tablet 4    Cholecalciferol (VITAMIN D) 125 MCG (5000 UT) Cap Take  by mouth.      Cetirizine HCl (ZYRTEC ALLERGY PO) Take  by mouth 1 time a day as needed.      predniSONE (DELTASONE) 20 MG Tab Take 1 tab three times a day for 3 days, then 1 tab twice a day for 3 days, then 1 tab once daily for 3 days. Take with food. (Patient not taking: Reported on 3/25/2025) 20 Tablet 0    fluticasone-salmeterol (ADVAIR) 250-50 MCG/ACT AEROSOL POWDER, BREATH ACTIVATED Inhale 1 Puff every 12 hours. Rinse mouth after use (Patient not taking: Reported on 12/1/2024) 1 Each 6     No current Epic-ordered facility-administered medications on file.       Past Medical History:   Diagnosis Date    ADHD (attention deficit hyperactivity disorder)     Allergy     ASTHMA     Cough 04/26/2022    Depression     Shortness of breath        Past Surgical History:   Procedure Laterality Date    SHOULDER ARTHROSCOPY W/ SLAP / LABRAL REPAIR  3/13/2014     "Performed by Fabián Rosenberg M.D. at SURGERY [facility] ORS    OTHER  2014    left shoulder dislocation put back in place    OTHER      detached retina OU       Family History   Family history unknown: Yes       Social History     Tobacco Use   Smoking Status Former    Current packs/day: 0.00    Types: Cigarettes    Quit date: 9/10/2017    Years since quittin.5    Passive exposure: Never   Smokeless Tobacco Never       Social History     Substance and Sexual Activity   Alcohol Use Yes    Comment: occ/social       Review of systems  As per HPI above. All other systems reviewed and negative.      Past Medical, Social, and Family history reviewed and updated in [facility]       LAB DATA:     I have independently reviewed / interpreted labs    Lab Results   Component Value Date/Time    HBA1C 5.9 (H) 2024 01:30 PM    HBA1C 5.8 (H) 10/02/2023 03:59 PM    HBA1C 5.6 2022 11:55 AM        Lab Results   Component Value Date/Time    WBC 5.2 10/02/2023 03:59 PM    HEMOGLOBIN 17.8 10/02/2023 03:59 PM    HEMATOCRIT 50.8 10/02/2023 03:59 PM    MCV 85.7 10/02/2023 03:59 PM    PLATELETCT 253 10/02/2023 03:59 PM       Lab Results   Component Value Date/Time    SODIUM 138 2024 01:30 PM    POTASSIUM 4.0 2024 01:30 PM    GLUCOSE 107 (H) 2024 01:30 PM    BUN 13 2024 01:30 PM    CREATININE 0.71 2024 01:30 PM       Lab Results   Component Value Date/Time    CHOLSTRLTOT 175 2024 01:30 PM    TRIGLYCERIDE 82 2024 01:30 PM    HDL 44 2024 01:30 PM     (H) 2024 01:30 PM       Lab Results   Component Value Date/Time    ALTSGPT 24 10/02/2023 03:59 PM          Objective     /78 (BP Location: Left arm, Patient Position: Sitting, BP Cuff Size: Adult)   Pulse 90   Temp 36.6 °C (97.8 °F) (Temporal)   Resp 16   Ht 1.626 m (5' 4\")   Wt 75.2 kg (165 lb 12.8 oz)   SpO2 96%    Body mass index is 28.46 kg/m².    General: alert in no apparent distress.  Cardiovascular: " regular rate and rhythm  Pulmonary: lungs : no wheezing   Gastrointestinal: BS present.   Oropharynx no exudates  Sinus exam no purulent drainage    Assessment and Plan     1. Attention deficit hyperactivity disorder (ADHD), unspecified ADHD type  Chronic stable condition.  Continue Concerta 54 Mg daily.  Rx refill sent to her pharmacy.  UDS ordered today.  In prescribing controlled substances to this patient, I certify that I have obtained and reviewed the medical history of the patient. I have also made a good coty effort to obtain applicable records from other providers who have treated the patient.     I have reviewed patient's prescription history as maintained by the [place] Prescription Monitoring Program.      I have reviewed the medical records and have determined that a controlled substance treatment is medically indicated. I believe the benefits of controlled substance therapy outweigh the risks.      I have discussed the risks and benefits of treatment plan, and alternative therapies with the patient.  Also discussed with the patient regarding potential drug interactions with other medications.     Patient signed consent form    UDS requested   - methylphenidate (CONCERTA) 54 MG CR tablet; Take 1 Tablet by mouth every morning for 30 days.  Dispense: 30 Tablet; Refill: 0    2. Seasonal allergic rhinitis due to pollen  Chronic stable.  Continue Zyrtec 10 mg daily as needed    3. Prediabetes  - HEMOGLOBIN A1C; Future  - Basic Metabolic Panel; Future  Chronic.  Current status unclear.  Lab test ordered for follow-up.  Continue with diet and lifestyle modification    4. Dyslipidemia  - Lipid Profile; Future  Chronic condition.  Current status unclear.  Lab test ordered to check lipid panel    5. Moderate persistent asthma with acute exacerbation  Chronic stable.  Continue singular 10 mg daily.  Continue albuterol as needed    6. Vitamin D deficiency  - VITAMIN D,25 HYDROXY (DEFICIENCY); Future  This is a  chronic condition.  Current status unclear.  Lab test ordered to check vitamin D level    7. Screening for HIV (human immunodeficiency virus)  - HIV AG/AB COMBO ASSAY SCREENING; Future    8. Therapeutic drug monitoring  - PAIN MANAGEMENT PANEL, SCRN W/ RFLX TO QNT; Future                        Please note that this dictation was created using voice recognition software. I have made every reasonable attempt to correct obvious errors, but I expect that there are errors of grammar and possibly content that I did not discover before finalizing the note.    Cory Whiteside MD  Internal Medicine  [facility]

## 2025-03-25 NOTE — ASSESSMENT & PLAN NOTE
Chronic.  Patient presently taking Zyrtec.  He denies fever chills shortness of breath or wheezing.

## 2025-03-25 NOTE — ASSESSMENT & PLAN NOTE
Chronic condition.  The patient is taking Singulair.  He uses albuterol as needed.  Currently the patient asymptomatic

## 2025-03-25 NOTE — ASSESSMENT & PLAN NOTE
Chronic condition.  The patient being treated with concerta.  Patient reported that the medication has been working well for him.  Patient request Rx refill.  The last consent form was signed by the patient November 2024.  Urine drug screen requested today.

## 2025-03-25 NOTE — ASSESSMENT & PLAN NOTE
Addended by: YOLETTE REDDY on: 4/12/2024 01:12 PM     Modules accepted: Orders     Chronic condition    the patient taking vitamin D supplementation.  Lab test ordered for follow-up

## 2025-03-25 NOTE — ASSESSMENT & PLAN NOTE
Chronic.  Patient currently on diet therapy.  Lab test ordered for follow-up.  Patient asymptomatic.

## 2025-04-07 ENCOUNTER — HOSPITAL ENCOUNTER (OUTPATIENT)
Dept: LAB | Facility: MEDICAL CENTER | Age: 30
End: 2025-04-07
Attending: INTERNAL MEDICINE
Payer: COMMERCIAL

## 2025-04-07 DIAGNOSIS — Z00.00 WELL ADULT EXAM: ICD-10-CM

## 2025-04-07 DIAGNOSIS — R73.03 PREDIABETES: Chronic | ICD-10-CM

## 2025-04-07 DIAGNOSIS — E78.5 DYSLIPIDEMIA: Chronic | ICD-10-CM

## 2025-04-07 DIAGNOSIS — M25.50 ARTHRALGIA, UNSPECIFIED JOINT: ICD-10-CM

## 2025-04-07 PROCEDURE — 83036 HEMOGLOBIN GLYCOSYLATED A1C: CPT

## 2025-04-07 PROCEDURE — 82306 VITAMIN D 25 HYDROXY: CPT

## 2025-04-07 PROCEDURE — 36415 COLL VENOUS BLD VENIPUNCTURE: CPT

## 2025-04-07 PROCEDURE — 85652 RBC SED RATE AUTOMATED: CPT

## 2025-04-07 PROCEDURE — 82043 UR ALBUMIN QUANTITATIVE: CPT

## 2025-04-07 PROCEDURE — 80048 BASIC METABOLIC PNL TOTAL CA: CPT

## 2025-04-07 PROCEDURE — 84460 ALANINE AMINO (ALT) (SGPT): CPT

## 2025-04-07 PROCEDURE — 86038 ANTINUCLEAR ANTIBODIES: CPT

## 2025-04-07 PROCEDURE — 84550 ASSAY OF BLOOD/URIC ACID: CPT

## 2025-04-07 PROCEDURE — 86431 RHEUMATOID FACTOR QUANT: CPT

## 2025-04-07 PROCEDURE — 80061 LIPID PANEL: CPT

## 2025-04-07 PROCEDURE — 82570 ASSAY OF URINE CREATININE: CPT

## 2025-04-08 LAB
25(OH)D3 SERPL-MCNC: 46 NG/ML (ref 30–100)
ALT SERPL-CCNC: 17 U/L (ref 2–50)
ANION GAP SERPL CALC-SCNC: 11 MMOL/L (ref 7–16)
BUN SERPL-MCNC: 18 MG/DL (ref 8–22)
CALCIUM SERPL-MCNC: 9.1 MG/DL (ref 8.5–10.5)
CHLORIDE SERPL-SCNC: 104 MMOL/L (ref 96–112)
CHOLEST SERPL-MCNC: 192 MG/DL (ref 100–199)
CO2 SERPL-SCNC: 23 MMOL/L (ref 20–33)
CREAT SERPL-MCNC: 0.89 MG/DL (ref 0.5–1.4)
CREAT UR-MCNC: 220 MG/DL
ERYTHROCYTE [SEDIMENTATION RATE] IN BLOOD BY WESTERGREN METHOD: 4 MM/HOUR (ref 0–20)
EST. AVERAGE GLUCOSE BLD GHB EST-MCNC: 123 MG/DL
GFR SERPLBLD CREATININE-BSD FMLA CKD-EPI: 118 ML/MIN/1.73 M 2
GLUCOSE SERPL-MCNC: 98 MG/DL (ref 65–99)
HBA1C MFR BLD: 5.9 % (ref 4–5.6)
HDLC SERPL-MCNC: 44 MG/DL
LDLC SERPL CALC-MCNC: 124 MG/DL
MICROALBUMIN UR-MCNC: <1.2 MG/DL
MICROALBUMIN/CREAT UR: NORMAL MG/G (ref 0–30)
POTASSIUM SERPL-SCNC: 3.7 MMOL/L (ref 3.6–5.5)
RHEUMATOID FACT SER IA-ACNC: <10 IU/ML (ref 0–14)
SODIUM SERPL-SCNC: 138 MMOL/L (ref 135–145)
TRIGL SERPL-MCNC: 121 MG/DL (ref 0–149)
URATE SERPL-MCNC: 7.3 MG/DL (ref 2.5–8.3)

## 2025-04-09 ENCOUNTER — RESULTS FOLLOW-UP (OUTPATIENT)
Dept: MEDICAL GROUP | Facility: PHYSICIAN GROUP | Age: 30
End: 2025-04-09
Payer: COMMERCIAL

## 2025-04-09 DIAGNOSIS — E78.5 DYSLIPIDEMIA: Chronic | ICD-10-CM

## 2025-04-09 LAB — NUCLEAR IGG SER QL IA: NORMAL

## 2025-05-06 ENCOUNTER — OFFICE VISIT (OUTPATIENT)
Dept: URGENT CARE | Facility: PHYSICIAN GROUP | Age: 30
End: 2025-05-06
Payer: COMMERCIAL

## 2025-05-06 VITALS
RESPIRATION RATE: 12 BRPM | WEIGHT: 166.34 LBS | HEART RATE: 84 BPM | HEIGHT: 65 IN | BODY MASS INDEX: 27.71 KG/M2 | OXYGEN SATURATION: 97 % | SYSTOLIC BLOOD PRESSURE: 118 MMHG | DIASTOLIC BLOOD PRESSURE: 72 MMHG | TEMPERATURE: 98.8 F

## 2025-05-06 DIAGNOSIS — J45.41 MODERATE PERSISTENT ASTHMA WITH ACUTE EXACERBATION: ICD-10-CM

## 2025-05-06 DIAGNOSIS — R05.1 ACUTE COUGH: ICD-10-CM

## 2025-05-06 DIAGNOSIS — J01.40 ACUTE NON-RECURRENT PANSINUSITIS: ICD-10-CM

## 2025-05-06 PROCEDURE — 3074F SYST BP LT 130 MM HG: CPT | Performed by: NURSE PRACTITIONER

## 2025-05-06 PROCEDURE — 99214 OFFICE O/P EST MOD 30 MIN: CPT | Performed by: NURSE PRACTITIONER

## 2025-05-06 PROCEDURE — 3078F DIAST BP <80 MM HG: CPT | Performed by: NURSE PRACTITIONER

## 2025-05-06 RX ORDER — LEVALBUTEROL TARTRATE 45 UG/1
1-2 AEROSOL, METERED ORAL EVERY 4 HOURS PRN
Qty: 15 G | Refills: 0 | Status: SHIPPED | OUTPATIENT
Start: 2025-05-06

## 2025-05-06 RX ORDER — DEXTROMETHORPHAN HYDROBROMIDE AND PROMETHAZINE HYDROCHLORIDE 15; 6.25 MG/5ML; MG/5ML
5 SYRUP ORAL EVERY 6 HOURS PRN
Qty: 118 ML | Refills: 0 | Status: SHIPPED | OUTPATIENT
Start: 2025-05-06 | End: 2025-05-13

## 2025-05-06 RX ORDER — PREDNISONE 20 MG/1
40 TABLET ORAL DAILY
Qty: 10 TABLET | Refills: 0 | Status: SHIPPED | OUTPATIENT
Start: 2025-05-06 | End: 2025-05-11

## 2025-05-06 NOTE — PROGRESS NOTES
Chief Complaint   Patient presents with    Seasonal Allergies     Hx of allergy induced asthma. Started having allergy sx w/ nasal drainage, post nasal drip, sinus headaches, diarrhea, nausea. and chest congestion x6 days. Concerned that all the drainage has gone into his lungs and is now pneumonia. Has been taking mucinex.           History of Present Illness  The patient is a 29-year-old male who presents for evaluation of sinus infection and asthma.    He has been experiencing symptoms consistent with his seasonal allergies since last Wednesday, which have escalated to a severe level. He reports significant sinus drainage, leading to tension headaches, stomachaches, diarrhea, and nausea. He also experiences pressure in his forehead and eyes. He has not monitored his temperature but suspects he may have had a fever on a few occasions. He does not report any ear discomfort. He recalls an incident yesterday where exposure to wind resulted in dry sinuses, which he found beneficial. He has a history of sinus infections, particularly during severe allergy episodes. He has been managing his symptoms with Mucinex, which initially provided relief but has since become ineffective.    He also suffers from allergy-induced asthma, which has resulted in extensive lung drainage. He reports daily expectoration of mucus, without any presence of blood. He describes a sensation of lung congestion and frequent coughing.  He states that it does not feel similar to his previous pneumonia.  His cough is severe enough to disrupt his sleep, necessitating him to sleep in a slightly elevated position. He does not report any lung pain or tearing sensation. He does not possess an albuterol inhaler at home. He has a past medical history of pneumonia, diagnosed 4 years ago, and a hospitalization due to respiratory distress 3 years ago.         ROS:    No severe shortness of breath   No Cardiac like chest pain, as discussed   As otherwise  stated in HPI    Medical/SX/ Social History:  Reviewed per chart    Pertinent Medications:    Current Outpatient Medications on File Prior to Visit   Medication Sig Dispense Refill    methylphenidate (CONCERTA) 54 MG CR tablet Take 1 Tablet by mouth every morning for 30 days. 30 Tablet 0    montelukast (SINGULAIR) 10 MG Tab Take 1 Tablet by mouth every day. 90 Tablet 3    albuterol 108 (90 Base) MCG/ACT Aero Soln inhalation aerosol 2 PUFFS EVERY 4 HOURS ONLY IF NEEDED FOR COUGH, WHEEZING, OR SHORTNESS OF BREATH. 8.5 g 0    ketoconazole (NIZORAL) 2 % Cream Apply to affected areas bid as needed 60 g 2    valacyclovir (VALTREX) 1 GM Tab Take 1 Tablet by mouth every day. 5 Tablet 4    Cholecalciferol (VITAMIN D) 125 MCG (5000 UT) Cap Take  by mouth.      Cetirizine HCl (ZYRTEC ALLERGY PO) Take  by mouth 1 time a day as needed.      fluticasone-salmeterol (ADVAIR) 250-50 MCG/ACT AEROSOL POWDER, BREATH ACTIVATED Inhale 1 Puff every 12 hours. Rinse mouth after use (Patient not taking: Reported on 5/6/2025) 1 Each 6     No current facility-administered medications on file prior to visit.        Allergies:    Seasonal     Problem list, medications, and allergies reviewed by myself today in Epic     Physical Exam:    Vitals:    05/06/25 0920   BP: 118/72   Pulse: 84   Resp: 12   Temp: 37.1 °C (98.8 °F)   SpO2: 97%             Physical Exam  Constitutional:       General: He is awake.      Appearance: Normal appearance. He is not ill-appearing, toxic-appearing or diaphoretic.   HENT:      Head: Normocephalic and atraumatic.      Right Ear: Tympanic membrane, ear canal and external ear normal.      Left Ear: Tympanic membrane, ear canal and external ear normal.      Nose: Mucosal edema, congestion and rhinorrhea present. Rhinorrhea is clear.      Right Turbinates: Swollen.      Left Turbinates: Swollen.      Right Sinus: Maxillary sinus tenderness and frontal sinus tenderness present.      Left Sinus: Maxillary sinus tenderness  and frontal sinus tenderness present.      Mouth/Throat:      Lips: Pink.      Mouth: Mucous membranes are moist.      Tongue: No lesions.      Palate: No lesions.      Pharynx: Posterior oropharyngeal erythema present. No oropharyngeal exudate or uvula swelling.      Tonsils: No tonsillar exudate or tonsillar abscesses.   Eyes:      General: Lids are normal. Gaze aligned appropriately. No allergic shiner or scleral icterus.     Extraocular Movements: Extraocular movements intact.      Conjunctiva/sclera: Conjunctivae normal.      Pupils: Pupils are equal, round, and reactive to light.   Cardiovascular:      Rate and Rhythm: Normal rate and regular rhythm.      Pulses:           Radial pulses are 2+ on the right side and 2+ on the left side.      Heart sounds: Normal heart sounds.   Pulmonary:      Effort: Pulmonary effort is normal.      Breath sounds: Normal air entry. Examination of the right-upper field reveals wheezing. Examination of the left-upper field reveals wheezing. Wheezing present. No decreased breath sounds, rhonchi or rales.      Comments: Mild expiratory wheezing  Abdominal:      General: Abdomen is flat. Bowel sounds are normal.      Palpations: Abdomen is soft.      Tenderness: There is no abdominal tenderness.   Musculoskeletal:      Right lower leg: No edema.      Left lower leg: No edema.   Lymphadenopathy:      Cervical: No cervical adenopathy.   Skin:     General: Skin is warm.      Capillary Refill: Capillary refill takes less than 2 seconds.      Coloration: Skin is not cyanotic or pale.   Neurological:      Mental Status: He is alert and oriented to person, place, and time.      Gait: Gait is intact.   Psychiatric:         Behavior: Behavior normal. Behavior is cooperative.                Medical Decision making and plan :  I personally reviewed prior external notes and test results pertinent to today's visit. Pt is clinically stable at today's acute urgent care visit.  Patient appears  nontoxic with no acute distress noted. Appropriate for outpatient care at this time.    Pleasant 29 y.o. male presented clinic with:     Assessment & Plan  1. Sinus Infection.  - He has been experiencing symptoms for 6 days, including sinus pain and pressure in the frontal sinuses, along with postnasal drip causing stomachaches, diarrhea, and nausea.  - Examination revealed significant inflammation in the nasal passages.  - A prescription for Augmentin for 7 days has been issued to treat the bacterial sinus infection. Prednisone will be taken every morning for 5 days with food.  - Promethazine DM has been prescribed as a nighttime cough medication to help dry up postnasal drip and quiet the cough. He is advised not to take it with other sedating medications. Flonase is recommended for use, and if it causes excessive dryness, he should switch to using it every other day. A work note has been provided through [employer].    2. Asthma.  Chronic acute exacerbation  - He has a history of allergy-induced asthma and has been experiencing significant mucus buildup and coughing.  - There is a little bit of expiratory wheezing in the upper airways. Oxygen saturation is at 97 percent.  - A prescription for levalbuterol inhaler has been issued as his current inhalers are .  -Did discuss obtaining chest x-ray in clinic today using shared decision making we will defer at this time as patient states he does not feel like pneumonia.  - If his condition worsens or if symptoms start to resemble pneumonia, he is advised to return for a chest x-ray.      Shared decision-making was utilized with patient for treatment plan. Medication discussed included indication for use and the potential benefits and side effects. Education was provided regarding the aforementioned assessments.  Differential Diagnosis, natural history, and supportive care discussed. All of the patient's questions were answered to their satisfaction at the time of  discharge. Patient was encouraged to monitor symptoms closely. Those signs and symptoms which would warrant concern and mandate seeking a higher level of service through the emergency department discussed at length.  Patient stated agreement and understanding of this plan of care.    Disposition:  Home in stable condition     Voice Recognition Disclaimer:  Portions of this document were created using voice recognition software and [employer] technology provided by [employer].  Patient was informed of [email or web address] technology being used.  The software does have a chance of producing errors of grammar and possibly content. I have made every reasonable attempt to correct obvious errors, but there could be errors of grammar and possibly content that I did not discover before finalizing the documentation.    Kasandra Mayo, MEGAN.LEIGH ANN.

## 2025-05-06 NOTE — LETTER
[facility] [address] [address]     May 6, 2025    Patient: Gildardo Moody   YOB: 1995   Date of Visit: 5/6/2025       To Whom It May Concern:    Gildardo Moody was seen and treated in our department on 5/6/2025.     Sincerely,     GLEN Pat.

## 2025-05-26 DIAGNOSIS — F90.9 ATTENTION DEFICIT HYPERACTIVITY DISORDER (ADHD), UNSPECIFIED ADHD TYPE: Chronic | ICD-10-CM

## 2025-05-28 RX ORDER — METHYLPHENIDATE HYDROCHLORIDE 54 MG/1
54 TABLET ORAL EVERY MORNING
Qty: 30 TABLET | Refills: 0 | Status: SHIPPED | OUTPATIENT
Start: 2025-05-28 | End: 2025-06-27

## 2025-05-28 NOTE — TELEPHONE ENCOUNTER
Received request via: Patient    Was the patient seen in the last year in this department? Yes    Does the patient have an active prescription (recently filled or refills available) for medication(s) requested? No    Pharmacy Name:   [facility]/pharmacy #[id number] - [place] - [address]  [address]  [place] [address]  Phone: [phone] Fax: [phone]        Does the patient have halfway Plus and need 100-day supply? (This applies to ALL medications) Patient does not have SCP

## 2025-07-01 DIAGNOSIS — F90.9 ATTENTION DEFICIT HYPERACTIVITY DISORDER (ADHD), UNSPECIFIED ADHD TYPE: Chronic | ICD-10-CM

## 2025-07-01 RX ORDER — METHYLPHENIDATE HYDROCHLORIDE 54 MG/1
54 TABLET ORAL EVERY MORNING
Qty: 30 TABLET | Refills: 0 | Status: SHIPPED | OUTPATIENT
Start: 2025-07-01 | End: 2025-07-31

## 2025-07-01 NOTE — TELEPHONE ENCOUNTER
Received request via: Patient    Was the patient seen in the last year in this department? Yes    Does the patient have an active prescription (recently filled or refills available) for medication(s) requested? No    Pharmacy Name: [facility]/pharmacy #[id number] - [place], [place] - [address] [place] [address]   [place] [address]     Does the patient have half-way Plus and need 100-day supply? (This applies to ALL medications) Patient does not have SCP

## 2025-08-05 ENCOUNTER — OFFICE VISIT (OUTPATIENT)
Dept: MEDICAL GROUP | Facility: PHYSICIAN GROUP | Age: 30
End: 2025-08-05
Payer: COMMERCIAL

## 2025-08-05 VITALS
WEIGHT: 169.4 LBS | TEMPERATURE: 97.6 F | SYSTOLIC BLOOD PRESSURE: 116 MMHG | HEIGHT: 64 IN | DIASTOLIC BLOOD PRESSURE: 56 MMHG | RESPIRATION RATE: 16 BRPM | HEART RATE: 84 BPM | OXYGEN SATURATION: 97 % | BODY MASS INDEX: 28.92 KG/M2

## 2025-08-05 DIAGNOSIS — Z51.81 THERAPEUTIC DRUG MONITORING: ICD-10-CM

## 2025-08-05 DIAGNOSIS — J30.1 SEASONAL ALLERGIC RHINITIS DUE TO POLLEN: Chronic | ICD-10-CM

## 2025-08-05 DIAGNOSIS — R73.03 PREDIABETES: Primary | Chronic | ICD-10-CM

## 2025-08-05 DIAGNOSIS — J45.41 MODERATE PERSISTENT ASTHMA WITH ACUTE EXACERBATION: Chronic | ICD-10-CM

## 2025-08-05 DIAGNOSIS — F90.9 ATTENTION DEFICIT HYPERACTIVITY DISORDER (ADHD), UNSPECIFIED ADHD TYPE: Chronic | ICD-10-CM

## 2025-08-05 DIAGNOSIS — E55.9 VITAMIN D DEFICIENCY: Chronic | ICD-10-CM

## 2025-08-05 DIAGNOSIS — E78.5 DYSLIPIDEMIA: Chronic | ICD-10-CM

## 2025-08-05 PROCEDURE — 3074F SYST BP LT 130 MM HG: CPT | Performed by: INTERNAL MEDICINE

## 2025-08-05 PROCEDURE — 99214 OFFICE O/P EST MOD 30 MIN: CPT | Performed by: INTERNAL MEDICINE

## 2025-08-05 PROCEDURE — 3078F DIAST BP <80 MM HG: CPT | Performed by: INTERNAL MEDICINE

## 2025-08-05 RX ORDER — METHYLPHENIDATE HYDROCHLORIDE 54 MG/1
54 TABLET ORAL EVERY MORNING
Qty: 30 TABLET | Refills: 0 | Status: SHIPPED | OUTPATIENT
Start: 2025-08-05 | End: 2025-09-04